# Patient Record
Sex: FEMALE | Race: WHITE | NOT HISPANIC OR LATINO | Employment: OTHER | URBAN - METROPOLITAN AREA
[De-identification: names, ages, dates, MRNs, and addresses within clinical notes are randomized per-mention and may not be internally consistent; named-entity substitution may affect disease eponyms.]

---

## 2018-02-28 ENCOUNTER — APPOINTMENT (OUTPATIENT)
Dept: WOMENS IMAGING | Facility: HOSPITAL | Age: 73
End: 2018-02-28

## 2018-02-28 PROCEDURE — 77067 SCR MAMMO BI INCL CAD: CPT | Performed by: RADIOLOGY

## 2018-03-29 ENCOUNTER — LAB REQUISITION (OUTPATIENT)
Dept: LAB | Facility: HOSPITAL | Age: 73
End: 2018-03-29

## 2018-03-29 DIAGNOSIS — M72.0 PALMAR FASCIAL FIBROMATOSIS: ICD-10-CM

## 2018-03-29 PROCEDURE — 88304 TISSUE EXAM BY PATHOLOGIST: CPT | Performed by: PLASTIC SURGERY

## 2018-03-30 LAB
CYTO UR: NORMAL
LAB AP CASE REPORT: NORMAL
LAB AP CLINICAL INFORMATION: NORMAL
Lab: NORMAL
PATH REPORT.FINAL DX SPEC: NORMAL
PATH REPORT.GROSS SPEC: NORMAL

## 2021-01-08 ENCOUNTER — OFFICE VISIT (OUTPATIENT)
Dept: NEUROSURGERY | Facility: CLINIC | Age: 76
End: 2021-01-08

## 2021-01-08 VITALS
WEIGHT: 114 LBS | HEIGHT: 60 IN | SYSTOLIC BLOOD PRESSURE: 128 MMHG | BODY MASS INDEX: 22.38 KG/M2 | DIASTOLIC BLOOD PRESSURE: 70 MMHG | TEMPERATURE: 97.3 F

## 2021-01-08 DIAGNOSIS — M48.54XA PATHOLOGICAL COMPRESSION FRACTURE OF THORACIC VERTEBRA, INITIAL ENCOUNTER (HCC): ICD-10-CM

## 2021-01-08 DIAGNOSIS — M51.36 DDD (DEGENERATIVE DISC DISEASE), LUMBAR: ICD-10-CM

## 2021-01-08 DIAGNOSIS — M54.9 MECHANICAL BACK PAIN: Primary | ICD-10-CM

## 2021-01-08 PROCEDURE — 99204 OFFICE O/P NEW MOD 45 MIN: CPT | Performed by: NEUROLOGICAL SURGERY

## 2021-01-08 RX ORDER — DILTIAZEM HYDROCHLORIDE 240 MG/1
240 CAPSULE, EXTENDED RELEASE ORAL DAILY
COMMUNITY
Start: 2021-01-06 | End: 2022-07-01 | Stop reason: SDUPTHER

## 2021-01-08 RX ORDER — BUSPIRONE HYDROCHLORIDE 10 MG/1
10 TABLET ORAL 2 TIMES DAILY
COMMUNITY
Start: 2020-11-17 | End: 2022-07-01 | Stop reason: SDUPTHER

## 2021-01-08 RX ORDER — GABAPENTIN 400 MG/1
400 CAPSULE ORAL 3 TIMES DAILY
COMMUNITY
Start: 2021-01-05 | End: 2021-10-12 | Stop reason: HOSPADM

## 2021-01-08 RX ORDER — PAROXETINE HYDROCHLORIDE 40 MG/1
40 TABLET, FILM COATED ORAL EVERY MORNING
COMMUNITY
Start: 2020-10-12 | End: 2022-04-15 | Stop reason: SDUPTHER

## 2021-01-08 RX ORDER — HYDROCODONE BITARTRATE AND ACETAMINOPHEN 10; 325 MG/1; MG/1
TABLET ORAL
COMMUNITY
Start: 2021-01-05

## 2021-01-08 RX ORDER — LOSARTAN POTASSIUM 50 MG/1
50 TABLET ORAL DAILY
COMMUNITY
Start: 2021-01-06 | End: 2022-07-01 | Stop reason: SDUPTHER

## 2021-01-08 RX ORDER — FUROSEMIDE 20 MG/1
TABLET ORAL
Status: ON HOLD | COMMUNITY
Start: 2020-11-16 | End: 2021-10-07

## 2021-01-08 RX ORDER — OMEPRAZOLE 40 MG/1
40 CAPSULE, DELAYED RELEASE ORAL DAILY
COMMUNITY
Start: 2020-11-17 | End: 2022-08-16 | Stop reason: SDUPTHER

## 2021-01-08 RX ORDER — MECLIZINE HYDROCHLORIDE 25 MG/1
25 TABLET ORAL 3 TIMES DAILY PRN
COMMUNITY
Start: 2020-11-17 | End: 2022-07-01 | Stop reason: SDUPTHER

## 2021-01-08 RX ORDER — PRAVASTATIN SODIUM 80 MG/1
80 TABLET ORAL NIGHTLY
COMMUNITY
Start: 2021-01-06 | End: 2022-07-01 | Stop reason: SDUPTHER

## 2021-01-08 NOTE — PROGRESS NOTES
NAME: SOY ACEVES   DOS: 2021  : 1945  PCP: Omer Diaz MD    Chief Complaint:    Chief Complaint   Patient presents with   • Back Pain       History of Present Illness:  75 y.o. female   I saw this 75-year-old female in neurosurgical consultation she has a history of chronic arthritic issues and osteoporosis I reported the summer that she had pain in her back she is had flank pain ever since it radiates into her right buttock hip and leg she has a history of emphysema and continues to smoke she is here for evaluation    PMHX  Allergies:  No Known Allergies  Medications    Current Outpatient Medications:   •  busPIRone (BUSPAR) 10 MG tablet, , Disp: , Rfl:   •  dilTIAZem (TIAZAC) 240 MG 24 hr capsule, , Disp: , Rfl:   •  furosemide (LASIX) 20 MG tablet, TK 1 T PO QD FOR 3 DAYS, Disp: , Rfl:   •  gabapentin (NEURONTIN) 400 MG capsule, Take 400 mg by mouth 3 (Three) Times a Day., Disp: , Rfl:   •  HYDROcodone-acetaminophen (NORCO)  MG per tablet, TAKE 1 TABLET BY MOUTH EVERY 6 HOURS. DO NOT FILL ANY EARLIER THAN 30 DAYS, Disp: , Rfl:   •  losartan (COZAAR) 100 MG tablet, , Disp: , Rfl:   •  meclizine (ANTIVERT) 25 MG tablet, , Disp: , Rfl:   •  omeprazole (priLOSEC) 40 MG capsule, , Disp: , Rfl:   •  PARoxetine (PAXIL) 40 MG tablet, , Disp: , Rfl:   •  pravastatin (PRAVACHOL) 80 MG tablet, , Disp: , Rfl:   Past Medical History:  Past Medical History:   Diagnosis Date   • Arthritis    • Bronchitis    • Emphysema (subcutaneous) (surgical) resulting from a procedure    • Low back pain    • Osteoporosis    • Pneumonia      Past Surgical History:  Past Surgical History:   Procedure Laterality Date   • BREAST BIOPSY Right     [place], Dr. Severo Kay   • HAND SURGERY Left     Arthritis surgery, [place], [place]   • SHOULDER SURGERY Bilateral     [place]. Dr. Dixon     Social Hx:  Social History     Tobacco Use   • Smoking status: Current Every Day Smoker     Packs/day: 0.50     Years: 20.00      Pack years: 10.00   • Smokeless tobacco: Never Used   Substance Use Topics   • Alcohol use: Yes     Comment: 1 Pint per week   • Drug use: Never     Family Hx:  Family History   Problem Relation Age of Onset   • Cancer Father    • Diabetes Sister      Review of Systems:        Review of Systems   Constitutional: Negative for activity change, appetite change, chills, diaphoresis, fatigue, fever and unexpected weight change.   HENT: Negative for congestion, dental problem, drooling, ear discharge, ear pain, facial swelling, hearing loss, mouth sores, nosebleeds, postnasal drip, rhinorrhea, sinus pressure, sinus pain, sneezing, sore throat, tinnitus, trouble swallowing and voice change.    Eyes: Positive for visual disturbance. Negative for photophobia, pain, discharge, redness and itching.   Respiratory: Positive for cough. Negative for apnea, choking, chest tightness, shortness of breath, wheezing and stridor.    Cardiovascular: Positive for palpitations. Negative for chest pain and leg swelling.   Gastrointestinal: Negative for abdominal distention, abdominal pain, anal bleeding, blood in stool, constipation, diarrhea, nausea, rectal pain and vomiting.   Endocrine: Negative for cold intolerance, heat intolerance, polydipsia, polyphagia and polyuria.   Genitourinary: Positive for decreased urine volume. Negative for difficulty urinating, dyspareunia, dysuria, enuresis, flank pain, frequency, genital sores, hematuria, menstrual problem, pelvic pain, urgency, vaginal bleeding, vaginal discharge and vaginal pain.   Musculoskeletal: Positive for back pain, neck pain and neck stiffness. Negative for arthralgias, gait problem, joint swelling and myalgias.   Skin: Negative for color change, pallor, rash and wound.   Allergic/Immunologic: Negative for environmental allergies, food allergies and immunocompromised state.   Neurological: Negative for dizziness, tremors, seizures, syncope, facial asymmetry, speech difficulty,  weakness, light-headedness, numbness and headaches.   Hematological: Negative for adenopathy. Does not bruise/bleed easily.   Psychiatric/Behavioral: Positive for decreased concentration. Negative for agitation, behavioral problems, confusion, dysphoric mood, hallucinations, self-injury, sleep disturbance and suicidal ideas. The patient is not nervous/anxious and is not hyperactive.         I have reviewed this note template and all pertinent parts of the review of systems social, family history, surgical history and medication list negative for red flags of weakness etc.    Physical Examination:  Vitals:    01/08/21 1053   BP: 128/70   Temp: 97.3 °F (36.3 °C)      General Appearance:   Well developed, well nourished, well groomed, alert, and cooperative.  Neurological examination:  Neurologic Exam  She is awake alert and follows commands I left her in a Covid mask for precaution    She has good strength and stigmata arthritis upper extremities she has no Seals's    Back is without lesions no percussive tenderness    Low back paraspinal tenderness present no signs intrinsic hip dysfunction    Out of 5 strength lower extremities with no clonus or long track findings trace reflexes present ambulate Tory without assist    Review of Imaging/DATA:  I reviewed MRI it shows what to me appears to be a chronic T8 compression fracture there is no STIR signal sequence done  Diagnoses/Plan:    Ms. Colon is a 75 y.o. female   There is a 75-year-old with a history of a suspected chronic fracture at T8 her pain is much lower this and additionally this should not be producing sciatic leg pain I recommended repeat MRI of the thoracic and lumbar spine given the time as well as no STIR signal was performed to see if this is an acute fracture I would also like a lumbar spinal MRI with hip x-rays and lumbar flexion-extension film to ascertain whether or not she has a ruptured upper disc.  If she has an acute fracture we can consider  kyphoplasty and/or other therapy otherwise she can undergo nonsurgical management we will see her back with the studies

## 2021-01-15 ENCOUNTER — HOSPITAL ENCOUNTER (OUTPATIENT)
Dept: GENERAL RADIOLOGY | Facility: HOSPITAL | Age: 76
Discharge: HOME | End: 2021-01-15

## 2021-01-15 ENCOUNTER — APPOINTMENT (OUTPATIENT)
Dept: MRI IMAGING | Facility: HOSPITAL | Age: 76
End: 2021-01-15

## 2021-01-15 ENCOUNTER — HOSPITAL ENCOUNTER (OUTPATIENT)
Dept: MRI IMAGING | Facility: HOSPITAL | Age: 76
Discharge: HOME | End: 2021-01-15

## 2021-01-15 ENCOUNTER — OFFICE VISIT (OUTPATIENT)
Dept: NEUROSURGERY | Facility: CLINIC | Age: 76
End: 2021-01-15

## 2021-01-15 VITALS
WEIGHT: 113.8 LBS | BODY MASS INDEX: 22.34 KG/M2 | SYSTOLIC BLOOD PRESSURE: 148 MMHG | HEIGHT: 60 IN | TEMPERATURE: 98 F | DIASTOLIC BLOOD PRESSURE: 72 MMHG

## 2021-01-15 DIAGNOSIS — M51.36 DDD (DEGENERATIVE DISC DISEASE), LUMBAR: ICD-10-CM

## 2021-01-15 DIAGNOSIS — M54.9 MECHANICAL BACK PAIN: ICD-10-CM

## 2021-01-15 DIAGNOSIS — M48.54XA PATHOLOGICAL COMPRESSION FRACTURE OF THORACIC VERTEBRA, INITIAL ENCOUNTER (HCC): ICD-10-CM

## 2021-01-15 DIAGNOSIS — M54.9 MECHANICAL BACK PAIN: Primary | ICD-10-CM

## 2021-01-15 PROCEDURE — 99214 OFFICE O/P EST MOD 30 MIN: CPT | Performed by: NEUROLOGICAL SURGERY

## 2021-01-15 PROCEDURE — 72120 X-RAY BEND ONLY L-S SPINE: CPT

## 2021-01-15 PROCEDURE — 72146 MRI CHEST SPINE W/O DYE: CPT

## 2021-01-15 PROCEDURE — 72148 MRI LUMBAR SPINE W/O DYE: CPT

## 2021-01-15 PROCEDURE — 73521 X-RAY EXAM HIPS BI 2 VIEWS: CPT

## 2021-01-15 NOTE — PROGRESS NOTES
NAME: SOY ACEVES   DOS: 1/15/2021  : 1945  PCP: Omer Diaz MD    Chief Complaint:    Chief Complaint   Patient presents with   • Follow-up     MRIs and Xrays   • Back Pain       History of Present Illness:  75 y.o. female   Is a 75-year-old female in neurosurgical consultation in follow-up she is a lady with a suspected T8 compression fracture presented with some paraspinal pain after a fall she has a history of osteoporosis emphysema and is smoking she has a history of chronic pain use she denies any new weakness or progression she is here for evaluation  PMHX  Allergies:  No Known Allergies  Medications    Current Outpatient Medications:   •  busPIRone (BUSPAR) 10 MG tablet, , Disp: , Rfl:   •  dilTIAZem (TIAZAC) 240 MG 24 hr capsule, , Disp: , Rfl:   •  furosemide (LASIX) 20 MG tablet, TK 1 T PO QD FOR 3 DAYS, Disp: , Rfl:   •  gabapentin (NEURONTIN) 400 MG capsule, Take 400 mg by mouth 3 (Three) Times a Day., Disp: , Rfl:   •  HYDROcodone-acetaminophen (NORCO)  MG per tablet, TAKE 1 TABLET BY MOUTH EVERY 6 HOURS. DO NOT FILL ANY EARLIER THAN 30 DAYS, Disp: , Rfl:   •  losartan (COZAAR) 100 MG tablet, , Disp: , Rfl:   •  meclizine (ANTIVERT) 25 MG tablet, , Disp: , Rfl:   •  omeprazole (priLOSEC) 40 MG capsule, , Disp: , Rfl:   •  PARoxetine (PAXIL) 40 MG tablet, , Disp: , Rfl:   •  pravastatin (PRAVACHOL) 80 MG tablet, , Disp: , Rfl:   Past Medical History:  Past Medical History:   Diagnosis Date   • Arthritis    • Bronchitis    • Emphysema (subcutaneous) (surgical) resulting from a procedure    • Low back pain    • Osteoporosis    • Pneumonia      Past Surgical History:  Past Surgical History:   Procedure Laterality Date   • BREAST BIOPSY Right     [place], Dr. Severo Kay   • HAND SURGERY Left     Arthritis surgery, [place], [place]   • SHOULDER SURGERY Bilateral     [place]. Dr. Dixon     Social Hx:  Social History     Tobacco Use   • Smoking status: Current Every Day Smoker      Packs/day: 0.50     Years: 20.00     Pack years: 10.00   • Smokeless tobacco: Never Used   Substance Use Topics   • Alcohol use: Yes     Comment: 1 Pint per week   • Drug use: Never     Family Hx:  Family History   Problem Relation Age of Onset   • Cancer Father    • Diabetes Sister      Review of Systems:        Review of Systems   Constitutional: Negative for activity change, appetite change, chills, diaphoresis, fatigue, fever and unexpected weight change.   HENT: Negative for congestion, dental problem, drooling, ear discharge, ear pain, facial swelling, hearing loss, mouth sores, nosebleeds, postnasal drip, rhinorrhea, sinus pressure, sinus pain, sneezing, sore throat, tinnitus, trouble swallowing and voice change.    Eyes: Positive for visual disturbance. Negative for photophobia, pain, discharge, redness and itching.   Respiratory: Positive for cough. Negative for apnea, choking, chest tightness, shortness of breath, wheezing and stridor.    Cardiovascular: Positive for palpitations. Negative for chest pain and leg swelling.   Gastrointestinal: Negative for abdominal distention, abdominal pain, anal bleeding, blood in stool, constipation, diarrhea, nausea, rectal pain and vomiting.   Endocrine: Negative for cold intolerance, heat intolerance, polydipsia, polyphagia and polyuria.   Genitourinary: Positive for decreased urine volume. Negative for difficulty urinating, dyspareunia, dysuria, enuresis, flank pain, frequency, genital sores, hematuria, menstrual problem, pelvic pain, urgency, vaginal bleeding, vaginal discharge and vaginal pain.   Musculoskeletal: Positive for back pain, neck pain and neck stiffness. Negative for arthralgias, gait problem, joint swelling and myalgias.   Skin: Negative for color change, pallor, rash and wound.   Allergic/Immunologic: Negative for environmental allergies, food allergies and immunocompromised state.   Neurological: Negative for dizziness, tremors, seizures, syncope,  facial asymmetry, speech difficulty, weakness, light-headedness, numbness and headaches.   Hematological: Negative for adenopathy. Does not bruise/bleed easily.   Psychiatric/Behavioral: Positive for decreased concentration. Negative for agitation, behavioral problems, confusion, dysphoric mood, hallucinations, self-injury, sleep disturbance and suicidal ideas. The patient is not nervous/anxious and is not hyperactive.       I have reviewed this note template and all pertinent parts of the review of systems social, family history, surgical history and medication list negative for red flags of back pain      Physical Examination:  Vitals:    01/15/21 1114   BP: 148/72   Temp: 98 °F (36.7 °C)      General Appearance:   Appears at baseline from last visit neurological examination:  Neurologic Exam  She has no percussive tenderness today    She reports no pain with range of motion exercises in the office    She is a frail build stigmata of emphysema    She has no clonus long track signs myelopathy and her strength is good    Review of Imaging/DATA:  I reviewed her MRI personally the thoracic lumbar spine there is no evidence of STIR signal change she has degenerative changes in the lumbar spine but no evidence of compressive etiology the suspected spinal fracture is well-healed without signal change    Diagnoses/Plan:    Ms. Colon is a 75 y.o. female   This is a very pleasant 75-year-old woman that has a history of chronic arthritic issues osteoporosis and emphysema she is not a surgical candidate from general medical standpoint except as a last option she has some healed fractures in her thoracic spine and most of her muscular strength is para spinal in nature I do not see anything that I can offer surgery for her that I can fix with an interventional option the biggest issue for her will be limitation of her activities as many times when sitting in the office in examination room she is comfortable but wishes to be  able to do more    I will return her care back over to her chronic interventional pain management  and it has been a pleasure to provide neurosurgical care

## 2021-10-07 ENCOUNTER — HOSPITAL ENCOUNTER (INPATIENT)
Facility: HOSPITAL | Age: 76
LOS: 5 days | Discharge: HOME | End: 2021-10-12
Attending: EMERGENCY MEDICINE | Admitting: FAMILY MEDICINE

## 2021-10-07 ENCOUNTER — APPOINTMENT (OUTPATIENT)
Dept: GENERAL RADIOLOGY | Facility: HOSPITAL | Age: 76
End: 2021-10-07

## 2021-10-07 DIAGNOSIS — R13.12 OROPHARYNGEAL DYSPHAGIA: ICD-10-CM

## 2021-10-07 DIAGNOSIS — G62.9 NEUROPATHY: ICD-10-CM

## 2021-10-07 DIAGNOSIS — R09.02 HYPOXIA: ICD-10-CM

## 2021-10-07 DIAGNOSIS — F41.9 ANXIETY DISORDER, UNSPECIFIED TYPE: ICD-10-CM

## 2021-10-07 DIAGNOSIS — J44.1 COPD EXACERBATION (HCC): Primary | ICD-10-CM

## 2021-10-07 PROBLEM — E78.5 HLD (HYPERLIPIDEMIA): Status: ACTIVE | Noted: 2021-10-07

## 2021-10-07 PROBLEM — I10 HTN (HYPERTENSION): Status: ACTIVE | Noted: 2021-10-07

## 2021-10-07 PROBLEM — J44.9 COPD WITH HYPOXIA: Status: ACTIVE | Noted: 2021-10-07

## 2021-10-07 PROBLEM — F32.A DEPRESSION: Status: ACTIVE | Noted: 2021-10-07

## 2021-10-07 LAB
ALBUMIN SERPL-MCNC: 4.6 G/DL (ref 3.5–5.2)
ALBUMIN/GLOB SERPL: 1.6 G/DL
ALP SERPL-CCNC: 61 U/L (ref 39–117)
ALT SERPL W P-5'-P-CCNC: 19 U/L (ref 1–33)
ANION GAP SERPL CALCULATED.3IONS-SCNC: 13 MMOL/L (ref 5–15)
ARTERIAL PATENCY WRIST A: ABNORMAL
AST SERPL-CCNC: 36 U/L (ref 1–32)
ATMOSPHERIC PRESS: ABNORMAL MM[HG]
BASE EXCESS BLDA CALC-SCNC: 1.2 MMOL/L (ref 0–2)
BASOPHILS # BLD AUTO: 0.01 10*3/MM3 (ref 0–0.2)
BASOPHILS NFR BLD AUTO: 0.1 % (ref 0–1.5)
BDY SITE: ABNORMAL
BILIRUB SERPL-MCNC: 0.3 MG/DL (ref 0–1.2)
BODY TEMPERATURE: 37 C
BUN SERPL-MCNC: 23 MG/DL (ref 8–23)
BUN/CREAT SERPL: 24.5 (ref 7–25)
CALCIUM SPEC-SCNC: 9.4 MG/DL (ref 8.6–10.5)
CHLORIDE SERPL-SCNC: 105 MMOL/L (ref 98–107)
CO2 BLDA-SCNC: 26.5 MMOL/L (ref 22–33)
CO2 SERPL-SCNC: 26 MMOL/L (ref 22–29)
COHGB MFR BLD: 0.3 % (ref 0–2)
CREAT SERPL-MCNC: 0.94 MG/DL (ref 0.57–1)
DEPRECATED RDW RBC AUTO: 51 FL (ref 37–54)
EOSINOPHIL # BLD AUTO: 0.01 10*3/MM3 (ref 0–0.4)
EOSINOPHIL NFR BLD AUTO: 0.1 % (ref 0.3–6.2)
EPAP: 0
ERYTHROCYTE [DISTWIDTH] IN BLOOD BY AUTOMATED COUNT: 15.7 % (ref 12.3–15.4)
FLUAV SUBTYP SPEC NAA+PROBE: NOT DETECTED
FLUBV RNA ISLT QL NAA+PROBE: NOT DETECTED
GFR SERPL CREATININE-BSD FRML MDRD: 58 ML/MIN/1.73
GLOBULIN UR ELPH-MCNC: 2.8 GM/DL
GLUCOSE BLDC GLUCOMTR-MCNC: 144 MG/DL (ref 70–130)
GLUCOSE SERPL-MCNC: 131 MG/DL (ref 65–99)
HCO3 BLDA-SCNC: 25.3 MMOL/L (ref 20–26)
HCT VFR BLD AUTO: 35.8 % (ref 34–46.6)
HCT VFR BLD CALC: 33.8 %
HGB BLD-MCNC: 11.5 G/DL (ref 12–15.9)
HGB BLDA-MCNC: 11 G/DL (ref 14–18)
HOLD SPECIMEN: NORMAL
IMM GRANULOCYTES # BLD AUTO: 0.1 10*3/MM3 (ref 0–0.05)
IMM GRANULOCYTES NFR BLD AUTO: 0.7 % (ref 0–0.5)
INHALED O2 CONCENTRATION: 100 %
IPAP: 0
LYMPHOCYTES # BLD AUTO: 0.93 10*3/MM3 (ref 0.7–3.1)
LYMPHOCYTES NFR BLD AUTO: 6.7 % (ref 19.6–45.3)
MAGNESIUM SERPL-MCNC: 2.2 MG/DL (ref 1.6–2.4)
MCH RBC QN AUTO: 28.5 PG (ref 26.6–33)
MCHC RBC AUTO-ENTMCNC: 32.1 G/DL (ref 31.5–35.7)
MCV RBC AUTO: 88.8 FL (ref 79–97)
METHGB BLD QL: 0.5 % (ref 0–1.5)
MODALITY: ABNORMAL
MONOCYTES # BLD AUTO: 0.57 10*3/MM3 (ref 0.1–0.9)
MONOCYTES NFR BLD AUTO: 4.1 % (ref 5–12)
NEUTROPHILS NFR BLD AUTO: 12.32 10*3/MM3 (ref 1.7–7)
NEUTROPHILS NFR BLD AUTO: 88.3 % (ref 42.7–76)
NOTE: ABNORMAL
NRBC BLD AUTO-RTO: 0 /100 WBC (ref 0–0.2)
NT-PROBNP SERPL-MCNC: 4884 PG/ML (ref 0–1800)
OXYHGB MFR BLDV: 98.6 % (ref 94–99)
PAW @ PEAK INSP FLOW SETTING VENT: 0 CMH2O
PCO2 BLDA: 37.5 MM HG (ref 35–45)
PCO2 TEMP ADJ BLD: 37.5 MM HG (ref 35–45)
PH BLDA: 7.44 PH UNITS (ref 7.35–7.45)
PH, TEMP CORRECTED: 7.44 PH UNITS
PLATELET # BLD AUTO: 344 10*3/MM3 (ref 140–450)
PMV BLD AUTO: 10.2 FL (ref 6–12)
PO2 BLDA: 150 MM HG (ref 83–108)
PO2 TEMP ADJ BLD: 150 MM HG (ref 83–108)
POTASSIUM SERPL-SCNC: 3.9 MMOL/L (ref 3.5–5.2)
PROCALCITONIN SERPL-MCNC: 0.05 NG/ML (ref 0–0.25)
PROT SERPL-MCNC: 7.4 G/DL (ref 6–8.5)
QT INTERVAL: 372 MS
QTC INTERVAL: 457 MS
RBC # BLD AUTO: 4.03 10*6/MM3 (ref 3.77–5.28)
SARS-COV-2 RNA PNL SPEC NAA+PROBE: NOT DETECTED
SODIUM SERPL-SCNC: 144 MMOL/L (ref 136–145)
TOTAL RATE: 0 BREATHS/MINUTE
TROPONIN T SERPL-MCNC: 0.01 NG/ML (ref 0–0.03)
WBC # BLD AUTO: 13.94 10*3/MM3 (ref 3.4–10.8)
WHOLE BLOOD HOLD SPECIMEN: NORMAL
WHOLE BLOOD HOLD SPECIMEN: NORMAL

## 2021-10-07 PROCEDURE — 94640 AIRWAY INHALATION TREATMENT: CPT

## 2021-10-07 PROCEDURE — 36600 WITHDRAWAL OF ARTERIAL BLOOD: CPT

## 2021-10-07 PROCEDURE — 25010000002 HEPARIN (PORCINE) PER 1000 UNITS: Performed by: NURSE PRACTITIONER

## 2021-10-07 PROCEDURE — 94799 UNLISTED PULMONARY SVC/PX: CPT

## 2021-10-07 PROCEDURE — 83880 ASSAY OF NATRIURETIC PEPTIDE: CPT | Performed by: EMERGENCY MEDICINE

## 2021-10-07 PROCEDURE — 87636 SARSCOV2 & INF A&B AMP PRB: CPT | Performed by: EMERGENCY MEDICINE

## 2021-10-07 PROCEDURE — 25010000002 METHYLPREDNISOLONE PER 125 MG: Performed by: NURSE PRACTITIONER

## 2021-10-07 PROCEDURE — 71045 X-RAY EXAM CHEST 1 VIEW: CPT

## 2021-10-07 PROCEDURE — 94660 CPAP INITIATION&MGMT: CPT

## 2021-10-07 PROCEDURE — 82962 GLUCOSE BLOOD TEST: CPT

## 2021-10-07 PROCEDURE — 82805 BLOOD GASES W/O2 SATURATION: CPT

## 2021-10-07 PROCEDURE — 84484 ASSAY OF TROPONIN QUANT: CPT | Performed by: EMERGENCY MEDICINE

## 2021-10-07 PROCEDURE — 93010 ELECTROCARDIOGRAM REPORT: CPT | Performed by: INTERNAL MEDICINE

## 2021-10-07 PROCEDURE — 25010000002 FUROSEMIDE PER 20 MG: Performed by: INTERNAL MEDICINE

## 2021-10-07 PROCEDURE — 80053 COMPREHEN METABOLIC PANEL: CPT | Performed by: EMERGENCY MEDICINE

## 2021-10-07 PROCEDURE — 84145 PROCALCITONIN (PCT): CPT | Performed by: NURSE PRACTITIONER

## 2021-10-07 PROCEDURE — 83050 HGB METHEMOGLOBIN QUAN: CPT

## 2021-10-07 PROCEDURE — 85025 COMPLETE CBC W/AUTO DIFF WBC: CPT | Performed by: EMERGENCY MEDICINE

## 2021-10-07 PROCEDURE — 93005 ELECTROCARDIOGRAM TRACING: CPT | Performed by: EMERGENCY MEDICINE

## 2021-10-07 PROCEDURE — 25010000002 METHYLPREDNISOLONE PER 125 MG: Performed by: EMERGENCY MEDICINE

## 2021-10-07 PROCEDURE — 93005 ELECTROCARDIOGRAM TRACING: CPT | Performed by: INTERNAL MEDICINE

## 2021-10-07 PROCEDURE — 25010000002 MAGNESIUM SULFATE IN D5W 1G/100ML (PREMIX) 1-5 GM/100ML-% SOLUTION: Performed by: INTERNAL MEDICINE

## 2021-10-07 PROCEDURE — 83735 ASSAY OF MAGNESIUM: CPT | Performed by: INTERNAL MEDICINE

## 2021-10-07 PROCEDURE — 82375 ASSAY CARBOXYHB QUANT: CPT

## 2021-10-07 PROCEDURE — 99223 1ST HOSP IP/OBS HIGH 75: CPT | Performed by: INTERNAL MEDICINE

## 2021-10-07 PROCEDURE — 99284 EMERGENCY DEPT VISIT MOD MDM: CPT

## 2021-10-07 RX ORDER — ZOLPIDEM TARTRATE 5 MG/1
5 TABLET ORAL NIGHTLY
Status: DISCONTINUED | OUTPATIENT
Start: 2021-10-07 | End: 2021-10-12 | Stop reason: HOSPADM

## 2021-10-07 RX ORDER — ACETAMINOPHEN 160 MG/5ML
650 SOLUTION ORAL EVERY 4 HOURS PRN
Status: DISCONTINUED | OUTPATIENT
Start: 2021-10-07 | End: 2021-10-12 | Stop reason: HOSPADM

## 2021-10-07 RX ORDER — POLYETHYLENE GLYCOL 3350 17 G/17G
17 POWDER, FOR SOLUTION ORAL DAILY PRN
Status: DISCONTINUED | OUTPATIENT
Start: 2021-10-07 | End: 2021-10-12 | Stop reason: HOSPADM

## 2021-10-07 RX ORDER — ACETAMINOPHEN 325 MG/1
650 TABLET ORAL EVERY 4 HOURS PRN
Status: DISCONTINUED | OUTPATIENT
Start: 2021-10-07 | End: 2021-10-12 | Stop reason: HOSPADM

## 2021-10-07 RX ORDER — IPRATROPIUM BROMIDE AND ALBUTEROL SULFATE 2.5; .5 MG/3ML; MG/3ML
3 SOLUTION RESPIRATORY (INHALATION) EVERY 4 HOURS PRN
Status: ON HOLD | COMMUNITY
End: 2021-10-12 | Stop reason: SDUPTHER

## 2021-10-07 RX ORDER — HYDROCODONE BITARTRATE AND ACETAMINOPHEN 10; 325 MG/1; MG/1
1 TABLET ORAL EVERY 6 HOURS PRN
Status: DISCONTINUED | OUTPATIENT
Start: 2021-10-07 | End: 2021-10-12 | Stop reason: HOSPADM

## 2021-10-07 RX ORDER — LEVOFLOXACIN 250 MG/1
250 TABLET ORAL DAILY
COMMUNITY
End: 2021-10-12 | Stop reason: HOSPADM

## 2021-10-07 RX ORDER — GUAIFENESIN 600 MG/1
1200 TABLET, EXTENDED RELEASE ORAL EVERY 12 HOURS SCHEDULED
Status: DISCONTINUED | OUTPATIENT
Start: 2021-10-07 | End: 2021-10-12 | Stop reason: HOSPADM

## 2021-10-07 RX ORDER — GABAPENTIN 300 MG/1
300 CAPSULE ORAL EVERY 8 HOURS SCHEDULED
Status: DISCONTINUED | OUTPATIENT
Start: 2021-10-07 | End: 2021-10-12 | Stop reason: HOSPADM

## 2021-10-07 RX ORDER — DILTIAZEM HYDROCHLORIDE 240 MG/1
240 CAPSULE, COATED, EXTENDED RELEASE ORAL
Status: DISCONTINUED | OUTPATIENT
Start: 2021-10-08 | End: 2021-10-12 | Stop reason: HOSPADM

## 2021-10-07 RX ORDER — PRAVASTATIN SODIUM 40 MG
80 TABLET ORAL NIGHTLY
Status: DISCONTINUED | OUTPATIENT
Start: 2021-10-07 | End: 2021-10-12 | Stop reason: HOSPADM

## 2021-10-07 RX ORDER — BUSPIRONE HYDROCHLORIDE 10 MG/1
10 TABLET ORAL DAILY
Status: DISCONTINUED | OUTPATIENT
Start: 2021-10-07 | End: 2021-10-12 | Stop reason: HOSPADM

## 2021-10-07 RX ORDER — BISACODYL 10 MG
10 SUPPOSITORY, RECTAL RECTAL DAILY PRN
Status: DISCONTINUED | OUTPATIENT
Start: 2021-10-07 | End: 2021-10-12 | Stop reason: HOSPADM

## 2021-10-07 RX ORDER — ONDANSETRON 4 MG/1
4 TABLET, FILM COATED ORAL EVERY 6 HOURS PRN
Status: DISCONTINUED | OUTPATIENT
Start: 2021-10-07 | End: 2021-10-12 | Stop reason: HOSPADM

## 2021-10-07 RX ORDER — METHYLPREDNISOLONE SODIUM SUCCINATE 125 MG/2ML
60 INJECTION, POWDER, LYOPHILIZED, FOR SOLUTION INTRAMUSCULAR; INTRAVENOUS EVERY 8 HOURS
Status: DISCONTINUED | OUTPATIENT
Start: 2021-10-07 | End: 2021-10-09

## 2021-10-07 RX ORDER — ONDANSETRON 2 MG/ML
4 INJECTION INTRAMUSCULAR; INTRAVENOUS EVERY 6 HOURS PRN
Status: DISCONTINUED | OUTPATIENT
Start: 2021-10-07 | End: 2021-10-12 | Stop reason: HOSPADM

## 2021-10-07 RX ORDER — ACETAMINOPHEN 650 MG/1
650 SUPPOSITORY RECTAL EVERY 4 HOURS PRN
Status: DISCONTINUED | OUTPATIENT
Start: 2021-10-07 | End: 2021-10-12 | Stop reason: HOSPADM

## 2021-10-07 RX ORDER — HEPARIN SODIUM 5000 [USP'U]/ML
5000 INJECTION, SOLUTION INTRAVENOUS; SUBCUTANEOUS EVERY 12 HOURS SCHEDULED
Status: DISCONTINUED | OUTPATIENT
Start: 2021-10-07 | End: 2021-10-12 | Stop reason: HOSPADM

## 2021-10-07 RX ORDER — SODIUM CHLORIDE 0.9 % (FLUSH) 0.9 %
10 SYRINGE (ML) INJECTION EVERY 12 HOURS SCHEDULED
Status: DISCONTINUED | OUTPATIENT
Start: 2021-10-07 | End: 2021-10-12 | Stop reason: HOSPADM

## 2021-10-07 RX ORDER — SODIUM CHLORIDE 0.9 % (FLUSH) 0.9 %
10 SYRINGE (ML) INJECTION AS NEEDED
Status: DISCONTINUED | OUTPATIENT
Start: 2021-10-07 | End: 2021-10-12 | Stop reason: HOSPADM

## 2021-10-07 RX ORDER — BENZONATATE 100 MG/1
200 CAPSULE ORAL 3 TIMES DAILY PRN
Status: DISCONTINUED | OUTPATIENT
Start: 2021-10-07 | End: 2021-10-12 | Stop reason: HOSPADM

## 2021-10-07 RX ORDER — BUDESONIDE AND FORMOTEROL FUMARATE DIHYDRATE 80; 4.5 UG/1; UG/1
2 AEROSOL RESPIRATORY (INHALATION)
Status: DISCONTINUED | OUTPATIENT
Start: 2021-10-07 | End: 2021-10-12 | Stop reason: HOSPADM

## 2021-10-07 RX ORDER — MAGNESIUM SULFATE 1 G/100ML
1 INJECTION INTRAVENOUS ONCE
Status: COMPLETED | OUTPATIENT
Start: 2021-10-07 | End: 2021-10-07

## 2021-10-07 RX ORDER — ALBUTEROL SULFATE 2.5 MG/3ML
2.5 SOLUTION RESPIRATORY (INHALATION) ONCE
Status: COMPLETED | OUTPATIENT
Start: 2021-10-07 | End: 2021-10-07

## 2021-10-07 RX ORDER — ALBUTEROL SULFATE 1.25 MG/3ML
SOLUTION RESPIRATORY (INHALATION)
Status: DISCONTINUED
Start: 2021-10-07 | End: 2021-10-12 | Stop reason: HOSPADM

## 2021-10-07 RX ORDER — METHYLPREDNISOLONE SODIUM SUCCINATE 125 MG/2ML
125 INJECTION, POWDER, LYOPHILIZED, FOR SOLUTION INTRAMUSCULAR; INTRAVENOUS ONCE
Status: COMPLETED | OUTPATIENT
Start: 2021-10-07 | End: 2021-10-07

## 2021-10-07 RX ORDER — ASPIRIN 325 MG
325 TABLET ORAL DAILY
Status: DISCONTINUED | OUTPATIENT
Start: 2021-10-07 | End: 2021-10-12 | Stop reason: HOSPADM

## 2021-10-07 RX ORDER — MECLIZINE HCL 12.5 MG/1
25 TABLET ORAL 2 TIMES DAILY
Status: DISCONTINUED | OUTPATIENT
Start: 2021-10-07 | End: 2021-10-12 | Stop reason: HOSPADM

## 2021-10-07 RX ORDER — BENZONATATE 100 MG/1
100 CAPSULE ORAL 3 TIMES DAILY PRN
COMMUNITY
End: 2021-10-12 | Stop reason: HOSPADM

## 2021-10-07 RX ORDER — IPRATROPIUM BROMIDE AND ALBUTEROL SULFATE 2.5; .5 MG/3ML; MG/3ML
3 SOLUTION RESPIRATORY (INHALATION)
Status: DISCONTINUED | OUTPATIENT
Start: 2021-10-07 | End: 2021-10-07

## 2021-10-07 RX ORDER — PAROXETINE HYDROCHLORIDE 20 MG/1
40 TABLET, FILM COATED ORAL DAILY
Status: DISCONTINUED | OUTPATIENT
Start: 2021-10-07 | End: 2021-10-12 | Stop reason: HOSPADM

## 2021-10-07 RX ORDER — DEXTROMETHORPHAN POLISTIREX 30 MG/5ML
60 SUSPENSION ORAL EVERY 12 HOURS SCHEDULED
Status: DISCONTINUED | OUTPATIENT
Start: 2021-10-07 | End: 2021-10-12 | Stop reason: HOSPADM

## 2021-10-07 RX ORDER — AMOXICILLIN 250 MG
2 CAPSULE ORAL 2 TIMES DAILY PRN
Status: DISCONTINUED | OUTPATIENT
Start: 2021-10-07 | End: 2021-10-12 | Stop reason: HOSPADM

## 2021-10-07 RX ORDER — IPRATROPIUM BROMIDE AND ALBUTEROL SULFATE 2.5; .5 MG/3ML; MG/3ML
3 SOLUTION RESPIRATORY (INHALATION) ONCE
Status: COMPLETED | OUTPATIENT
Start: 2021-10-07 | End: 2021-10-07

## 2021-10-07 RX ORDER — PANTOPRAZOLE SODIUM 40 MG/1
40 TABLET, DELAYED RELEASE ORAL DAILY
Status: DISCONTINUED | OUTPATIENT
Start: 2021-10-07 | End: 2021-10-12 | Stop reason: HOSPADM

## 2021-10-07 RX ORDER — PREDNISONE 10 MG/1
TABLET ORAL
COMMUNITY
End: 2021-10-12 | Stop reason: HOSPADM

## 2021-10-07 RX ORDER — SODIUM CHLORIDE 0.9 % (FLUSH) 0.9 %
10 SYRINGE (ML) INJECTION AS NEEDED
Status: DISCONTINUED | OUTPATIENT
Start: 2021-10-07 | End: 2021-10-08

## 2021-10-07 RX ORDER — BISACODYL 5 MG/1
5 TABLET, DELAYED RELEASE ORAL DAILY PRN
Status: DISCONTINUED | OUTPATIENT
Start: 2021-10-07 | End: 2021-10-12 | Stop reason: HOSPADM

## 2021-10-07 RX ORDER — LOSARTAN POTASSIUM 50 MG/1
50 TABLET ORAL DAILY
Status: DISCONTINUED | OUTPATIENT
Start: 2021-10-08 | End: 2021-10-12 | Stop reason: HOSPADM

## 2021-10-07 RX ORDER — IPRATROPIUM BROMIDE AND ALBUTEROL SULFATE 2.5; .5 MG/3ML; MG/3ML
3 SOLUTION RESPIRATORY (INHALATION)
Status: DISCONTINUED | OUTPATIENT
Start: 2021-10-07 | End: 2021-10-12 | Stop reason: HOSPADM

## 2021-10-07 RX ORDER — FUROSEMIDE 10 MG/ML
20 INJECTION INTRAMUSCULAR; INTRAVENOUS ONCE
Status: COMPLETED | OUTPATIENT
Start: 2021-10-07 | End: 2021-10-07

## 2021-10-07 RX ADMIN — ZOLPIDEM TARTRATE 5 MG: 5 TABLET ORAL at 22:12

## 2021-10-07 RX ADMIN — METHYLPREDNISOLONE SODIUM SUCCINATE 125 MG: 125 INJECTION, POWDER, FOR SOLUTION INTRAMUSCULAR; INTRAVENOUS at 15:19

## 2021-10-07 RX ADMIN — MAGNESIUM SULFATE HEPTAHYDRATE 1 G: 1 INJECTION, SOLUTION INTRAVENOUS at 22:13

## 2021-10-07 RX ADMIN — ALBUTEROL SULFATE 2.5 MG: 2.5 SOLUTION RESPIRATORY (INHALATION) at 22:00

## 2021-10-07 RX ADMIN — BUDESONIDE AND FORMOTEROL FUMARATE DIHYDRATE 2 PUFF: 80; 4.5 AEROSOL RESPIRATORY (INHALATION) at 20:03

## 2021-10-07 RX ADMIN — DOXYCYCLINE 100 MG: 100 INJECTION, POWDER, LYOPHILIZED, FOR SOLUTION INTRAVENOUS at 18:25

## 2021-10-07 RX ADMIN — IPRATROPIUM BROMIDE AND ALBUTEROL SULFATE 3 ML: .5; 2.5 SOLUTION RESPIRATORY (INHALATION) at 15:32

## 2021-10-07 RX ADMIN — HEPARIN SODIUM 5000 UNITS: 5000 INJECTION, SOLUTION INTRAVENOUS; SUBCUTANEOUS at 22:12

## 2021-10-07 RX ADMIN — GUAIFENESIN 1200 MG: 600 TABLET, EXTENDED RELEASE ORAL at 22:11

## 2021-10-07 RX ADMIN — Medication 60 MG: at 22:11

## 2021-10-07 RX ADMIN — FUROSEMIDE 20 MG: 10 INJECTION INTRAMUSCULAR; INTRAVENOUS at 22:12

## 2021-10-07 RX ADMIN — METHYLPREDNISOLONE SODIUM SUCCINATE 60 MG: 125 INJECTION, POWDER, FOR SOLUTION INTRAMUSCULAR; INTRAVENOUS at 21:32

## 2021-10-07 RX ADMIN — PRAVASTATIN SODIUM 80 MG: 40 TABLET ORAL at 22:12

## 2021-10-07 RX ADMIN — MECLIZINE 25 MG: 12.5 TABLET ORAL at 22:12

## 2021-10-07 RX ADMIN — HYDROCODONE POLISTIREX AND CHLORPHENIRAMINE POLISTIREX 5 ML: 10; 8 SUSPENSION, EXTENDED RELEASE ORAL at 16:05

## 2021-10-07 RX ADMIN — IPRATROPIUM BROMIDE AND ALBUTEROL SULFATE 3 ML: 2.5; .5 SOLUTION RESPIRATORY (INHALATION) at 20:03

## 2021-10-07 RX ADMIN — GABAPENTIN 300 MG: 300 CAPSULE ORAL at 22:12

## 2021-10-07 RX ADMIN — SODIUM CHLORIDE, PRESERVATIVE FREE 10 ML: 5 INJECTION INTRAVENOUS at 22:13

## 2021-10-07 RX ADMIN — IPRATROPIUM BROMIDE AND ALBUTEROL SULFATE 3 ML: 2.5; .5 SOLUTION RESPIRATORY (INHALATION) at 23:34

## 2021-10-07 RX ADMIN — IPRATROPIUM BROMIDE AND ALBUTEROL SULFATE 3 ML: 2.5; .5 SOLUTION RESPIRATORY (INHALATION) at 14:05

## 2021-10-07 NOTE — H&P
[facility] Medicine Services  HISTORY AND PHYSICAL    Patient Name: Pilar Colon  : 1945  MRN: [id number]  Primary Care Physician: Omer Diaz MD  Date of admission: 10/7/2021    Subjective   Subjective     Chief Complaint:  Shortness of breath     HPI:  Pilar Colon is a 76 y.o. female with PMH of arthritis, emphysema, HLD, HTN, depression, anxiety, CAD with stent, chronic low back pain. Patient states she started having soa on . She was placed on steroids and abx by per pcp on Monday but was still doing bad so she went to [place] ED and was evaluated and sent home. She was still having increased soa and coughing on Wednesday and went back to ED and per patient her oxygen level was in the 80's and she was evaluated and sent home. Oxygen had been ordered for patient but has not been set up at home yet was suppose to arrive today. She has no portable oxygen. She talked with her pcp today and they recommended for her to come to [facility] to get evaluated. She deneis any fever, chills, N/V/D or cp. She has a non prod cough and she has been using her nebs more freq at home. She quit smoking a week ago. And her last drink was a week ago.,     In Ed: bnp 4884, WBC 13.94, hgb 11.5, K 3.9, trop 0.012      COVID Details:        Symptoms: [] NONE [] Fever [x]  Cough [x] Shortness of breath [] Change in taste or smell  The patient has a COVID HM Topic on their chart, and they are fully vaccinated.    Review of Systems   Constitutional: Negative for chills and fever.   Respiratory: Positive for cough, chest tightness and shortness of breath.    Cardiovascular: Negative for chest pain, palpitations and leg swelling.   Gastrointestinal: Negative for constipation, diarrhea, nausea and vomiting.   Genitourinary: Negative for dysuria.        All other systems reviewed and are negative.     Personal History     Past Medical History:   Diagnosis Date   • Anemia    • Anxiety    • Arthritis     • Bronchitis    • COPD (chronic obstructive pulmonary disease) (HCC)    • Depression    • Diverticulitis    • Emphysema (subcutaneous) (surgical) resulting from a procedure    • History of left heart catheterization     STENT PLACED   • Hyperlipidemia    • Hypertension    • Low back pain    • Osteoporosis    • Pneumonia        Past Surgical History:   Procedure Laterality Date   • BREAST BIOPSY Right     [place], Dr. Severo Kay   • HAND SURGERY Left     Arthritis surgery, [place], [place]   • SHOULDER SURGERY Bilateral     [place]. Dr. Dixon   • TUBAL ABDOMINAL LIGATION         Family History:  family history includes Cancer in her father; Diabetes in her sister. Otherwise pertinent FHx was reviewed and unremarkable.     Social History:  reports that she has quit smoking. She has a 10.00 pack-year smoking history. She has never used smokeless tobacco. She reports current alcohol use. She reports that she does not use drugs.  Social History     Social History Narrative   • Not on file       Medications:  HYDROcodone-acetaminophen, PARoxetine, aspirin, busPIRone, dilTIAZem, furosemide, gabapentin, losartan, meclizine, omeprazole, and pravastatin    Allergies   Allergen Reactions   • Tramadol Swelling       Objective   Objective     Vital Signs:   Temp:  [96.9 °F (36.1 °C)] 96.9 °F (36.1 °C)  Heart Rate:  [] 95  Resp:  [18-28] 26  BP: (112-165)/(72-92) 150/72  Flow (L/min):  [6] 6    Physical Exam   Constitutional: Awake, alert, thin female   Eyes: PERRLA, sclerae anicteric, no conjunctival injection  HENT: NCAT, mucous membranes moist  Neck: Supple, no thyromegaly, no lymphadenopathy, trachea midline  Respiratory: coarse exp wheezing khloe, tight, labored respirations while talking or when coughing, 5L 98%, non prod cough   Cardiovascular: RRR, no murmurs, rubs, or gallops, palpable pedal pulses bilaterally  Gastrointestinal: Positive bowel sounds, soft, nontender, nondistended  Musculoskeletal: No  bilateral ankle edema, no clubbing or cyanosis to extremities  Psychiatric: Appropriate affect, cooperative  Neurologic: Oriented x 3, strength symmetric in all extremities, Cranial Nerves grossly intact to confrontation, speech clear  Skin: No rashes      Result Review:  I have personally reviewed the results from the time of this admission to 10/07/21 3:58 PM EDT and agree with these findings:  [x]  Laboratory  [x]  Microbiology  []  Radiology  []  EKG/Telemetry   []  Cardiology/Vascular   []  Pathology  []  Old records  []  Other:  Most notable findings include:      LAB RESULTS:      Lab 10/07/21  1323   WBC 13.94*   HEMOGLOBIN 11.5*   HEMATOCRIT 35.8   PLATELETS 344   NEUTROS ABS 12.32*   IMMATURE GRANS (ABS) 0.10*   LYMPHS ABS 0.93   MONOS ABS 0.57   EOS ABS 0.01   MCV 88.8   PROCALCITONIN 0.05         Lab 10/07/21  1323   SODIUM 144   POTASSIUM 3.9   CHLORIDE 105   CO2 26.0   ANION GAP 13.0   BUN 23   CREATININE 0.94   GLUCOSE 131*   CALCIUM 9.4         Lab 10/07/21  1323   TOTAL PROTEIN 7.4   ALBUMIN 4.60   GLOBULIN 2.8   ALT (SGPT) 19   AST (SGOT) 36*   BILIRUBIN 0.3   ALK PHOS 61         Lab 10/07/21  1323   PROBNP 4,884.0*   TROPONIN T 0.012                   Microbiology Results (last 10 days)     Procedure Component Value - Date/Time    COVID PRE-OP / PRE-PROCEDURE SCREENING ORDER (NO ISOLATION) - Swab, Nasopharynx [[id number]]  (Normal) Collected: 10/07/21 1518    Lab Status: Final result Specimen: Swab from Nasopharynx Updated: 10/07/21 1552    Narrative:      The following orders were created for panel order COVID PRE-OP / PRE-PROCEDURE SCREENING ORDER (NO ISOLATION) - Swab, Nasopharynx.  Procedure                               Abnormality         Status                     ---------                               -----------         ------                     COVID-19 and FLU A/B PCR...[[id number]]  Normal              Final result                 Please view results for these tests on the individual  orders.    COVID-19 and FLU A/B PCR - Swab, Nasopharynx [[id number]]  (Normal) Collected: 10/07/21 1518    Lab Status: Final result Specimen: Swab from Nasopharynx Updated: 10/07/21 1552     COVID19 Not Detected     Influenza A PCR Not Detected     Influenza B PCR Not Detected    Narrative:      Fact sheet for providers: https://www.fda.gov/media/071283/download    Fact sheet for patients: https://www.fda.gov/media/044951/download    Test performed by PCR.          XR Chest 1 View    Result Date: 10/7/2021  EXAMINATION: XR CHEST 1 VW-10/07/2021:  INDICATION: SOA, triage protocol.  COMPARISON: NONE.  FINDINGS: AP view of the chest reveals cardiac size borderline enlarged without overt edema. Chronic hyperinflated appearance of obstructive pulmonary disease without pneumothorax or pleural effusion.      Impression: Findings of COPD without focal consolidation. Potential prominence of perihilar lung markings may represent bronchitis without effusion.  D:  10/07/2021 E:  10/07/2021  This report was finalized on 10/7/2021 5:00 PM by Dr. Bijan Willams.            Assessment/Plan   Assessment & Plan       COPD with acute exacerbation (HCC)    Anxiety disorder    Depression    HLD (hyperlipidemia)    HTN (hypertension)    Hypoxia    COPD with exac  Hypoxia   -- nebs q 4 hours and prn  -- solumedrol 60 mg q 8 hrs  -- doxycycline bid for 7 days   -- symbicort bid  -- COPD navigator consult   -- mucinex bid, add delsym as paroxysmal coughing causes dyspnea; prn tessalon   - exhausted, try Ambien qhs prn   -- oxygen to keep sat > 90%  -- just had oxygen ordered for home use this week     Leukocytosis   - likely related to recent steroids  -- check procal     Anxiety  Depression  -- cont buspar and paxil     CAD  HLD  HTN  -- cont statin, ASA, cozaar and cardizem     Chronic back pain  -- follows with pain clinic  -- cont norco   -- decrease Neurontin dose based on CrCl    DVT prophylaxis:  Heparin subq     CODE STATUS:  Full code         This note has been completed as part of a split-shared workflow.   Signature: Electronically signed by BRUCE Whitlock, 10/07/21, 3:58 PM EDT.      Attending   Admission Attestation       I have seen and examined the patient, performing an independent face-to-face diagnostic evaluation with plan of care reviewed and developed with the advanced practice clinician (APC).      Brief Summary Statement:   Pilar Colon is a 76 y.o. female  lifelong smoker (quit a week ago) with COPD, CAD, HTN HL.  She has not been on supplemental oxygen but uses home nebulizers.  She is not very active but usually she can walk in her home and work in the yard without dyspnea on room air.  She developed worsening dyspnea about 5 days ago, was placed on steroids/abx as an outpatient without much improvement, was seen in OSH ED where oxygen sats were in the 80s.  She was prescribed home oxygen but this has not been set up yet.  She comes [facility] ED for ongoing dyspnea.     She denies recent fevers or chills. She denies recent chest pain or pleuritc pain.  She is negative for COVID19 in the ED, and CXR shows COPD changes wihtout infiltrates.  She says that nebs help.      Remainder of detailed HPI is as noted by APC and has been reviewed and/or edited by me for completeness.    Attending Physical Exam:    Constitutional: Awake, alert woman sitting up, very dyspneic on [place] oxygen, trying to eat, having trouble speaking full sentences.  Grandson visiting.   Eyes: PER, sclerae anicteric, no conjunctival injection  HENT: NCAT, mucous membranes moist  Neck: Supple,  trachea midline  Respiratory: Labored resps, mildly tachypneic, prolonged exp phase, diffuse wheezing.   Cardiovascular: RRR, no murmurs, rubs, or gallops,   Gastrointestinal: Positive bowel sounds, soft, nontender, nondistended  Musculoskeletal: No bilateral ankle edema, no clubbing or cyanosis to extremities  Psychiatric: Appropriate affect, cooperative  Neurologic:  Oriented x 3, strength symmetric in all extremities, Cranial Nerves grossly intact to confrontation, speech clear  Skin: No rashes      Brief Assessment/Plan :  See detailed assessment and plan developed with APC which I have reviewed and/or edited for completeness.        Admission Status: I believe that this patient meets INPATIENT status due to severe COPD flare that has failed outpt management.  I feel patient’s risk for adverse outcomes and need for care warrant INPATIENT evaluation and I predict the patient’s care encounter to likely last beyond 2 midnights.        Adilia Skinner MD  10/07/21

## 2021-10-07 NOTE — CASE MANAGEMENT/SOCIAL WORK
Discharge Planning Assessment  [facility]     Patient Name: Pilar Colon  MRN: [id number]  Today's Date: 10/7/2021    Admit Date: 10/7/2021    Discharge Needs Assessment     Row Name 10/07/21 1612       Living Environment    Lives With alone    Unique Family Situation Lives alone in [place]    Current Living Arrangements home/apartment/condo    Primary Care Provided by self    Provides Primary Care For no one    Caregiving Concerns Daughter reports patient is independent with all ADL's    Family Caregiver if Needed child(annel), adult    Family Caregiver Names Daughter involved, Dorothy Margret @ [phone]    Quality of Family Relationships helpful;involved;supportive    Able to Return to Prior Arrangements yes    Living Arrangement Comments Resides in private residence alone       Resource/Environmental Concerns    Resource/Environmental Concerns none    Transportation Concerns car, none       Transition Planning    Patient/Family Anticipates Transition to home    Patient/Family Anticipated Services at Transition     Transportation Anticipated family or friend will provide       Discharge Needs Assessment    Readmission Within the Last 30 Days no previous admission in last 30 days    Equipment Currently Used at Home none    Concerns to be Addressed discharge planning    Concerns Comments Case Management following for all discharge planning    Anticipated Changes Related to Illness none    Equipment Needed After Discharge oxygen    Patient's Choice of Community Agency([facility]) [facility] for home 02    Current Discharge Risk lives alone    Discharge Coordination/Progress Anticipate patient will return home when medically stable        Discharge Plan     Row Name 10/07/21 1616       Plan    Plan Home    Patient/Family in Agreement with Plan -yes Daughter    Plan Comments Planning hospital admission, spoke with sushil Gilbertnifer by telephone.  Daughter reports patient is independent with ADL's, no DME use or  home health involvement.  Was waiting on home 02 to be delivered to home at time of hospital admission, will update floor  to arrange prior to discharge.  Daughter aware CM following, appreciative.    Final Discharge Disposition Code 01 - home or self-care        Continued Care and Services - Admitted Since 10/7/2021    Coordination has not been started for this encounter.         Demographic Summary     Row Name 10/07/21 1611       General Information    Arrived From  emergency department;home    Referral Source  admission list;emergency department    Reason for Consult  discharge planning    Preferred Language  English     Used During This Interaction  no        Functional Status    No documentation.       Psychosocial    No documentation.       Abuse/Neglect    No documentation.       Legal    No documentation.       Substance Abuse    No documentation.       Patient Forms    No documentation.           CARRIE Johnson

## 2021-10-07 NOTE — PLAN OF CARE
Goal Outcome Evaluation:  Plan of Care Reviewed With: patient        Progress: improving  Outcome Summary: Patient arrived to the floor, placed on monitor and oriented to room. VSS, 6LNC, NSR on monitor. Family at bedside. Admission database complete, bed alarm on. No further needs at this time.

## 2021-10-07 NOTE — ED PROVIDER NOTES
Subjective   Pilar Colon is a 76 yr old female presents the emergency department with complaints of shortness of breath.  Patient reports that her shortness of breath started 1 week ago.  Patient has a history of COPD.  Patient has been advised that she needs home oxygen however [facility] has not dropped off the oxygen at this time.  Patient explains that they are to bring it to her home this afternoon.  Patient advises that she lives in [facility].  Patient was seen at the [facility] on Monday and Wednesday for the same complaint.  She advises that she was discharged home both days.  Patient has tested negative for Covid at both visits.  She has been using her neb treatments as prescribed.  She has been taking prednisone as well as Levaquin.  Patient denies any fevers, chills.  She complains of cough and congestion.  Her cough at this time is wet but nonproductive.  She denies chest pain.  Negative for nausea, vomiting.      History provided by:  Patient   used: No    Shortness of Breath  Severity:  Moderate  Timing:  Constant  Worsened by:  Coughing  Associated symptoms: cough and sputum production    Associated symptoms: no chest pain, no fever, no hemoptysis and no vomiting        Review of Systems   Constitutional: Negative for chills and fever.   HENT: Positive for congestion.    Respiratory: Positive for cough, sputum production and shortness of breath. Negative for hemoptysis.    Cardiovascular: Negative for chest pain.   Gastrointestinal: Negative for nausea and vomiting.   Neurological: Negative for dizziness and weakness.   All other systems reviewed and are negative.      Past Medical History:   Diagnosis Date   • Anemia    • Anxiety    • Arthritis    • Bronchitis    • COPD (chronic obstructive pulmonary disease) (HCC)    • Depression    • Diverticulitis    • Emphysema (subcutaneous) (surgical) resulting from a procedure    • History of left heart  catheterization     STENT PLACED   • Hyperlipidemia    • Hypertension    • Low back pain    • Osteoporosis    • Pneumonia        Allergies   Allergen Reactions   • Tramadol Swelling       Past Surgical History:   Procedure Laterality Date   • BREAST BIOPSY Right     [facility], Dr. Severo Kay   • HAND SURGERY Left     Arthritis surgery, [place], [place]   • SHOULDER SURGERY Bilateral     [facility]quez. Dr. Dixon   • TUBAL ABDOMINAL LIGATION         Family History   Problem Relation Age of Onset   • Cancer Father    • Diabetes Sister        Social History     Socioeconomic History   • Marital status: Single     Spouse name: Not on file   • Number of children: Not on file   • Years of education: Not on file   • Highest education level: Not on file   Tobacco Use   • Smoking status: Former Smoker     Packs/day: 0.50     Years: 20.00     Pack years: 10.00   • Smokeless tobacco: Never Used   • Tobacco comment: QUIT 1 WK AGO (10/7/21)   Vaping Use   • Vaping Use: Never used   Substance and Sexual Activity   • Alcohol use: Yes     Comment: 1 Pint per week, SATURDAY NIGHT LAST DRINK    • Drug use: Never   • Sexual activity: Defer           Objective   Physical Exam  Vitals and nursing note reviewed.   Constitutional:       Appearance: Normal appearance. She is well-developed. She is not toxic-appearing.   HENT:      Head: Normocephalic and atraumatic.      Mouth/Throat:      Mouth: Mucous membranes are moist.   Eyes:      General: Lids are normal.      Extraocular Movements: Extraocular movements intact.      Conjunctiva/sclera: Conjunctivae normal.      Pupils: Pupils are equal, round, and reactive to light.   Neck:      Trachea: Trachea normal.   Cardiovascular:      Rate and Rhythm: Normal rate and regular rhythm.      Pulses: Normal pulses.      Heart sounds: Normal heart sounds.   Pulmonary:      Breath sounds: Wheezing and rhonchi present. No decreased breath sounds or rales.      Comments: Breathing is  labored.  Abdominal:      General: Bowel sounds are normal.      Palpations: Abdomen is soft.      Tenderness: There is no abdominal tenderness.   Musculoskeletal:         General: Normal range of motion.      Cervical back: Full passive range of motion without pain and normal range of motion.      Right lower leg: No tenderness. No edema.      Left lower leg: No tenderness. No edema.   Skin:     General: Skin is warm and dry.      Findings: No rash.   Neurological:      Mental Status: She is alert and oriented to person, place, and time.      Cranial Nerves: No cranial nerve deficit.   Psychiatric:         Speech: Speech normal.         Behavior: Behavior normal. Behavior is cooperative.         Procedures           ED Course  ED Course as of Oct 07 1703   Thu Oct 07, 2021   1342 WBC(!): 13.94 [KG]   1555 COVID19: Not Detected [KG]   1555 Influenza A PCR: Not Detected [KG]   1555 Influenza B PCR: Not Detected [KG]   1600 This is the patient's third emergency room visit this week.  Patient was seen in the emergency department in [place] on Monday and Wednesday.  Patient advises that she feels she cannot go home.  She feels she needs to be admitted.  Patient has had multiple episodes where her oxygen will dip down into the low upper 80s.  Patient will be admitted for COPD exacerbation and hypoxia.  Dr. Skinner   with hospitalist was contacted.  She agrees to admit the patient.    [KG]      ED Course User Index  [KG] Gabriela Mon, BRUCE           Recent Results (from the past 24 hour(s))   Comprehensive Metabolic Panel    Collection Time: 10/07/21  1:23 PM    Specimen: Blood   Result Value Ref Range    Glucose 131 (H) 65 - 99 mg/dL    BUN 23 8 - 23 mg/dL    Creatinine 0.94 0.57 - 1.00 mg/dL    Sodium 144 136 - 145 mmol/L    Potassium 3.9 3.5 - 5.2 mmol/L    Chloride 105 98 - 107 mmol/L    CO2 26.0 22.0 - 29.0 mmol/L    Calcium 9.4 8.6 - 10.5 mg/dL    Total Protein 7.4 6.0 - 8.5 g/dL    Albumin 4.60 3.50 - 5.20  g/dL    ALT (SGPT) 19 1 - 33 U/L    AST (SGOT) 36 (H) 1 - 32 U/L    Alkaline Phosphatase 61 39 - 117 U/L    Total Bilirubin 0.3 0.0 - 1.2 mg/dL    eGFR Non African Amer 58 (L) >60 mL/min/1.73    Globulin 2.8 gm/dL    A/G Ratio 1.6 g/dL    BUN/Creatinine Ratio 24.5 7.0 - 25.0    Anion Gap 13.0 5.0 - 15.0 mmol/L   BNP    Collection Time: 10/07/21  1:23 PM    Specimen: Blood   Result Value Ref Range    proBNP 4,884.0 (H) 0.0-1,800.0 pg/mL   Troponin    Collection Time: 10/07/21  1:23 PM    Specimen: Blood   Result Value Ref Range    Troponin T 0.012 0.000 - 0.030 ng/mL   Green Top (Gel)    Collection Time: 10/07/21  1:23 PM   Result Value Ref Range    Extra Tube Hold for add-ons.    Lavender Top    Collection Time: 10/07/21  1:23 PM   Result Value Ref Range    Extra Tube hold for add-on    Gold Top - SST    Collection Time: 10/07/21  1:23 PM   Result Value Ref Range    Extra Tube Hold for add-ons.    Light Blue Top    Collection Time: 10/07/21  1:23 PM   Result Value Ref Range    Extra Tube hold for add-on    CBC Auto Differential    Collection Time: 10/07/21  1:23 PM    Specimen: Blood   Result Value Ref Range    WBC 13.94 (H) 3.40 - 10.80 10*3/mm3    RBC 4.03 3.77 - 5.28 10*6/mm3    Hemoglobin 11.5 (L) 12.0 - 15.9 g/dL    Hematocrit 35.8 34.0 - 46.6 %    MCV 88.8 79.0 - 97.0 fL    MCH 28.5 26.6 - 33.0 pg    MCHC 32.1 31.5 - 35.7 g/dL    RDW 15.7 (H) 12.3 - 15.4 %    RDW-SD 51.0 37.0 - 54.0 fl    MPV 10.2 6.0 - 12.0 fL    Platelets 344 140 - 450 10*3/mm3    Neutrophil % 88.3 (H) 42.7 - 76.0 %    Lymphocyte % 6.7 (L) 19.6 - 45.3 %    Monocyte % 4.1 (L) 5.0 - 12.0 %    Eosinophil % 0.1 (L) 0.3 - 6.2 %    Basophil % 0.1 0.0 - 1.5 %    Immature Grans % 0.7 (H) 0.0 - 0.5 %    Neutrophils, Absolute 12.32 (H) 1.70 - 7.00 10*3/mm3    Lymphocytes, Absolute 0.93 0.70 - 3.10 10*3/mm3    Monocytes, Absolute 0.57 0.10 - 0.90 10*3/mm3    Eosinophils, Absolute 0.01 0.00 - 0.40 10*3/mm3    Basophils, Absolute 0.01 0.00 - 0.20  10*3/mm3    Immature Grans, Absolute 0.10 (H) 0.00 - 0.05 10*3/mm3    nRBC 0.0 0.0 - 0.2 /100 WBC   Procalcitonin    Collection Time: 10/07/21  1:23 PM    Specimen: Blood   Result Value Ref Range    Procalcitonin 0.05 0.00 - 0.25 ng/mL   ECG 12 Lead    Collection Time: 10/07/21  1:26 PM   Result Value Ref Range    QT Interval 372 ms    QTC Interval 457 ms   COVID-19 and FLU A/B PCR - Swab, Nasopharynx    Collection Time: 10/07/21  3:18 PM    Specimen: Nasopharynx; Swab   Result Value Ref Range    COVID19 Not Detected Not Detected - Ref. Range    Influenza A PCR Not Detected Not Detected    Influenza B PCR Not Detected Not Detected     Note: In addition to lab results from this visit, the labs listed above may include labs taken at another facility or during a different encounter within the last 24 hours. Please correlate lab times with ED admission and discharge times for further clarification of the services performed during this visit.    XR Chest 1 View   Preliminary Result   Findings of COPD without focal consolidation. Potential   prominence of perihilar lung markings may represent bronchitis without   effusion.       D:  10/07/2021   E:  10/07/2021                    Vitals:    10/07/21 1532 10/07/21 1600 10/07/21 1620 10/07/21 1621   BP:  144/77 150/72    BP Location:       Patient Position:       Pulse: 96 95  95   Resp: 26      Temp:       TempSrc:       SpO2: 96% 96%  95%   Weight:       Height:         Medications   sodium chloride 0.9 % flush 10 mL (has no administration in time range)   sodium chloride 0.9 % flush 10 mL (has no administration in time range)   ipratropium-albuterol (DUO-NEB) nebulizer solution 3 mL (3 mL Nebulization Given 10/7/21 1405)   ipratropium-albuterol (DUO-NEB) nebulizer solution 3 mL (3 mL Nebulization Given 10/7/21 1532)   methylPREDNISolone sodium succinate (SOLU-Medrol) injection 125 mg (125 mg Intravenous Given 10/7/21 1519)   HYDROcod Polst-CPM Polst ER (TUSSIONEX  PENNKINETIC) 10-8 MG/5ML ER suspension 5 mL (5 mL Oral Given 10/7/21 1605)     ECG/EMG Results (last 24 hours)     Procedure Component Value Units Date/Time    ECG 12 Lead [[id number]] Collected: 10/07/21 1326     Updated: 10/07/21 1550     QT Interval 372 ms      QTC Interval 457 ms     Narrative:      Test Reason : SOA Protocol  Blood Pressure :   */*   mmHG  Vent. Rate :  91 BPM     Atrial Rate :  91 BPM     P-R Int : 166 ms          QRS Dur :  68 ms      QT Int : 372 ms       P-R-T Axes :  65  59  59 degrees     QTc Int : 457 ms    Sinus rhythm with premature atrial complexes with aberrant conduction  Possible Left atrial enlargement  Nonspecific ST and T wave abnormality  Abnormal ECG  Confirmed by MD Cai Michael [id number]) on 10/7/2021 3:50:32 PM    Referred By: ED MD           Confirmed By: Solomon Cai MD        ECG 12 Lead   Final Result   Test Reason : SOA Protocol   Blood Pressure :   */*   mmHG   Vent. Rate :  91 BPM     Atrial Rate :  91 BPM      P-R Int : 166 ms          QRS Dur :  68 ms       QT Int : 372 ms       P-R-T Axes :  65  59  59 degrees      QTc Int : 457 ms      Sinus rhythm with premature atrial complexes with aberrant conduction   Possible Left atrial enlargement   Nonspecific ST and T wave abnormality   Abnormal ECG   Confirmed by MD Cai Michael ([id number]) on 10/7/2021 3:50:32 PM      Referred By: ED MD           Confirmed By: Solomon Cai MD      ECG 12 Lead    (Results Pending)                                       MDM    Final diagnoses:   COPD exacerbation (HCC)   Hypoxia       ED Disposition  ED Disposition     ED Disposition Condition Comment    Decision to Admit  Level of Care: Telemetry [5]   Admitting Physician: SONIYA FOOTE [[id number]]            No follow-up provider specified.       Medication List      No changes were made to your prescriptions during this visit.          Gabriela Mon, APRN  10/07/21 0046

## 2021-10-08 LAB
ANION GAP SERPL CALCULATED.3IONS-SCNC: 9 MMOL/L (ref 5–15)
BASOPHILS # BLD AUTO: 0 10*3/MM3 (ref 0–0.2)
BASOPHILS NFR BLD AUTO: 0 % (ref 0–1.5)
BUN SERPL-MCNC: 26 MG/DL (ref 8–23)
BUN/CREAT SERPL: 31 (ref 7–25)
CALCIUM SPEC-SCNC: 8.7 MG/DL (ref 8.6–10.5)
CHLORIDE SERPL-SCNC: 101 MMOL/L (ref 98–107)
CO2 SERPL-SCNC: 30 MMOL/L (ref 22–29)
CREAT SERPL-MCNC: 0.84 MG/DL (ref 0.57–1)
DEPRECATED RDW RBC AUTO: 48.2 FL (ref 37–54)
EOSINOPHIL # BLD AUTO: 0 10*3/MM3 (ref 0–0.4)
EOSINOPHIL NFR BLD AUTO: 0 % (ref 0.3–6.2)
ERYTHROCYTE [DISTWIDTH] IN BLOOD BY AUTOMATED COUNT: 15.2 % (ref 12.3–15.4)
GFR SERPL CREATININE-BSD FRML MDRD: 66 ML/MIN/1.73
GLUCOSE SERPL-MCNC: 154 MG/DL (ref 65–99)
HCT VFR BLD AUTO: 32.5 % (ref 34–46.6)
HGB BLD-MCNC: 10.8 G/DL (ref 12–15.9)
IMM GRANULOCYTES # BLD AUTO: 0.05 10*3/MM3 (ref 0–0.05)
IMM GRANULOCYTES NFR BLD AUTO: 0.9 % (ref 0–0.5)
LYMPHOCYTES # BLD AUTO: 0.75 10*3/MM3 (ref 0.7–3.1)
LYMPHOCYTES NFR BLD AUTO: 12.8 % (ref 19.6–45.3)
MCH RBC QN AUTO: 29 PG (ref 26.6–33)
MCHC RBC AUTO-ENTMCNC: 33.2 G/DL (ref 31.5–35.7)
MCV RBC AUTO: 87.1 FL (ref 79–97)
MONOCYTES # BLD AUTO: 0.29 10*3/MM3 (ref 0.1–0.9)
MONOCYTES NFR BLD AUTO: 5 % (ref 5–12)
NEUTROPHILS NFR BLD AUTO: 4.76 10*3/MM3 (ref 1.7–7)
NEUTROPHILS NFR BLD AUTO: 81.3 % (ref 42.7–76)
NRBC BLD AUTO-RTO: 0 /100 WBC (ref 0–0.2)
PLATELET # BLD AUTO: 274 10*3/MM3 (ref 140–450)
PMV BLD AUTO: 10.4 FL (ref 6–12)
POTASSIUM SERPL-SCNC: 3.6 MMOL/L (ref 3.5–5.2)
QT INTERVAL: 380 MS
QTC INTERVAL: 487 MS
RBC # BLD AUTO: 3.73 10*6/MM3 (ref 3.77–5.28)
SODIUM SERPL-SCNC: 140 MMOL/L (ref 136–145)
WBC # BLD AUTO: 5.85 10*3/MM3 (ref 3.4–10.8)

## 2021-10-08 PROCEDURE — 94799 UNLISTED PULMONARY SVC/PX: CPT

## 2021-10-08 PROCEDURE — 99233 SBSQ HOSP IP/OBS HIGH 50: CPT | Performed by: INTERNAL MEDICINE

## 2021-10-08 PROCEDURE — 94660 CPAP INITIATION&MGMT: CPT

## 2021-10-08 PROCEDURE — 85025 COMPLETE CBC W/AUTO DIFF WBC: CPT | Performed by: NURSE PRACTITIONER

## 2021-10-08 PROCEDURE — 97162 PT EVAL MOD COMPLEX 30 MIN: CPT

## 2021-10-08 PROCEDURE — 25010000002 METHYLPREDNISOLONE PER 125 MG: Performed by: NURSE PRACTITIONER

## 2021-10-08 PROCEDURE — 97530 THERAPEUTIC ACTIVITIES: CPT

## 2021-10-08 PROCEDURE — 25010000002 HEPARIN (PORCINE) PER 1000 UNITS: Performed by: NURSE PRACTITIONER

## 2021-10-08 PROCEDURE — 80048 BASIC METABOLIC PNL TOTAL CA: CPT | Performed by: NURSE PRACTITIONER

## 2021-10-08 RX ADMIN — METHYLPREDNISOLONE SODIUM SUCCINATE 60 MG: 125 INJECTION, POWDER, FOR SOLUTION INTRAMUSCULAR; INTRAVENOUS at 21:18

## 2021-10-08 RX ADMIN — IPRATROPIUM BROMIDE AND ALBUTEROL SULFATE 3 ML: 2.5; .5 SOLUTION RESPIRATORY (INHALATION) at 11:26

## 2021-10-08 RX ADMIN — LOSARTAN POTASSIUM 50 MG: 50 TABLET, FILM COATED ORAL at 07:51

## 2021-10-08 RX ADMIN — ASPIRIN 325 MG ORAL TABLET 325 MG: 325 PILL ORAL at 07:50

## 2021-10-08 RX ADMIN — MECLIZINE 25 MG: 12.5 TABLET ORAL at 21:15

## 2021-10-08 RX ADMIN — Medication 60 MG: at 07:52

## 2021-10-08 RX ADMIN — BUDESONIDE AND FORMOTEROL FUMARATE DIHYDRATE 2 PUFF: 80; 4.5 AEROSOL RESPIRATORY (INHALATION) at 19:57

## 2021-10-08 RX ADMIN — BUDESONIDE AND FORMOTEROL FUMARATE DIHYDRATE 2 PUFF: 80; 4.5 AEROSOL RESPIRATORY (INHALATION) at 07:32

## 2021-10-08 RX ADMIN — PANTOPRAZOLE SODIUM 40 MG: 40 TABLET, DELAYED RELEASE ORAL at 07:51

## 2021-10-08 RX ADMIN — SODIUM CHLORIDE, PRESERVATIVE FREE 10 ML: 5 INJECTION INTRAVENOUS at 21:18

## 2021-10-08 RX ADMIN — IPRATROPIUM BROMIDE AND ALBUTEROL SULFATE 3 ML: 2.5; .5 SOLUTION RESPIRATORY (INHALATION) at 23:37

## 2021-10-08 RX ADMIN — HEPARIN SODIUM 5000 UNITS: 5000 INJECTION, SOLUTION INTRAVENOUS; SUBCUTANEOUS at 21:11

## 2021-10-08 RX ADMIN — DOXYCYCLINE 100 MG: 100 INJECTION, POWDER, LYOPHILIZED, FOR SOLUTION INTRAVENOUS at 05:38

## 2021-10-08 RX ADMIN — GUAIFENESIN 1200 MG: 600 TABLET, EXTENDED RELEASE ORAL at 07:51

## 2021-10-08 RX ADMIN — GABAPENTIN 300 MG: 300 CAPSULE ORAL at 05:38

## 2021-10-08 RX ADMIN — DOXYCYCLINE 100 MG: 100 INJECTION, POWDER, LYOPHILIZED, FOR SOLUTION INTRAVENOUS at 18:16

## 2021-10-08 RX ADMIN — GABAPENTIN 300 MG: 300 CAPSULE ORAL at 12:12

## 2021-10-08 RX ADMIN — IPRATROPIUM BROMIDE AND ALBUTEROL SULFATE 3 ML: 2.5; .5 SOLUTION RESPIRATORY (INHALATION) at 02:42

## 2021-10-08 RX ADMIN — PRAVASTATIN SODIUM 80 MG: 40 TABLET ORAL at 21:15

## 2021-10-08 RX ADMIN — GUAIFENESIN 1200 MG: 600 TABLET, EXTENDED RELEASE ORAL at 21:15

## 2021-10-08 RX ADMIN — PAROXETINE HYDROCHLORIDE 40 MG: 20 TABLET, FILM COATED ORAL at 07:51

## 2021-10-08 RX ADMIN — BUSPIRONE HYDROCHLORIDE 10 MG: 10 TABLET ORAL at 07:50

## 2021-10-08 RX ADMIN — DOCUSATE SODIUM 50 MG AND SENNOSIDES 8.6 MG 2 TABLET: 8.6; 5 TABLET, FILM COATED ORAL at 07:50

## 2021-10-08 RX ADMIN — SODIUM CHLORIDE, PRESERVATIVE FREE 10 ML: 5 INJECTION INTRAVENOUS at 07:52

## 2021-10-08 RX ADMIN — HYDROCODONE BITARTRATE AND ACETAMINOPHEN 1 TABLET: 10; 325 TABLET ORAL at 07:50

## 2021-10-08 RX ADMIN — ZOLPIDEM TARTRATE 5 MG: 5 TABLET ORAL at 21:15

## 2021-10-08 RX ADMIN — Medication 60 MG: at 21:10

## 2021-10-08 RX ADMIN — GABAPENTIN 300 MG: 300 CAPSULE ORAL at 21:15

## 2021-10-08 RX ADMIN — METHYLPREDNISOLONE SODIUM SUCCINATE 60 MG: 125 INJECTION, POWDER, FOR SOLUTION INTRAMUSCULAR; INTRAVENOUS at 12:12

## 2021-10-08 RX ADMIN — IPRATROPIUM BROMIDE AND ALBUTEROL SULFATE 3 ML: 2.5; .5 SOLUTION RESPIRATORY (INHALATION) at 15:48

## 2021-10-08 RX ADMIN — IPRATROPIUM BROMIDE AND ALBUTEROL SULFATE 3 ML: 2.5; .5 SOLUTION RESPIRATORY (INHALATION) at 07:32

## 2021-10-08 RX ADMIN — IPRATROPIUM BROMIDE AND ALBUTEROL SULFATE 3 ML: 2.5; .5 SOLUTION RESPIRATORY (INHALATION) at 19:57

## 2021-10-08 RX ADMIN — DILTIAZEM HYDROCHLORIDE 240 MG: 240 CAPSULE, COATED, EXTENDED RELEASE ORAL at 07:51

## 2021-10-08 RX ADMIN — HEPARIN SODIUM 5000 UNITS: 5000 INJECTION, SOLUTION INTRAVENOUS; SUBCUTANEOUS at 07:52

## 2021-10-08 RX ADMIN — MECLIZINE 25 MG: 12.5 TABLET ORAL at 07:50

## 2021-10-08 RX ADMIN — METHYLPREDNISOLONE SODIUM SUCCINATE 60 MG: 125 INJECTION, POWDER, FOR SOLUTION INTRAMUSCULAR; INTRAVENOUS at 05:38

## 2021-10-08 NOTE — CASE MANAGEMENT/SOCIAL WORK
Continued Stay Note  [facility]     Patient Name: Pilar Colon  MRN: [id number]  Today's Date: 10/8/2021    Admit Date: 10/7/2021    Discharge Plan     Row Name 10/08/21 1140       Plan    Plan  Home    Patient/Family in Agreement with Plan  yes    Plan Comments  CM asked to arrange home O2 for patient.  Unable to speak with anyone at [facility].  Referral called to Demario with [facility].  Portable tank to be delivered to patient's bedside prior to discharge.  Updated patient in room.  CM will continue to follow.  Zoila Kwok RN [phone]    Final Discharge Disposition Code  01 - home or self-care    Row Name 10/08/21 1007       Plan    Plan  Home    Patient/Family in Agreement with Plan  yes    Plan Comments  Patient's goal remains to return home at discharge.  Will await therapy recommendations to determine proper discharge palcement.  CM will continue to follow.  Zoila Kwok RN [phone]    Final Discharge Disposition Code  01 - home or self-care        Discharge Codes    No documentation.       Expected Discharge Date and Time     Expected Discharge Date Expected Discharge Time    Oct 11, 2021             Zoila Kwok RN

## 2021-10-08 NOTE — THERAPY EVALUATION
"Patient Name: Pilar Colon  : 1945    MRN: [id number]                              Today's Date: 10/8/2021       Admit Date: 10/7/2021    Visit Dx:     ICD-10-CM ICD-9-CM   1. COPD exacerbation (HCC)  J44.1 491.21   2. Hypoxia  R09.02 799.02     Patient Active Problem List   Diagnosis   • COPD with acute exacerbation (HCC)   • Anxiety disorder   • Depression   • HLD (hyperlipidemia)   • HTN (hypertension)   • Hypoxia   • COPD exacerbation (HCC)     Past Medical History:   Diagnosis Date   • Anemia    • Anxiety    • Arthritis    • Bronchitis    • COPD (chronic obstructive pulmonary disease) (HCC)    • Depression    • Diverticulitis    • Emphysema (subcutaneous) (surgical) resulting from a procedure    • History of left heart catheterization     STENT PLACED   • Hyperlipidemia    • Hypertension    • Low back pain    • Osteoporosis    • Pneumonia      Past Surgical History:   Procedure Laterality Date   • BREAST BIOPSY Right     [place], Dr. Severo Kay   • HAND SURGERY Left     Arthritis surgery, [place], [place]   • SHOULDER SURGERY Bilateral     [place]. Dr. Dixon   • TUBAL ABDOMINAL LIGATION       General Information     Row Name 10/08/21 1545          Physical Therapy Time and Intention    Document Type  evaluation  -SS     Mode of Treatment  physical therapy  -SS     Row Name 10/08/21 1545          General Information    Patient Profile Reviewed  yes  -SS     Prior Level of Function  independent:;all household mobility;gait;transfer;bed mobility;using stairs pending day/SOA: able to ambulate in community; no AD but recently decline in distance ambulated prior to SOA/\"flare up\"  -SS     Existing Precautions/Restrictions  fall;oxygen therapy device and L/min  -SS     Barriers to Rehab  medically complex;previous functional deficit  -SS     Row Name 10/08/21 1545          Living Environment    Lives With  child(annel), adult  -SS     Row Name 10/08/21 1545          Home Main Entrance    Number of Stairs, " Main Entrance  one  -SS     Stair Railings, Main Entrance  none  -SS     Row Name 10/08/21 1545          Stairs Within Home, Primary    Number of Stairs, Within Home, Primary  none  -SS     Row Name 10/08/21 1545          Cognition    Orientation Status (Cognition)  oriented x 3  -SS     Row Name 10/08/21 1545          Safety Issues, Functional Mobility    Safety Issues Affecting Function (Mobility)  awareness of need for assistance;insight into deficits/self-awareness;judgment;problem-solving;safety precaution awareness;safety precautions follow-through/compliance;sequencing abilities  -     Impairments Affecting Function (Mobility)  balance;endurance/activity tolerance;postural/trunk control;shortness of breath;strength  -       User Key  (r) = Recorded By, (t) = Taken By, (c) = Cosigned By    Initials Name Provider Type     Ruth Garibay, PT Physical Therapist        Mobility     Row Name 10/08/21 1549          Bed Mobility    Comment (Bed Mobility)  pt. up in chair  -     Row Name 10/08/21 1549          Transfers    Comment (Transfers)  VC for hand placement, lowering with eccentric control  -     Row Name 10/08/21 1549          Sit-Stand Transfer    Sit-Stand [place] (Transfers)  contact guard;verbal cues  -     Assistive Device (Sit-Stand Transfers)  walker, front-wheeled  -     Row Name 10/08/21 1549          Gait/Stairs (Locomotion)    [place] Level (Gait)  contact guard  -     Assistive Device (Gait)  other (see comments) IV pole  -     Distance in Feet (Gait)  75  -     Deviations/Abnormal Patterns (Gait)  bilateral deviations;elliott decreased;gait speed decreased;stride length decreased  -     Bilateral Gait Deviations  forward flexed posture;heel strike decreased  -     Comment (Gait/Stairs)  Pt. ambulated with a step through gait pattern at a decreased speed. VC for safety recommendations, controlled breathing. Gait limited by SOA, fatigue, weakness.  -       User  Key  (r) = Recorded By, (t) = Taken By, (c) = Cosigned By    Initials Name Provider Type     Ruth Garibay, NOLAN Physical Therapist        Obj/Interventions     Row Name 10/08/21 1550          Range of Motion Comprehensive    General Range of Motion  bilateral lower extremity ROM WFL  -     Row Name 10/08/21 1550          Strength Comprehensive (MMT)    Comment, General Manual Muscle Testing (MMT) Assessment  BLE gross 4-/5  -     Row Name 10/08/21 1550          Motor Skills    Motor Skills  functional endurance  -     Functional Endurance  moderate impairment  -     Row Name 10/08/21 1550          Balance    Balance Assessment  sitting static balance;sitting dynamic balance;standing static balance;standing dynamic balance  -     Static Sitting Balance  WFL;sitting in chair;unsupported  -     Dynamic Sitting Balance  WFL;unsupported;sitting in chair  -     Static Standing Balance  WFL;supported  -     Dynamic Standing Balance  mild impairment;supported  -     Balance Interventions  sitting;standing;sit to stand;supported;static;dynamic;minimal challenge  -     Row Name 10/08/21 1550          Sensory Assessment (Somatosensory)    Sensory Assessment (Somatosensory)  LE sensation intact  -       User Key  (r) = Recorded By, (t) = Taken By, (c) = Cosigned By    Initials Name Provider Type     Ruth Garibay PT Physical Therapist        Goals/Plan     Row Name 10/08/21 1556          Bed Mobility Goal 1 (PT)    Activity/Assistive Device (Bed Mobility Goal 1, PT)  bed mobility activities, all  -     [place] Level/Cues Needed (Bed Mobility Goal 1, PT)  independent  -     Time Frame (Bed Mobility Goal 1, PT)  long term goal (LTG);10 days  -     Row Name 10/08/21 1556          Transfer Goal 1 (PT)    Activity/Assistive Device (Transfer Goal 1, PT)  transfers, all;sit-to-stand/stand-to-sit;bed-to-chair/chair-to-bed  -SS     [place] Level/Cues Needed (Transfer Goal 1, PT)  independent   -     Time Frame (Transfer Goal 1, PT)  long term goal (LTG);10 days  -     Row Name 10/08/21 1556          Gait Training Goal 1 (PT)    Activity/Assistive Device (Gait Training Goal 1, PT)  gait (walking locomotion);assistive device use;walker, rolling  -     [place] Level (Gait Training Goal 1, PT)  modified independence  -     Distance (Gait Training Goal 1, PT)  150  -SS       User Key  (r) = Recorded By, (t) = Taken By, (c) = Cosigned By    Initials Name Provider Type     Ruth Garibay, PT Physical Therapist        Clinical Impression     Row Name 10/08/21 1553          Pain    Additional Documentation  Pain Scale: Numbers Pre/Post-Treatment (Group)  -     Row Name 10/08/21 0163          Pain Scale: Numbers Pre/Post-Treatment    Pretreatment Pain Rating  0/10 - no pain  -     Posttreatment Pain Rating  0/10 - no pain  -     Pain Intervention(s)  Ambulation/increased activity;Repositioned  -     Row Name 10/08/21 6013          Plan of Care Review    Plan of Care Reviewed With  patient;grandchild(annel)  -     Outcome Summary  PT eval complete. Pt. performed sit to stand transfer w/contact guard assist. Pt. ambulated 75' w/IV pole, contact guard assist. Gait limited by fatigue, SOA, weakness. O2 desat to 84% w/ambulation. Recommend trialing rollator vs. rolling walker for energy conservation in subsequent visits. Recommend inpatient rehab upon discharge.  -     Row Name 10/08/21 8207          Therapy Assessment/Plan (PT)    Rehab Potential (PT)  good, to achieve stated therapy goals  -     Criteria for Skilled Interventions Met (PT)  yes;meets criteria;skilled treatment is necessary  -     Row Name 10/08/21 1414          Vital Signs    Pre Systolic BP Rehab  136  -SS     Pre Treatment Diastolic BP  71  -SS     Post Systolic BP Rehab  142  -SS     Post Treatment Diastolic BP  67  -SS     Pretreatment Heart Rate (beats/min)  77  -SS     Posttreatment Heart Rate (beats/min)  79  -SS      Pre SpO2 (%)  93  -SS     O2 Delivery Pre Treatment  room air  -SS     Intra SpO2 (%)  84  -SS     O2 Delivery Intra Treatment  room air  -SS     Post SpO2 (%)  92  -SS     O2 Delivery Post Treatment  room air  -SS     Pre Patient Position  Sitting  -SS     Intra Patient Position  Standing  -SS     Post Patient Position  Sitting  -SS     Row Name 10/08/21 1553          Positioning and Restraints    Pre-Treatment Position  sitting in chair/recliner  -SS     Post Treatment Position  chair  -SS     In Chair  notified nsg;reclined;call light within reach;encouraged to call for assist;exit alarm on;with family/caregiver;legs elevated  -SS       User Key  (r) = Recorded By, (t) = Taken By, (c) = Cosigned By    Initials Name Provider Type    Ruth Pan PT Physical Therapist        Outcome Measures     Row Name 10/08/21 1557 10/08/21 0757       How much help from another person do you currently need...    Turning from your back to your side while in flat bed without using bedrails?  3  -SS  4  -LC    Moving from lying on back to sitting on the side of a flat bed without bedrails?  3  -SS  3  -LC    Moving to and from a bed to a chair (including a wheelchair)?  3  -SS  3  -LC    Standing up from a chair using your arms (e.g., wheelchair, bedside chair)?  3  -SS  2  -LC    Climbing 3-5 steps with a railing?  2  -SS  2  -LC    To walk in hospital room?  3  -SS  2  -LC    AM-PAC 6 Clicks Score (PT)  17  -SS  16  -LC    Row Name 10/08/21 1557          Functional Assessment    Outcome Measure Options  AM-PAC 6 Clicks Basic Mobility (PT)  -       User Key  (r) = Recorded By, (t) = Taken By, (c) = Cosigned By    Initials Name Provider Type    Avis Tan RN Registered Nurse    Ruth Pan PT Physical Therapist                       Physical Therapy Education                 Title: PT OT SLP Therapies (In Progress)     Topic: Physical Therapy (In Progress)     Point: Mobility training (Done)     Learning  Progress Summary           Patient Acceptance, E, VU,NR by  at 10/8/2021 1557    Comment: Educated pt. safety/technique with transfers, ambulation, benefits of rollator, benefits of acute rehab, PT POC   Family Acceptance, E, VU,NR by SS at 10/8/2021 1557    Comment: Educated pt. safety/technique with transfers, ambulation, benefits of rollator, benefits of acute rehab, PT POC                   Point: Home exercise program (Not Started)     Learner Progress:  Not documented in this visit.          Point: Body mechanics (Done)     Learning Progress Summary           Patient Acceptance, E, VU,NR by SS at 10/8/2021 1557    Comment: Educated pt. safety/technique with transfers, ambulation, benefits of rollator, benefits of acute rehab, PT POC   Family Acceptance, E, VU,NR by SS at 10/8/2021 1557    Comment: Educated pt. safety/technique with transfers, ambulation, benefits of rollator, benefits of acute rehab, PT POC                   Point: Precautions (Done)     Learning Progress Summary           Patient Acceptance, E, VU,NR by  at 10/8/2021 1557    Comment: Educated pt. safety/technique with transfers, ambulation, benefits of rollator, benefits of acute rehab, PT POC   Family Acceptance, E, VU,NR by  at 10/8/2021 1557    Comment: Educated pt. safety/technique with transfers, ambulation, benefits of rollator, benefits of acute rehab, PT POC                               User Key     Initials Effective Dates Name Provider Type Discipline     06/01/21 -  Ruth Garibay, PT Physical Therapist PT              PT Recommendation and Plan  Planned Therapy Interventions (PT): balance training, bed mobility training, gait training, home exercise program, neuromuscular re-education, patient/family education, strengthening, transfer training  Plan of Care Reviewed With: patient, grandchild(annel)  Outcome Summary: PT eval complete. Pt. performed sit to stand transfer w/contact guard assist. Pt. ambulated 75' w/IV pole,  contact guard assist. Gait limited by fatigue, SOA, weakness. O2 desat to 84% w/ambulation. Recommend trialing rollator vs. rolling walker for energy conservation in subsequent visits. Recommend inpatient rehab upon discharge.     Time Calculation:   PT Charges     Row Name 10/08/21 1558             Time Calculation    Start Time  1434  -SS      Stop Time  1450  -SS      Time Calculation (min)  16 min  -SS      PT Received On  10/08/21  -SS      PT Goal Re-Cert Due Date  10/18/21  -SS         Timed Charges    50731 - PT Therapeutic Activity Minutes  8  -SS         Untimed Charges    PT Eval/Re-eval Minutes  35  -SS         Total Minutes    Timed Charges Total Minutes  8  -SS      Untimed Charges Total Minutes  35  -SS       Total Minutes  43  -SS        User Key  (r) = Recorded By, (t) = Taken By, (c) = Cosigned By    Initials Name Provider Type    SS Ruth Garibay, PT Physical Therapist        Therapy Charges for Today     Code Description Service Date Service Provider Modifiers Qty    58460565399 HC PT THERAPEUTIC ACT EA 15 MIN 10/8/2021 Ruth Garibay, PT GP 1    68873879031 HC PT EVAL MOD COMPLEXITY 3 10/8/2021 Ruth Garibay, PT GP 1          PT G-Codes  Outcome Measure Options: AM-PAC 6 Clicks Basic Mobility (PT)  AM-PAC 6 Clicks Score (PT): 17    Ruth Garibay PT  10/8/2021

## 2021-10-08 NOTE — CONSULTS
"  Referring Provider: MD Christos  Reason for Consultation: AECOPD    Subjective .   Education:  NN spoke with pt at BS.  Pt alert and able to answer questions appropriately.  Pt O2 sat  94% on  RA currently, home O2 use  2L.  Pt reports that she has home O2 ordered and since that it has not been delivered yet that she has been borrowing a friends tank.  Pt reports the ability to ambulate ~15 feet at baseline before experiencing SOB.  Pt states use of rescue medication 2  times weekly, relief of SOB within ~2    mins.  Patient is up to date on COVID and PNA vaccines.    Patient is a current tobacco user.  Tobacco cessation encouraged.  Patient states that their current level of motivation is  9 /10.  Discussion of smoking cessation consisted of assessment interview, provision of community resources, counseling on tobacco dependence, nonpharmacologic cessation techniques, medications and relapse prevention.   Smoking cessation education completed. Cessation support resources discussed with pt. This discussion lasted between  3 and  5 minutes. Pt reports someissues at this time with medications or transportation for appointments.  She states that inhalers are expensive for her so she borrows from a friend and she states that she has a friend who can drive her places if she is unable to drive herself.  Pt reports no previous hx of formal COPD teaching, no understanding of action plan, or ND.  Stop light report, NN contact information, instructions for accessing [facility] and list of educational videos given to pt.  \"A Patient's Guide to COPD\" booklet left at BS. 1800QUITNOW reference sheet discussed and given to patient at BS.     COPD education completed in the form of explanation, handouts, and videos.  Home O2 safety discussed. No new concerns or questions voiced at this time.  NN will continue to follow as needed.     Age: 76 y.o.  Sex: female  Smoker Status: current, pack years unclear  Pulmonologist: NEVILLE  FEV1 (PFT): " NA  Home O2: RA    Objective     SpO2 SpO2: 99 % (10/08/21 1139)  Device Device (Oxygen Therapy): nasal cannula (10/08/21 1139)  Flow Flow (L/min): 2 (10/08/21 1139)  Incentive Spirometer    IS Predicted Level (mL)     Number of Repetitions     Level Incentive Spirometer (mL)    Patient Tolerance     Inhaler Treatment Status Respiratory Treatment Status (Inhaler): given (10/07/21 2003)  Treatment Route Respiratory Treatment Status (Inhaler): given (10/07/21 2003)      Home Medications:  Medications Prior to Admission   Medication Sig Dispense Refill Last Dose   • aspirin 325 MG tablet Take 325 mg by mouth Daily. OTC   10/6/2021 at Unknown time   • benzonatate (TESSALON) 100 MG capsule Take 100 mg by mouth 3 (Three) Times a Day As Needed for Cough. For 10 days, started on 10-07-21   10/7/2021 at Unknown time   • busPIRone (BUSPAR) 10 MG tablet Take 10 mg by mouth 2 (two) times a day.   10/6/2021 at Unknown time   • dilTIAZem (TIAZAC) 240 MG 24 hr capsule Take 240 mg by mouth Daily.   10/6/2021 at Unknown time   • gabapentin (NEURONTIN) 400 MG capsule Take 400 mg by mouth 3 (Three) Times a Day.   10/6/2021 at Unknown time   • HYDROcodone-acetaminophen (NORCO)  MG per tablet TAKE 1 TABLET BY MOUTH EVERY 6 HOURS. DO NOT FILL ANY EARLIER THAN 30 DAYS   10/6/2021 at Unknown time   • ipratropium-albuterol (DUO-NEB) 0.5-2.5 mg/3 ml nebulizer Take 3 mL by nebulization Every 4 (Four) Hours As Needed for Wheezing or Shortness of Air.   10/7/2021 at Unknown time   • levoFLOXacin (LEVAQUIN) 250 MG tablet Take 250 mg by mouth Daily. For 10 days, started on 10-04-21   10/6/2021 at Unknown time   • losartan (COZAAR) 50 MG tablet Take 50 mg by mouth Daily.   10/6/2021 at Unknown time   • meclizine (ANTIVERT) 25 MG tablet Take 25 mg by mouth 3 (Three) Times a Day As Needed for Dizziness or Nausea.   Past Week at Unknown time   • omeprazole (priLOSEC) 40 MG capsule Take 40 mg by mouth Daily.   10/6/2021 at Unknown time   •  PARoxetine (PAXIL) 40 MG tablet Take 40 mg by mouth Every Morning.   10/6/2021 at Unknown time   • pravastatin (PRAVACHOL) 80 MG tablet Take 80 mg by mouth Every Night.   10/6/2021 at Unknown time   • predniSONE (DELTASONE) 10 MG tablet Take  by mouth. For 10 days, started on 10-04-21    5 tabs x 2 days  4 tabs x 2 days  3 tabs x 2 days  2 tabs x2 days  1 tab x2 days   10/6/2021 at Unknown time       Discussion: Per current [facility] Standards, please consider: No LAMA/LABA/ICS in place, Outpatient PFT, Pulmonary rehab as apporpiate, Outpatient pulmonary referral      Discussed with primary RN and Attending Hospitalist    Cheryl Tom RN

## 2021-10-08 NOTE — PLAN OF CARE
Goal Outcome Evaluation:   See significant note. After rapid response VSS the rest of shift on bipap. Pt resting, easily aroused, A/Ox4. Lung sounds somewhat improved. No complaints of pain. NSR with PVCs. Pt denies any needs at this time.

## 2021-10-08 NOTE — PLAN OF CARE
Goal Outcome Evaluation:  Plan of Care Reviewed With: patient, grandchild(annel)           Outcome Summary: PT eval complete. Pt. performed sit to stand transfer w/contact guard assist. Pt. ambulated 75' w/IV pole, contact guard assist. Gait limited by fatigue, SOA, weakness. O2 desat to 84% w/ambulation. Recommend trialing rollator vs. rolling walker for energy conservation in subsequent visits. Recommend inpatient rehab upon discharge.

## 2021-10-08 NOTE — PROGRESS NOTES
[facility] Medicine Services  PROGRESS NOTE    Patient Name: Pilar Colon  : 1945  MRN: [id number]    Date of Admission: 10/7/2021  Primary Care Physician: Omer Diaz MD    Subjective   Subjective     CC: f/u COPD exacerbation    HPI: Up in bed. Says she feels a little better at rest but has not been OOB yet. Doesn't feel up to leaving today.    ROS:  Gen- No fevers, chills  CV- No chest pain, palpitations  Resp- No cough, dyspnea  GI- No N/V/D, abd pain     Objective   Objective     Vital Signs:   Temp:  [97.5 °F (36.4 °C)-99.7 °F (37.6 °C)] 97.5 °F (36.4 °C)  Heart Rate:  [] 88  Resp:  [14-38] 18  BP: (112-177)/() 149/87  Flow (L/min):  [2-6] 2     Physical Exam:  Constitutional: No acute distress, awake, alert  HENT: NCAT, mucous membranes moist  Respiratory: Clear to auscultation bilaterally, respiratory effort normal   Cardiovascular: RRR, no murmurs, rubs, or gallops  Gastrointestinal: Positive bowel sounds, soft, nontender, nondistended  Musculoskeletal: No bilateral ankle edema  Psychiatric: Appropriate affect, cooperative  Neurologic: Oriented x 3, strength symmetric in all extremities, Cranial Nerves grossly intact to confrontation, speech clear  Skin: No rashes    Results Reviewed:  LAB RESULTS:      Lab 10/08/21  0658 10/07/21  1323   WBC 5.85 13.94*   HEMOGLOBIN 10.8* 11.5*   HEMATOCRIT 32.5* 35.8   PLATELETS 274 344   NEUTROS ABS 4.76 12.32*   IMMATURE GRANS (ABS) 0.05 0.10*   LYMPHS ABS 0.75 0.93   MONOS ABS 0.29 0.57   EOS ABS 0.00 0.01   MCV 87.1 88.8   PROCALCITONIN  --  0.05         Lab 10/08/21  0658 10/07/21  1323   SODIUM 140 144   POTASSIUM 3.6 3.9   CHLORIDE 101 105   CO2 30.0* 26.0   ANION GAP 9.0 13.0   BUN 26* 23   CREATININE 0.84 0.94   GLUCOSE 154* 131*   CALCIUM 8.7 9.4   MAGNESIUM  --  2.2         Lab 10/07/21  1323   TOTAL PROTEIN 7.4   ALBUMIN 4.60   GLOBULIN 2.8   ALT (SGPT) 19   AST (SGOT) 36*   BILIRUBIN 0.3   ALK PHOS 61          Lab 10/07/21  1323   PROBNP 4,884.0*   TROPONIN T 0.012                 Lab 10/07/21  2216   PH, ARTERIAL 7.438   PCO2, ARTERIAL 37.5   PO2 .0*   FIO2 100   HCO3 ART 25.3   BASE EXCESS ART 1.2   CARBOXYHEMOGLOBIN 0.3     Brief Urine Lab Results     None          Microbiology Results Abnormal     Procedure Component Value - Date/Time    COVID PRE-OP / PRE-PROCEDURE SCREENING ORDER (NO ISOLATION) - Swab, Nasopharynx [[id number]]  (Normal) Collected: 10/07/21 1518    Lab Status: Final result Specimen: Swab from Nasopharynx Updated: 10/07/21 1552    Narrative:      The following orders were created for panel order COVID PRE-OP / PRE-PROCEDURE SCREENING ORDER (NO ISOLATION) - Swab, Nasopharynx.  Procedure                               Abnormality         Status                     ---------                               -----------         ------                     COVID-19 and FLU A/B PCR...[[id number]]  Normal              Final result                 Please view results for these tests on the individual orders.    COVID-19 and FLU A/B PCR - Swab, Nasopharynx [[id number]]  (Normal) Collected: 10/07/21 1518    Lab Status: Final result Specimen: Swab from Nasopharynx Updated: 10/07/21 1552     COVID19 Not Detected     Influenza A PCR Not Detected     Influenza B PCR Not Detected    Narrative:      Fact sheet for providers: https://www.fda.gov/media/887994/download    Fact sheet for patients: https://www.fda.gov/media/849542/download    Test performed by PCR.        CXR personally reviewed without acute disease. Agree with interpretation.    XR Chest 1 View    Result Date: 10/7/2021  EXAMINATION: XR CHEST 1 VW-10/07/2021:  INDICATION: SOA, triage protocol.  COMPARISON: NONE.  FINDINGS: AP view of the chest reveals cardiac size borderline enlarged without overt edema. Chronic hyperinflated appearance of obstructive pulmonary disease without pneumothorax or pleural effusion.      Impression: Findings of COPD without  focal consolidation. Potential prominence of perihilar lung markings may represent bronchitis without effusion.  D:  10/07/2021 E:  10/07/2021  This report was finalized on 10/7/2021 5:00 PM by Dr. Bijan Willams.            I have reviewed the medications:  Scheduled Meds:albuterol, , ,   aspirin, 325 mg, Oral, Daily  budesonide-formoterol, 2 puff, Inhalation, BID - RT  busPIRone, 10 mg, Oral, Daily  dextromethorphan polistirex ER, 60 mg, Oral, Q12H  dilTIAZem CD, 240 mg, Oral, Q24H  doxycycline, 100 mg, Intravenous, Q12H  gabapentin, 300 mg, Oral, Q8H  guaiFENesin, 1,200 mg, Oral, Q12H  heparin (porcine), 5,000 Units, Subcutaneous, Q12H  ipratropium-albuterol, 3 mL, Nebulization, Q4H - RT  losartan, 50 mg, Oral, Daily  meclizine, 25 mg, Oral, BID  methylPREDNISolone sodium succinate, 60 mg, Intravenous, Q8H  pantoprazole, 40 mg, Oral, Daily  PARoxetine, 40 mg, Oral, Daily  pharmacy consult - MTM, , Does not apply, Daily  pravastatin, 80 mg, Oral, Nightly  sodium chloride, 10 mL, Intravenous, Q12H  zolpidem, 5 mg, Oral, Nightly      Continuous Infusions:   PRN Meds:.•  acetaminophen **OR** acetaminophen **OR** acetaminophen  •  benzonatate  •  senna-docusate sodium **AND** polyethylene glycol **AND** bisacodyl **AND** bisacodyl  •  HYDROcodone-acetaminophen  •  ipratropium  •  ondansetron **OR** ondansetron  •  sodium chloride    Assessment/Plan   Assessment & Plan     Active Hospital Problems    Diagnosis  POA   • COPD with acute exacerbation (HCC) [J44.1]  Unknown   • Anxiety disorder [F41.9]  Yes   • Depression [F32.A]  Yes   • HLD (hyperlipidemia) [E78.5]  Yes   • HTN (hypertension) [I10]  Yes   • Hypoxia [R09.02]  Yes   • COPD exacerbation (HCC) [J44.1]  Yes      Resolved Hospital Problems   No resolved problems to display.        Brief Hospital Course to date:  Pilar Colon is a 76 y.o. female lifelong smoker (quit a week ago) with COPD, CAD, HTN HL here with COPD exacerbation. This is my first day assessing  "patient's active medical issues.    COPD with exac  Hypoxia   -- Still wheezy and dyspneic on exertion. COntinue nebs q 4 hours and prn, continue steroids.  -- Continue doxycycline bid for 7 days   -- Continue symbicort bid  -- COPD navigator consult   -- Continue mucinex bid, add delsym as paroxysmal coughing causes dyspnea; prn tessalon   -- Needs home O2, d/w CM.     Leukocytosis   -- Suspect due to steroid use. No evidence active infection. CTM.     Anxiety  Depression  -- cont buspar and paxil      CAD  HLD  HTN  -- Controlled, cont statin, ASA, cozaar and cardizem      Chronic back pain  -- Cont norco and decreased Neurontin dose based on CrCl    Tobacco Use  -- NRT offered. Patient refused as she \"has quite.\"    This patient's problems and plans were partially entered by my partner and updated as appropriate by me 10/08/21.    DVT prophylaxis:  Medical DVT prophylaxis orders are present.       AM-PAC 6 Clicks Score (PT): 16 (10/08/21 5923)    Disposition: I expect the patient to be discharged home in 1-2 days.    CODE STATUS:   Code Status and Medical Interventions:   Ordered at: 10/08/21 3342     Code Status:    CPR     Medical Interventions (Level of Support Prior to Arrest):    Full       Jil Silva II, DO  10/08/21            "

## 2021-10-08 NOTE — SIGNIFICANT NOTE
Patient became tachycardic around 2140, upon assessment WOB had increased significantly from prior assessment. RR 30-32, o2 sat 90-92 on 6L nasal cannula, pt complained of SOA/anxiety/discomfort. HR up to 130's with witnessed bigeminy. BP stable. Pt denied any pain. Lung sounds course, wheezes expiratory and inspiratory. Nebs had already been given along with steroids. APRN stat paged. RRT called to bedside. RR increased to 38-40. EKG obtained, NSR with frequent PVCs. Albuterol administered, ABGs obtained, Bipap initiated. 1g Mag administered. Lasix administered. RR down to 25 with o2 sat of % on Bipap. Pt reported relief of SOA and anxiety had decreased. WOB improved. HR WDL. VSS. Pt denies pain. Resting in bed. Denies any further needs at this time.

## 2021-10-08 NOTE — CASE MANAGEMENT/SOCIAL WORK
Continued Stay Note  [facility]     Patient Name: Pilar Colon  MRN: [id number]  Today's Date: 10/8/2021    Admit Date: 10/7/2021    Discharge Plan     Row Name 10/08/21 1007       Plan    Plan  Home    Patient/Family in Agreement with Plan  yes    Plan Comments  Patient's goal remains to return home at discharge.  Will await therapy recommendations to determine proper discharge palcement.  CM will continue to follow.  Zoila Kwok RN [phone]    Final Discharge Disposition Code  01 - home or self-care        Discharge Codes    No documentation.       Expected Discharge Date and Time     Expected Discharge Date Expected Discharge Time    Oct 11, 2021             Zoila Kwok RN

## 2021-10-08 NOTE — THERAPY TREATMENT NOTE
"Patient Name: Pilar Colon  : 1945    MRN: [id number]                              Today's Date: 10/8/2021       Admit Date: 10/7/2021    Visit Dx:     ICD-10-CM ICD-9-CM   1. COPD exacerbation (HCC)  J44.1 491.21   2. Hypoxia  R09.02 799.02     Patient Active Problem List   Diagnosis   • COPD with acute exacerbation (HCC)   • Anxiety disorder   • Depression   • HLD (hyperlipidemia)   • HTN (hypertension)   • Hypoxia   • COPD exacerbation (HCC)     Past Medical History:   Diagnosis Date   • Anemia    • Anxiety    • Arthritis    • Bronchitis    • COPD (chronic obstructive pulmonary disease) (HCC)    • Depression    • Diverticulitis    • Emphysema (subcutaneous) (surgical) resulting from a procedure    • History of left heart catheterization     STENT PLACED   • Hyperlipidemia    • Hypertension    • Low back pain    • Osteoporosis    • Pneumonia      Past Surgical History:   Procedure Laterality Date   • BREAST BIOPSY Right     [place], Dr. Severo Kay   • HAND SURGERY Left     Arthritis surgery, [place], [place]   • SHOULDER SURGERY Bilateral     [place]. Dr. Dixon   • TUBAL ABDOMINAL LIGATION       General Information     Row Name 10/08/21 1545          Physical Therapy Time and Intention    Document Type  evaluation  -SS     Mode of Treatment  physical therapy  -SS     Row Name 10/08/21 1545          General Information    Patient Profile Reviewed  yes  -SS     Prior Level of Function  independent:;all household mobility;gait;transfer;bed mobility;using stairs pending day/SOA: able to ambulate in community; no AD but recently decline in distance ambulated prior to SOA/\"flare up\"  -SS     Existing Precautions/Restrictions  fall;oxygen therapy device and L/min  -SS     Barriers to Rehab  medically complex;previous functional deficit  -SS     Row Name 10/08/21 1545          Living Environment    Lives With  child(annel), adult  -SS     Row Name 10/08/21 1545          Home Main Entrance    Number of Stairs, " Main Entrance  one  -SS     Stair Railings, Main Entrance  none  -SS     Row Name 10/08/21 1545          Stairs Within Home, Primary    Number of Stairs, Within Home, Primary  none  -SS     Row Name 10/08/21 1545          Cognition    Orientation Status (Cognition)  oriented x 3  -SS     Row Name 10/08/21 1545          Safety Issues, Functional Mobility    Safety Issues Affecting Function (Mobility)  awareness of need for assistance;insight into deficits/self-awareness;judgment;problem-solving;safety precaution awareness;safety precautions follow-through/compliance;sequencing abilities  -     Impairments Affecting Function (Mobility)  balance;endurance/activity tolerance;postural/trunk control;shortness of breath;strength  -       User Key  (r) = Recorded By, (t) = Taken By, (c) = Cosigned By    Initials Name Provider Type     Ruth Garibay, PT Physical Therapist        Mobility     Row Name 10/08/21 1549          Bed Mobility    Comment (Bed Mobility)  pt. up in chair  -     Row Name 10/08/21 1549          Transfers    Comment (Transfers)  VC for hand placement, lowering with eccentric control  -     Row Name 10/08/21 1549          Sit-Stand Transfer    Sit-Stand [place] (Transfers)  contact guard;verbal cues  -     Assistive Device (Sit-Stand Transfers)  walker, front-wheeled  -     Row Name 10/08/21 1549          Gait/Stairs (Locomotion)    [place] Level (Gait)  contact guard  -     Assistive Device (Gait)  other (see comments) IV pole  -     Distance in Feet (Gait)  75  -     Deviations/Abnormal Patterns (Gait)  bilateral deviations;elliott decreased;gait speed decreased;stride length decreased  -     Bilateral Gait Deviations  forward flexed posture;heel strike decreased  -     Comment (Gait/Stairs)  Pt. ambulated with a step through gait pattern at a decreased speed. VC for safety recommendations, controlled breathing. Gait limited by SOA, fatigue, weakness.  -       User  Key  (r) = Recorded By, (t) = Taken By, (c) = Cosigned By    Initials Name Provider Type     Ruth Garibay, NOLAN Physical Therapist        Obj/Interventions     Row Name 10/08/21 1550          Range of Motion Comprehensive    General Range of Motion  bilateral lower extremity ROM WFL  -     Row Name 10/08/21 1550          Strength Comprehensive (MMT)    Comment, General Manual Muscle Testing (MMT) Assessment  BLE gross 4-/5  -     Row Name 10/08/21 1550          Motor Skills    Motor Skills  functional endurance  -     Functional Endurance  moderate impairment  -     Row Name 10/08/21 1550          Balance    Balance Assessment  sitting static balance;sitting dynamic balance;standing static balance;standing dynamic balance  -     Static Sitting Balance  WFL;sitting in chair;unsupported  -     Dynamic Sitting Balance  WFL;unsupported;sitting in chair  -     Static Standing Balance  WFL;supported  -     Dynamic Standing Balance  mild impairment;supported  -     Balance Interventions  sitting;standing;sit to stand;supported;static;dynamic;minimal challenge  -     Row Name 10/08/21 1550          Sensory Assessment (Somatosensory)    Sensory Assessment (Somatosensory)  LE sensation intact  -       User Key  (r) = Recorded By, (t) = Taken By, (c) = Cosigned By    Initials Name Provider Type     Ruth Garibay PT Physical Therapist        Goals/Plan     Row Name 10/08/21 1556          Bed Mobility Goal 1 (PT)    Activity/Assistive Device (Bed Mobility Goal 1, PT)  bed mobility activities, all  -     [place] Level/Cues Needed (Bed Mobility Goal 1, PT)  independent  -     Time Frame (Bed Mobility Goal 1, PT)  long term goal (LTG);10 days  -     Row Name 10/08/21 1556          Transfer Goal 1 (PT)    Activity/Assistive Device (Transfer Goal 1, PT)  transfers, all;sit-to-stand/stand-to-sit;bed-to-chair/chair-to-bed  -SS     [place] Level/Cues Needed (Transfer Goal 1, PT)  independent   -     Time Frame (Transfer Goal 1, PT)  long term goal (LTG);10 days  -     Row Name 10/08/21 1556          Gait Training Goal 1 (PT)    Activity/Assistive Device (Gait Training Goal 1, PT)  gait (walking locomotion);assistive device use;walker, rolling  -     [place] Level (Gait Training Goal 1, PT)  modified independence  -     Distance (Gait Training Goal 1, PT)  150  -SS       User Key  (r) = Recorded By, (t) = Taken By, (c) = Cosigned By    Initials Name Provider Type     Ruth Garibay, PT Physical Therapist        Clinical Impression     Row Name 10/08/21 1553          Pain    Additional Documentation  Pain Scale: Numbers Pre/Post-Treatment (Group)  -     Row Name 10/08/21 2303          Pain Scale: Numbers Pre/Post-Treatment    Pretreatment Pain Rating  0/10 - no pain  -     Posttreatment Pain Rating  0/10 - no pain  -     Pain Intervention(s)  Ambulation/increased activity;Repositioned  -     Row Name 10/08/21 2363          Plan of Care Review    Plan of Care Reviewed With  patient;grandchild(annel)  -     Outcome Summary  PT eval complete. Pt. performed sit to stand transfer w/contact guard assist. Pt. ambulated 75' w/IV pole, contact guard assist. Gait limited by fatigue, SOA, weakness. O2 desat to 84% w/ambulation. Recommend trialing rollator vs. rolling walker for energy conservation in subsequent visits. Recommend inpatient rehab upon discharge.  -     Row Name 10/08/21 5189          Therapy Assessment/Plan (PT)    Rehab Potential (PT)  good, to achieve stated therapy goals  -     Criteria for Skilled Interventions Met (PT)  yes;meets criteria;skilled treatment is necessary  -     Row Name 10/08/21 1157          Vital Signs    Pre Systolic BP Rehab  136  -SS     Pre Treatment Diastolic BP  71  -SS     Post Systolic BP Rehab  142  -SS     Post Treatment Diastolic BP  67  -SS     Pretreatment Heart Rate (beats/min)  77  -SS     Posttreatment Heart Rate (beats/min)  79  -SS      Pre SpO2 (%)  93  -SS     O2 Delivery Pre Treatment  room air  -SS     Intra SpO2 (%)  84  -SS     O2 Delivery Intra Treatment  room air  -SS     Post SpO2 (%)  92  -SS     O2 Delivery Post Treatment  room air  -SS     Pre Patient Position  Sitting  -SS     Intra Patient Position  Standing  -SS     Post Patient Position  Sitting  -SS     Row Name 10/08/21 1553          Positioning and Restraints    Pre-Treatment Position  sitting in chair/recliner  -SS     Post Treatment Position  chair  -SS     In Chair  notified nsg;reclined;call light within reach;encouraged to call for assist;exit alarm on;with family/caregiver;legs elevated  -SS       User Key  (r) = Recorded By, (t) = Taken By, (c) = Cosigned By    Initials Name Provider Type    Ruth Pan PT Physical Therapist        Outcome Measures     Row Name 10/08/21 1557 10/08/21 0757       How much help from another person do you currently need...    Turning from your back to your side while in flat bed without using bedrails?  3  -SS  4  -LC    Moving from lying on back to sitting on the side of a flat bed without bedrails?  3  -SS  3  -LC    Moving to and from a bed to a chair (including a wheelchair)?  3  -SS  3  -LC    Standing up from a chair using your arms (e.g., wheelchair, bedside chair)?  3  -SS  2  -LC    Climbing 3-5 steps with a railing?  2  -SS  2  -LC    To walk in hospital room?  3  -SS  2  -LC    AM-PAC 6 Clicks Score (PT)  17  -SS  16  -LC    Row Name 10/08/21 1557          Functional Assessment    Outcome Measure Options  AM-PAC 6 Clicks Basic Mobility (PT)  -       User Key  (r) = Recorded By, (t) = Taken By, (c) = Cosigned By    Initials Name Provider Type    Avis Tan RN Registered Nurse    Ruth Pan PT Physical Therapist                       Physical Therapy Education                 Title: PT OT SLP Therapies (In Progress)     Topic: Physical Therapy (In Progress)     Point: Mobility training (Done)     Learning  Progress Summary           Patient Acceptance, E, VU,NR by  at 10/8/2021 1557    Comment: Educated pt. safety/technique with transfers, ambulation, benefits of rollator, benefits of acute rehab, PT POC   Family Acceptance, E, VU,NR by SS at 10/8/2021 1557    Comment: Educated pt. safety/technique with transfers, ambulation, benefits of rollator, benefits of acute rehab, PT POC                   Point: Home exercise program (Not Started)     Learner Progress:  Not documented in this visit.          Point: Body mechanics (Done)     Learning Progress Summary           Patient Acceptance, E, VU,NR by SS at 10/8/2021 1557    Comment: Educated pt. safety/technique with transfers, ambulation, benefits of rollator, benefits of acute rehab, PT POC   Family Acceptance, E, VU,NR by SS at 10/8/2021 1557    Comment: Educated pt. safety/technique with transfers, ambulation, benefits of rollator, benefits of acute rehab, PT POC                   Point: Precautions (Done)     Learning Progress Summary           Patient Acceptance, E, VU,NR by  at 10/8/2021 1557    Comment: Educated pt. safety/technique with transfers, ambulation, benefits of rollator, benefits of acute rehab, PT POC   Family Acceptance, E, VU,NR by  at 10/8/2021 1557    Comment: Educated pt. safety/technique with transfers, ambulation, benefits of rollator, benefits of acute rehab, PT POC                               User Key     Initials Effective Dates Name Provider Type Discipline     06/01/21 -  Ruth Garibay, PT Physical Therapist PT              PT Recommendation and Plan  Planned Therapy Interventions (PT): balance training, bed mobility training, gait training, home exercise program, neuromuscular re-education, patient/family education, strengthening, transfer training  Plan of Care Reviewed With: patient, grandchild(annel)  Outcome Summary: PT eval complete. Pt. performed sit to stand transfer w/contact guard assist. Pt. ambulated 75' w/IV pole,  contact guard assist. Gait limited by fatigue, SOA, weakness. O2 desat to 84% w/ambulation. Recommend trialing rollator vs. rolling walker for energy conservation in subsequent visits. Recommend inpatient rehab upon discharge.     Time Calculation:   PT Charges     Row Name 10/08/21 1558             Time Calculation    Start Time  1434  -SS      Stop Time  1450  -SS      Time Calculation (min)  16 min  -SS      PT Received On  10/08/21  -SS      PT Goal Re-Cert Due Date  10/18/21  -SS         Timed Charges    35223 - PT Therapeutic Activity Minutes  8  -SS         Untimed Charges    PT Eval/Re-eval Minutes  35  -SS         Total Minutes    Timed Charges Total Minutes  8  -SS      Untimed Charges Total Minutes  35  -SS       Total Minutes  43  -SS        User Key  (r) = Recorded By, (t) = Taken By, (c) = Cosigned By    Initials Name Provider Type    SS Ruth Garibay, PT Physical Therapist        Therapy Charges for Today     Code Description Service Date Service Provider Modifiers Qty    12282527682 HC PT THERAPEUTIC ACT EA 15 MIN 10/8/2021 Ruth Garibay, PT GP 1    74528307711 HC PT EVAL MOD COMPLEXITY 3 10/8/2021 Ruth Garibay, PT GP 1          PT G-Codes  Outcome Measure Options: AM-PAC 6 Clicks Basic Mobility (PT)  AM-PAC 6 Clicks Score (PT): 17    Ruth Garibay PT  10/8/2021

## 2021-10-08 NOTE — PLAN OF CARE
Alert and oriented.  Monitor SR.  SOA with pursed lip breathing, broken speech, retractions, and accessory muscle use this morning.  Bipap removed for breakfast and meds.  Sats remained stable.  Retractions improved, but SOA still with activity and conversation.  Weaned off nasal cannula.  Room air sats remained > 90%, but decreased to 84% when working with therapy.  Oxygen tank delivered for home use.  Purewick in place.  PRN Norco and Pericolace once.  Up to recliner 1 assist.  Family bedside for visit.

## 2021-10-09 PROCEDURE — 94799 UNLISTED PULMONARY SVC/PX: CPT

## 2021-10-09 PROCEDURE — 25010000002 METHYLPREDNISOLONE PER 125 MG: Performed by: INTERNAL MEDICINE

## 2021-10-09 PROCEDURE — 25010000002 HEPARIN (PORCINE) PER 1000 UNITS: Performed by: NURSE PRACTITIONER

## 2021-10-09 PROCEDURE — 99232 SBSQ HOSP IP/OBS MODERATE 35: CPT | Performed by: INTERNAL MEDICINE

## 2021-10-09 PROCEDURE — 25010000002 METHYLPREDNISOLONE PER 125 MG: Performed by: NURSE PRACTITIONER

## 2021-10-09 PROCEDURE — 92610 EVALUATE SWALLOWING FUNCTION: CPT

## 2021-10-09 RX ORDER — POTASSIUM CHLORIDE 750 MG/1
40 CAPSULE, EXTENDED RELEASE ORAL AS NEEDED
Status: DISCONTINUED | OUTPATIENT
Start: 2021-10-09 | End: 2021-10-12 | Stop reason: HOSPADM

## 2021-10-09 RX ORDER — POTASSIUM CHLORIDE 7.45 MG/ML
10 INJECTION INTRAVENOUS
Status: DISCONTINUED | OUTPATIENT
Start: 2021-10-09 | End: 2021-10-12 | Stop reason: HOSPADM

## 2021-10-09 RX ORDER — METHYLPREDNISOLONE SODIUM SUCCINATE 125 MG/2ML
60 INJECTION, POWDER, LYOPHILIZED, FOR SOLUTION INTRAMUSCULAR; INTRAVENOUS EVERY 12 HOURS
Status: DISCONTINUED | OUTPATIENT
Start: 2021-10-09 | End: 2021-10-12 | Stop reason: HOSPADM

## 2021-10-09 RX ORDER — POTASSIUM CHLORIDE 1.5 G/1.77G
40 POWDER, FOR SOLUTION ORAL AS NEEDED
Status: DISCONTINUED | OUTPATIENT
Start: 2021-10-09 | End: 2021-10-12 | Stop reason: HOSPADM

## 2021-10-09 RX ORDER — LORAZEPAM 0.5 MG/1
0.5 TABLET ORAL EVERY 6 HOURS PRN
Status: DISCONTINUED | OUTPATIENT
Start: 2021-10-09 | End: 2021-10-12 | Stop reason: HOSPADM

## 2021-10-09 RX ADMIN — GUAIFENESIN 1200 MG: 600 TABLET, EXTENDED RELEASE ORAL at 21:02

## 2021-10-09 RX ADMIN — BENZONATATE 200 MG: 100 CAPSULE ORAL at 09:15

## 2021-10-09 RX ADMIN — HEPARIN SODIUM 5000 UNITS: 5000 INJECTION, SOLUTION INTRAVENOUS; SUBCUTANEOUS at 09:08

## 2021-10-09 RX ADMIN — PANTOPRAZOLE SODIUM 40 MG: 40 TABLET, DELAYED RELEASE ORAL at 09:10

## 2021-10-09 RX ADMIN — IPRATROPIUM BROMIDE AND ALBUTEROL SULFATE 3 ML: 2.5; .5 SOLUTION RESPIRATORY (INHALATION) at 16:02

## 2021-10-09 RX ADMIN — LOSARTAN POTASSIUM 50 MG: 50 TABLET, FILM COATED ORAL at 09:46

## 2021-10-09 RX ADMIN — POTASSIUM CHLORIDE 40 MEQ: 750 CAPSULE, EXTENDED RELEASE ORAL at 18:03

## 2021-10-09 RX ADMIN — GABAPENTIN 300 MG: 300 CAPSULE ORAL at 06:13

## 2021-10-09 RX ADMIN — PAROXETINE HYDROCHLORIDE 40 MG: 20 TABLET, FILM COATED ORAL at 09:10

## 2021-10-09 RX ADMIN — Medication 60 MG: at 21:03

## 2021-10-09 RX ADMIN — Medication 60 MG: at 09:15

## 2021-10-09 RX ADMIN — LORAZEPAM 0.5 MG: 0.5 TABLET ORAL at 18:03

## 2021-10-09 RX ADMIN — GABAPENTIN 300 MG: 300 CAPSULE ORAL at 21:02

## 2021-10-09 RX ADMIN — HYDROCODONE BITARTRATE AND ACETAMINOPHEN 1 TABLET: 10; 325 TABLET ORAL at 09:10

## 2021-10-09 RX ADMIN — IPRATROPIUM BROMIDE AND ALBUTEROL SULFATE 3 ML: 2.5; .5 SOLUTION RESPIRATORY (INHALATION) at 19:52

## 2021-10-09 RX ADMIN — MECLIZINE 25 MG: 12.5 TABLET ORAL at 21:03

## 2021-10-09 RX ADMIN — POTASSIUM CHLORIDE 40 MEQ: 750 CAPSULE, EXTENDED RELEASE ORAL at 15:01

## 2021-10-09 RX ADMIN — IPRATROPIUM BROMIDE AND ALBUTEROL SULFATE 3 ML: 2.5; .5 SOLUTION RESPIRATORY (INHALATION) at 08:20

## 2021-10-09 RX ADMIN — HYDROCODONE BITARTRATE AND ACETAMINOPHEN 1 TABLET: 10; 325 TABLET ORAL at 15:01

## 2021-10-09 RX ADMIN — BENZONATATE 200 MG: 100 CAPSULE ORAL at 18:03

## 2021-10-09 RX ADMIN — BUDESONIDE AND FORMOTEROL FUMARATE DIHYDRATE 2 PUFF: 80; 4.5 AEROSOL RESPIRATORY (INHALATION) at 19:52

## 2021-10-09 RX ADMIN — DOXYCYCLINE 100 MG: 100 INJECTION, POWDER, LYOPHILIZED, FOR SOLUTION INTRAVENOUS at 06:13

## 2021-10-09 RX ADMIN — SODIUM CHLORIDE, PRESERVATIVE FREE 10 ML: 5 INJECTION INTRAVENOUS at 21:03

## 2021-10-09 RX ADMIN — DOCUSATE SODIUM 50 MG AND SENNOSIDES 8.6 MG 2 TABLET: 8.6; 5 TABLET, FILM COATED ORAL at 09:15

## 2021-10-09 RX ADMIN — GABAPENTIN 300 MG: 300 CAPSULE ORAL at 15:01

## 2021-10-09 RX ADMIN — IPRATROPIUM BROMIDE AND ALBUTEROL SULFATE 3 ML: 2.5; .5 SOLUTION RESPIRATORY (INHALATION) at 12:12

## 2021-10-09 RX ADMIN — POLYETHYLENE GLYCOL 3350 17 G: 17 POWDER, FOR SOLUTION ORAL at 09:15

## 2021-10-09 RX ADMIN — GUAIFENESIN 1200 MG: 600 TABLET, EXTENDED RELEASE ORAL at 09:10

## 2021-10-09 RX ADMIN — MECLIZINE 25 MG: 12.5 TABLET ORAL at 09:10

## 2021-10-09 RX ADMIN — HEPARIN SODIUM 5000 UNITS: 5000 INJECTION, SOLUTION INTRAVENOUS; SUBCUTANEOUS at 21:03

## 2021-10-09 RX ADMIN — BUDESONIDE AND FORMOTEROL FUMARATE DIHYDRATE 2 PUFF: 80; 4.5 AEROSOL RESPIRATORY (INHALATION) at 08:20

## 2021-10-09 RX ADMIN — PRAVASTATIN SODIUM 80 MG: 40 TABLET ORAL at 21:03

## 2021-10-09 RX ADMIN — DILTIAZEM HYDROCHLORIDE 240 MG: 240 CAPSULE, COATED, EXTENDED RELEASE ORAL at 09:10

## 2021-10-09 RX ADMIN — SODIUM CHLORIDE, PRESERVATIVE FREE 10 ML: 5 INJECTION INTRAVENOUS at 09:14

## 2021-10-09 RX ADMIN — BUSPIRONE HYDROCHLORIDE 10 MG: 10 TABLET ORAL at 09:10

## 2021-10-09 RX ADMIN — LORAZEPAM 0.5 MG: 0.5 TABLET ORAL at 12:15

## 2021-10-09 RX ADMIN — DOXYCYCLINE 100 MG: 100 INJECTION, POWDER, LYOPHILIZED, FOR SOLUTION INTRAVENOUS at 18:04

## 2021-10-09 RX ADMIN — METHYLPREDNISOLONE SODIUM SUCCINATE 60 MG: 125 INJECTION, POWDER, FOR SOLUTION INTRAMUSCULAR; INTRAVENOUS at 18:04

## 2021-10-09 RX ADMIN — METHYLPREDNISOLONE SODIUM SUCCINATE 60 MG: 125 INJECTION, POWDER, FOR SOLUTION INTRAMUSCULAR; INTRAVENOUS at 06:19

## 2021-10-09 RX ADMIN — ZOLPIDEM TARTRATE 5 MG: 5 TABLET ORAL at 21:03

## 2021-10-09 RX ADMIN — ASPIRIN 325 MG ORAL TABLET 325 MG: 325 PILL ORAL at 09:10

## 2021-10-09 NOTE — PLAN OF CARE
Goal Outcome Evaluation:  Plan of Care Reviewed With: patient     SLP evaluation completed. Will continue to address diet tolerance. Please see note for further details and recommendations.

## 2021-10-09 NOTE — THERAPY EVALUATION
Acute Care - Speech Language Pathology   Swallow Initial Evaluation [facility]   Clinical Swallow Evaluation       Patient Name: Pilar Colon  : 1945  MRN: [id number]  Today's Date: 10/9/2021               Admit Date: 10/7/2021    Visit Dx:     ICD-10-CM ICD-9-CM   1. COPD exacerbation (HCC)  J44.1 491.21   2. Hypoxia  R09.02 799.02     Patient Active Problem List   Diagnosis   • COPD with acute exacerbation (HCC)   • Anxiety disorder   • Depression   • HLD (hyperlipidemia)   • HTN (hypertension)   • Hypoxia   • COPD exacerbation (HCC)     Past Medical History:   Diagnosis Date   • Anemia    • Anxiety    • Arthritis    • Bronchitis    • COPD (chronic obstructive pulmonary disease) (HCC)    • Depression    • Diverticulitis    • Emphysema (subcutaneous) (surgical) resulting from a procedure    • History of left heart catheterization     STENT PLACED   • Hyperlipidemia    • Hypertension    • Low back pain    • Osteoporosis    • Pneumonia      Past Surgical History:   Procedure Laterality Date   • BREAST BIOPSY Right     [place], Dr. Severo Kay   • HAND SURGERY Left     Arthritis surgery, [place], [place]   • SHOULDER SURGERY Bilateral     [place]. Dr. Dixon   • TUBAL ABDOMINAL LIGATION         SLP Recommendation and Plan  SLP Swallowing Diagnosis: functional oral phase, functional pharyngeal phase (10/09/21 1415)  SLP Diet Recommendation: soft textures, thin liquids (10/09/21 1415)  Recommended Precautions and Strategies: upright posture during/after eating, small bites of food and sips of liquid, general aspiration precautions, fatigue precautions (10/09/21 1415)  SLP Rec. for Method of Medication Administration: meds whole, with pudding or applesauce (10/09/21 1415)     Monitor for Signs of Aspiration: yes, notify SLP if any concerns (10/09/21 1415)     Swallow Criteria for Skilled Therapeutic Interventions Met: demonstrates skilled criteria (10/09/21 1415)  Anticipated Discharge Disposition (SLP):  unknown, anticipate therapy at next level of care (10/09/21 1415)  Rehab Potential/Prognosis, Swallowing: good, to achieve stated therapy goals (10/09/21 1415)  Therapy Frequency (Swallow): 3 days per week (10/09/21 1415)  Predicted Duration Therapy Intervention (Days): until discharge (10/09/21 1415)                         Plan of Care Reviewed With: patient      SWALLOW EVALUATION (last 72 hours)     SLP Adult Swallow Evaluation     Row Name 10/09/21 1415                   Rehab Evaluation    Document Type evaluation  -        Subjective Information no complaints  -        Patient Observations alert; cooperative; agree to therapy  -        Patient/Family/Caregiver Comments/Observations Son present  -        Care Plan Review evaluation/treatment results reviewed; care plan/treatment goals reviewed; risks/benefits reviewed; current/potential barriers reviewed; patient/other agree to care plan  -        Care Plan Review, Other Participant(s) son  -        Patient Effort good  -                  General Information    Patient Profile Reviewed yes  -KL        Pertinent History Of Current Problem Pt with PMH arthritis, emphysema, HLD, HTN, depression, anxiety, CAD w/ stent, and chronic low back pain. Pt adm with SOA, COPD exac, and hypoxia.  -KL        Current Method of Nutrition regular textures; thin liquids  -        Precautions/Limitations, Vision WFL; for purposes of eval  -KL        Precautions/Limitations, Hearing WFL; for purposes of eval  -        Prior Level of Function-Communication WFL  -        Prior Level of Function-Swallowing no diet consistency restrictions  -        Plans/Goals Discussed with patient; family; agreed upon  -        Barriers to Rehab none identified  -        Patient's Goals for Discharge patient did not state  -        Family Goals for Discharge family did not state  -                  Pain    Additional Documentation Pain Scale: FACES Pre/Post-Treatment  (Group)  -KL                  Pain Scale: FACES Pre/Post-Treatment    Pain: FACES Scale, Pretreatment 0-->no hurt  -KL        Posttreatment Pain Rating 0-->no hurt  -KL                  Oral Motor Structure and Function    Dentition Assessment natural, present and adequate  -KL        Secretion Management WNL/WFL  -KL        Mucosal Quality moist, healthy  -KL        Volitional Swallow WFL  -KL        Volitional Cough WFL  -KL                  Oral Musculature and Cranial Nerve Assessment    Oral Motor General Assessment WFL  -KL                  General Eating/Swallowing Observations    Respiratory Support Currently in Use nasal cannula  -        Eating/Swallowing Skills self-fed  -        Positioning During Eating upright 90 degree  -KL        Utensils Used spoon; cup; straw  -KL        Consistencies Trialed regular textures; pureed; thin liquids  -                  Respiratory    Respiratory Status increase in respiratory rate; during swallowing/eating  -                  Clinical Swallow Eval    Oral Prep Phase WFL  -KL        Oral Transit WFL  -KL        Oral Residue WFL  -KL        Pharyngeal Phase no overt signs/symptoms of pharyngeal impairment  -KL        Esophageal Phase unremarkable  -        Clinical Swallow Evaluation Summary Pt with significantly delayed cough x1 after thin via straw. Pt with no immediate overt clinical s/sx of aspiration characterized by no immediate coughing/choking, a clear vocal quality after the swallow, and seemingly prompt pharyngeal swallow and adequate hyolaryngeal elevation upon palpation. Pt with increase of SOB during mastication of solids. Recommend mechanical soft diet with thin liquids. ST to f/u for diet tolerance.  -KL                  Clinical Impression    SLP Swallowing Diagnosis functional oral phase; functional pharyngeal phase  -        Functional Impact risk of aspiration/pneumonia  -        Rehab Potential/Prognosis, Swallowing good, to achieve  stated therapy goals  -        Swallow Criteria for Skilled Therapeutic Interventions Met demonstrates skilled criteria  -                  Recommendations    Therapy Frequency (Swallow) 3 days per week  -        Predicted Duration Therapy Intervention (Days) until discharge  -        SLP Diet Recommendation soft textures; thin liquids  -        Recommended Precautions and Strategies upright posture during/after eating; small bites of food and sips of liquid; general aspiration precautions; fatigue precautions  -        Oral Care Recommendations Oral Care BID/PRN  -        SLP Rec. for Method of Medication Administration meds whole; with pudding or applesauce  -        Monitor for Signs of Aspiration yes; notify SLP if any concerns  -        Anticipated Discharge Disposition (SLP) unknown; anticipate therapy at next level of care  -              User Key  (r) = Recorded By, (t) = Taken By, (c) = Cosigned By    Initials Name Effective Dates    Lida Dumont MS CCC-SLP 06/15/21 -                 EDUCATION  The patient has been educated in the following areas:   Dysphagia (Swallowing Impairment) Oral Care/Hydration.        SLP GOALS     Row Name 10/09/21 1415             Oral Nutrition/Hydration Goal 1 (SLP)    Oral Nutrition/Hydration Goal 1, SLP LTG: Pt will tolerate mechanical soft diet with thin liquids without s/sx of aspiration with 100% accuracy  -      Time Frame (Oral Nutrition/Hydration Goal 1, SLP) by discharge  -              Oral Nutrition/Hydration Goal 2 (SLP)    Oral Nutrition/Hydration Goal 2, SLP LTG: Pt will tolerate mechanical soft diet with thin liquids without s/sx of aspiration with 100% accuracy  -      Time Frame (Oral Nutrition/Hydration Goal 2, SLP) short term goal (STG); by discharge  -            User Key  (r) = Recorded By, (t) = Taken By, (c) = Cosigned By    Initials Name Provider Type    Lida Dumont MS CCC-SLP Speech and Language Pathologist                    Time Calculation:    Time Calculation- SLP     Row Name 10/09/21 1738             Time Calculation- SLP    SLP Start Time 1415  -KL      SLP Received On 10/09/21  -KL              Untimed Charges    SLP Eval/Re-eval  ST Eval Oral Pharyng Swallow - 93842  -KL      [id number] Eval Oral Pharyng Swallow Minutes 60  -KL              Total Minutes    Untimed Charges Total Minutes 60  -KL       Total Minutes 60  -KL            User Key  (r) = Recorded By, (t) = Taken By, (c) = Cosigned By    Initials Name Provider Type    Lida Dumont MS CCC-SLP Speech and Language Pathologist                Therapy Charges for Today     Code Description Service Date Service Provider Modifiers Qty    [id number]  ST EVAL ORAL PHARYNG SWALLOW 4 10/9/2021 Lida Garcia MS CCC-SLP GN 1        Patient was not wearing a face mask and did not exhibit coughing during this therapy encounter.  Procedure performed was not aerosolizing, involved close contact (within 6 feet for at least 15 minutes or longer), and did not involve contact with infectious secretions or specimens.  Therapist used appropriate personal protective equipment including gloves, standard procedure mask and eye protection.  Appropriate PPE was worn during the entire therapy session.  Hand hygiene was completed before and after therapy session.          MS JOSSIE Malcolm  10/9/2021

## 2021-10-09 NOTE — PROGRESS NOTES
[facility] Medicine Services  PROGRESS NOTE    Patient Name: Pilar Colon  : 1945  MRN: [id number]    Date of Admission: 10/7/2021  Primary Care Physician: Omer Diaz MD    Subjective   Subjective     CC: f/u COPD    HPI: Up in bed coughing. Says she feels a little better but is very anxious.    ROS:  Gen- No fevers, chills  CV- No chest pain, palpitations  Resp- No cough, dyspnea  GI- No N/V/D, abd pain    Objective   Objective     Vital Signs:   Temp:  [97.9 °F (36.6 °C)-98.1 °F (36.7 °C)] 97.9 °F (36.6 °C)  Heart Rate:  [69-98] 83  Resp:  [16-24] 22  BP: (121-142)/(63-88) 129/75  Flow (L/min):  [2] 2     Physical Exam:  Constitutional: No acute distress, awake, alert, chronically ill appearing  HENT: NCAT, mucous membranes moist, O2  Respiratory: Bilateral wheezes, respiratory effort normal   Cardiovascular: RRR, no murmurs, rubs, or gallops  Gastrointestinal: Positive bowel sounds, soft, nontender, nondistended  Musculoskeletal: No bilateral ankle edema  Psychiatric: Appropriate affect, cooperative  Neurologic: Oriented x 3, strength symmetric in all extremities, Cranial Nerves grossly intact to confrontation, speech clear  Skin: No rashes    Results Reviewed:  LAB RESULTS:      Lab 10/08/21  0658 10/07/21  1323   WBC 5.85 13.94*   HEMOGLOBIN 10.8* 11.5*   HEMATOCRIT 32.5* 35.8   PLATELETS 274 344   NEUTROS ABS 4.76 12.32*   IMMATURE GRANS (ABS) 0.05 0.10*   LYMPHS ABS 0.75 0.93   MONOS ABS 0.29 0.57   EOS ABS 0.00 0.01   MCV 87.1 88.8   PROCALCITONIN  --  0.05         Lab 10/08/21  0658 10/07/21  1323   SODIUM 140 144   POTASSIUM 3.6 3.9   CHLORIDE 101 105   CO2 30.0* 26.0   ANION GAP 9.0 13.0   BUN 26* 23   CREATININE 0.84 0.94   GLUCOSE 154* 131*   CALCIUM 8.7 9.4   MAGNESIUM  --  2.2         Lab 10/07/21  1323   TOTAL PROTEIN 7.4   ALBUMIN 4.60   GLOBULIN 2.8   ALT (SGPT) 19   AST (SGOT) 36*   BILIRUBIN 0.3   ALK PHOS 61         Lab 10/07/21  1323   PROBNP 4,884.0*    TROPONIN T 0.012                 Lab 10/07/21  2216   PH, ARTERIAL 7.438   PCO2, ARTERIAL 37.5   PO2 .0*   FIO2 100   HCO3 ART 25.3   BASE EXCESS ART 1.2   CARBOXYHEMOGLOBIN 0.3     Brief Urine Lab Results     None          Microbiology Results Abnormal     Procedure Component Value - Date/Time    COVID PRE-OP / PRE-PROCEDURE SCREENING ORDER (NO ISOLATION) - Swab, Nasopharynx [[id number]]  (Normal) Collected: 10/07/21 1518    Lab Status: Final result Specimen: Swab from Nasopharynx Updated: 10/07/21 1552    Narrative:      The following orders were created for panel order COVID PRE-OP / PRE-PROCEDURE SCREENING ORDER (NO ISOLATION) - Swab, Nasopharynx.  Procedure                               Abnormality         Status                     ---------                               -----------         ------                     COVID-19 and FLU A/B PCR...[[id number]]  Normal              Final result                 Please view results for these tests on the individual orders.    COVID-19 and FLU A/B PCR - Swab, Nasopharynx [[id number]]  (Normal) Collected: 10/07/21 1518    Lab Status: Final result Specimen: Swab from Nasopharynx Updated: 10/07/21 1552     COVID19 Not Detected     Influenza A PCR Not Detected     Influenza B PCR Not Detected    Narrative:      Fact sheet for providers: https://www.fda.gov/media/334027/download    Fact sheet for patients: https://www.fda.gov/media/193864/download    Test performed by PCR.           XR Chest 1 View    Result Date: 10/7/2021  EXAMINATION: XR CHEST 1 VW-10/07/2021:  INDICATION: SOA, triage protocol.  COMPARISON: NONE.  FINDINGS: AP view of the chest reveals cardiac size borderline enlarged without overt edema. Chronic hyperinflated appearance of obstructive pulmonary disease without pneumothorax or pleural effusion.      Impression: Findings of COPD without focal consolidation. Potential prominence of perihilar lung markings may represent bronchitis without effusion.   D:  10/07/2021 E:  10/07/2021  This report was finalized on 10/7/2021 5:00 PM by Dr. Bijan Willams.            I have reviewed the medications:  Scheduled Meds:albuterol, , ,   aspirin, 325 mg, Oral, Daily  budesonide-formoterol, 2 puff, Inhalation, BID - RT  busPIRone, 10 mg, Oral, Daily  dextromethorphan polistirex ER, 60 mg, Oral, Q12H  dilTIAZem CD, 240 mg, Oral, Q24H  doxycycline, 100 mg, Intravenous, Q12H  gabapentin, 300 mg, Oral, Q8H  guaiFENesin, 1,200 mg, Oral, Q12H  heparin (porcine), 5,000 Units, Subcutaneous, Q12H  ipratropium-albuterol, 3 mL, Nebulization, Q4H - RT  losartan, 50 mg, Oral, Daily  meclizine, 25 mg, Oral, BID  methylPREDNISolone sodium succinate, 60 mg, Intravenous, Q8H  pantoprazole, 40 mg, Oral, Daily  PARoxetine, 40 mg, Oral, Daily  pharmacy consult - MTM, , Does not apply, Daily  pravastatin, 80 mg, Oral, Nightly  sodium chloride, 10 mL, Intravenous, Q12H  zolpidem, 5 mg, Oral, Nightly      Continuous Infusions:   PRN Meds:.•  acetaminophen **OR** acetaminophen **OR** acetaminophen  •  benzonatate  •  senna-docusate sodium **AND** polyethylene glycol **AND** bisacodyl **AND** bisacodyl  •  HYDROcodone-acetaminophen  •  ipratropium  •  LORazepam  •  ondansetron **OR** ondansetron  •  sodium chloride    Assessment/Plan   Assessment & Plan     Active Hospital Problems    Diagnosis  POA   • COPD with acute exacerbation (HCC) [J44.1]  Unknown   • Anxiety disorder [F41.9]  Yes   • Depression [F32.A]  Yes   • HLD (hyperlipidemia) [E78.5]  Yes   • HTN (hypertension) [I10]  Yes   • Hypoxia [R09.02]  Yes   • COPD exacerbation (HCC) [J44.1]  Yes      Resolved Hospital Problems   No resolved problems to display.        Brief Hospital Course to date:  Pilar Colon is a 76 y.o. female lifelong smoker (quit a week ago) with COPD, CAD, HTN HL here with COPD exacerbation.     COPD with exac  Hypoxia   -- Still wheezy and dyspneic on exertion. COntinue nebs q 4 hours and prn. Taper steroids to BID.  --  "Continue doxycycline bid for 7 days   -- Continue symbicort bid  -- COPD navigator consult   -- Continue mucinex bid, add delsym as paroxysmal coughing causes dyspnea; prn tessalon  -- Add low dose ativan.   -- Needs home O2, d/w CM.     Leukocytosis   -- Suspect due to steroid use. No evidence active infection. CTM.     Anxiety  Depression  -- Cont buspar and paxil      CAD  HLD  HTN  -- Controlled, cont statin, ASA, cozaar and cardizem      Chronic back pain  -- Cont norco and decreased Neurontin dose based on CrCl     Tobacco Use  -- NRT offered. Patient refused as she \"has quit.\"     This patient's problems and plans were partially entered by my partner and updated as appropriate by me 10/09/21.      DVT prophylaxis:  Medical DVT prophylaxis orders are present.       AM-PAC 6 Clicks Score (PT): 17 (10/08/21 6607)    Disposition: I expect the patient to be discharged home in 1-2 days.    CODE STATUS:   Code Status and Medical Interventions:   Ordered at: 10/08/21 6637     Code Status:    CPR     Medical Interventions (Level of Support Prior to Arrest):    Full       Jil Silva II, DO  10/09/21            "

## 2021-10-10 ENCOUNTER — APPOINTMENT (OUTPATIENT)
Dept: GENERAL RADIOLOGY | Facility: HOSPITAL | Age: 76
End: 2021-10-10

## 2021-10-10 ENCOUNTER — APPOINTMENT (OUTPATIENT)
Dept: CT IMAGING | Facility: HOSPITAL | Age: 76
End: 2021-10-10

## 2021-10-10 LAB
ALBUMIN SERPL-MCNC: 3.9 G/DL (ref 3.5–5.2)
ALBUMIN/GLOB SERPL: 1.6 G/DL
ALP SERPL-CCNC: 50 U/L (ref 39–117)
ALT SERPL W P-5'-P-CCNC: 56 U/L (ref 1–33)
ANION GAP SERPL CALCULATED.3IONS-SCNC: 13 MMOL/L (ref 5–15)
ARTERIAL PATENCY WRIST A: ABNORMAL
AST SERPL-CCNC: 100 U/L (ref 1–32)
ATMOSPHERIC PRESS: ABNORMAL MM[HG]
BASE EXCESS BLDA CALC-SCNC: -0.9 MMOL/L (ref 0–2)
BDY SITE: ABNORMAL
BILIRUB SERPL-MCNC: 0.3 MG/DL (ref 0–1.2)
BODY TEMPERATURE: 37 C
BUN SERPL-MCNC: 30 MG/DL (ref 8–23)
BUN/CREAT SERPL: 35.3 (ref 7–25)
CALCIUM SPEC-SCNC: 8.9 MG/DL (ref 8.6–10.5)
CHLORIDE SERPL-SCNC: 102 MMOL/L (ref 98–107)
CO2 BLDA-SCNC: 26 MMOL/L (ref 22–33)
CO2 SERPL-SCNC: 23 MMOL/L (ref 22–29)
COHGB MFR BLD: 0.4 % (ref 0–2)
CREAT SERPL-MCNC: 0.85 MG/DL (ref 0.57–1)
D DIMER PPP FEU-MCNC: 0.86 MCGFEU/ML (ref 0–0.56)
DEPRECATED RDW RBC AUTO: 50.2 FL (ref 37–54)
ERYTHROCYTE [DISTWIDTH] IN BLOOD BY AUTOMATED COUNT: 15.3 % (ref 12.3–15.4)
GFR SERPL CREATININE-BSD FRML MDRD: 65 ML/MIN/1.73
GLOBULIN UR ELPH-MCNC: 2.5 GM/DL
GLUCOSE SERPL-MCNC: 185 MG/DL (ref 65–99)
HCO3 BLDA-SCNC: 24.6 MMOL/L (ref 20–26)
HCT VFR BLD AUTO: 37.1 % (ref 34–46.6)
HCT VFR BLD CALC: 36.7 %
HGB BLD-MCNC: 11.7 G/DL (ref 12–15.9)
HGB BLDA-MCNC: 12 G/DL (ref 14–18)
INHALED O2 CONCENTRATION: 50 %
MCH RBC QN AUTO: 28.3 PG (ref 26.6–33)
MCHC RBC AUTO-ENTMCNC: 31.5 G/DL (ref 31.5–35.7)
MCV RBC AUTO: 89.8 FL (ref 79–97)
METHGB BLD QL: 0.4 % (ref 0–1.5)
MODALITY: ABNORMAL
NOTE: ABNORMAL
OXYHGB MFR BLDV: 93.2 % (ref 94–99)
PCO2 BLDA: 43.4 MM HG (ref 35–45)
PCO2 TEMP ADJ BLD: 43.4 MM HG (ref 35–45)
PH BLDA: 7.36 PH UNITS (ref 7.35–7.45)
PH, TEMP CORRECTED: 7.36 PH UNITS
PLATELET # BLD AUTO: 338 10*3/MM3 (ref 140–450)
PMV BLD AUTO: 10.7 FL (ref 6–12)
PO2 BLDA: 75.2 MM HG (ref 83–108)
PO2 TEMP ADJ BLD: 75.2 MM HG (ref 83–108)
POTASSIUM SERPL-SCNC: 5.2 MMOL/L (ref 3.5–5.2)
PROT SERPL-MCNC: 6.4 G/DL (ref 6–8.5)
RBC # BLD AUTO: 4.13 10*6/MM3 (ref 3.77–5.28)
SODIUM SERPL-SCNC: 138 MMOL/L (ref 136–145)
VENTILATOR MODE: ABNORMAL
WBC # BLD AUTO: 11.98 10*3/MM3 (ref 3.4–10.8)

## 2021-10-10 PROCEDURE — 94660 CPAP INITIATION&MGMT: CPT

## 2021-10-10 PROCEDURE — 80053 COMPREHEN METABOLIC PANEL: CPT | Performed by: INTERNAL MEDICINE

## 2021-10-10 PROCEDURE — 25010000002 METHYLPREDNISOLONE PER 125 MG: Performed by: INTERNAL MEDICINE

## 2021-10-10 PROCEDURE — 94799 UNLISTED PULMONARY SVC/PX: CPT

## 2021-10-10 PROCEDURE — 99291 CRITICAL CARE FIRST HOUR: CPT | Performed by: INTERNAL MEDICINE

## 2021-10-10 PROCEDURE — 0 IOPAMIDOL PER 1 ML: Performed by: INTERNAL MEDICINE

## 2021-10-10 PROCEDURE — 36600 WITHDRAWAL OF ARTERIAL BLOOD: CPT

## 2021-10-10 PROCEDURE — 85379 FIBRIN DEGRADATION QUANT: CPT | Performed by: INTERNAL MEDICINE

## 2021-10-10 PROCEDURE — 85027 COMPLETE CBC AUTOMATED: CPT | Performed by: INTERNAL MEDICINE

## 2021-10-10 PROCEDURE — 25010000002 HEPARIN (PORCINE) PER 1000 UNITS: Performed by: NURSE PRACTITIONER

## 2021-10-10 PROCEDURE — 93010 ELECTROCARDIOGRAM REPORT: CPT | Performed by: INTERNAL MEDICINE

## 2021-10-10 PROCEDURE — 82375 ASSAY CARBOXYHB QUANT: CPT

## 2021-10-10 PROCEDURE — 93005 ELECTROCARDIOGRAM TRACING: CPT | Performed by: INTERNAL MEDICINE

## 2021-10-10 PROCEDURE — 71045 X-RAY EXAM CHEST 1 VIEW: CPT

## 2021-10-10 PROCEDURE — 83050 HGB METHEMOGLOBIN QUAN: CPT

## 2021-10-10 PROCEDURE — 71275 CT ANGIOGRAPHY CHEST: CPT

## 2021-10-10 PROCEDURE — 82805 BLOOD GASES W/O2 SATURATION: CPT

## 2021-10-10 PROCEDURE — 25010000002 FUROSEMIDE PER 20 MG: Performed by: INTERNAL MEDICINE

## 2021-10-10 RX ORDER — LORAZEPAM 2 MG/ML
0.5 INJECTION INTRAMUSCULAR EVERY 6 HOURS PRN
Status: DISCONTINUED | OUTPATIENT
Start: 2021-10-10 | End: 2021-10-12 | Stop reason: HOSPADM

## 2021-10-10 RX ORDER — FUROSEMIDE 10 MG/ML
40 INJECTION INTRAMUSCULAR; INTRAVENOUS ONCE
Status: COMPLETED | OUTPATIENT
Start: 2021-10-10 | End: 2021-10-10

## 2021-10-10 RX ADMIN — PRAVASTATIN SODIUM 80 MG: 40 TABLET ORAL at 20:00

## 2021-10-10 RX ADMIN — BUDESONIDE AND FORMOTEROL FUMARATE DIHYDRATE 2 PUFF: 80; 4.5 AEROSOL RESPIRATORY (INHALATION) at 19:15

## 2021-10-10 RX ADMIN — IPRATROPIUM BROMIDE AND ALBUTEROL SULFATE 3 ML: 2.5; .5 SOLUTION RESPIRATORY (INHALATION) at 12:33

## 2021-10-10 RX ADMIN — GUAIFENESIN 1200 MG: 600 TABLET, EXTENDED RELEASE ORAL at 20:00

## 2021-10-10 RX ADMIN — Medication 60 MG: at 21:51

## 2021-10-10 RX ADMIN — DOXYCYCLINE 100 MG: 100 INJECTION, POWDER, LYOPHILIZED, FOR SOLUTION INTRAVENOUS at 06:03

## 2021-10-10 RX ADMIN — LORAZEPAM 0.5 MG: 0.5 TABLET ORAL at 14:31

## 2021-10-10 RX ADMIN — METHYLPREDNISOLONE SODIUM SUCCINATE 60 MG: 125 INJECTION, POWDER, FOR SOLUTION INTRAMUSCULAR; INTRAVENOUS at 06:04

## 2021-10-10 RX ADMIN — GABAPENTIN 300 MG: 300 CAPSULE ORAL at 21:14

## 2021-10-10 RX ADMIN — GABAPENTIN 300 MG: 300 CAPSULE ORAL at 14:28

## 2021-10-10 RX ADMIN — IPRATROPIUM BROMIDE AND ALBUTEROL SULFATE 3 ML: 2.5; .5 SOLUTION RESPIRATORY (INHALATION) at 16:27

## 2021-10-10 RX ADMIN — PAROXETINE HYDROCHLORIDE 40 MG: 20 TABLET, FILM COATED ORAL at 08:51

## 2021-10-10 RX ADMIN — DOXYCYCLINE 100 MG: 100 INJECTION, POWDER, LYOPHILIZED, FOR SOLUTION INTRAVENOUS at 17:07

## 2021-10-10 RX ADMIN — SODIUM CHLORIDE, PRESERVATIVE FREE 10 ML: 5 INJECTION INTRAVENOUS at 20:00

## 2021-10-10 RX ADMIN — SODIUM CHLORIDE, PRESERVATIVE FREE 10 ML: 5 INJECTION INTRAVENOUS at 08:51

## 2021-10-10 RX ADMIN — MECLIZINE 25 MG: 12.5 TABLET ORAL at 08:51

## 2021-10-10 RX ADMIN — GABAPENTIN 300 MG: 300 CAPSULE ORAL at 06:05

## 2021-10-10 RX ADMIN — PANTOPRAZOLE SODIUM 40 MG: 40 TABLET, DELAYED RELEASE ORAL at 08:50

## 2021-10-10 RX ADMIN — HEPARIN SODIUM 5000 UNITS: 5000 INJECTION, SOLUTION INTRAVENOUS; SUBCUTANEOUS at 08:50

## 2021-10-10 RX ADMIN — BUDESONIDE AND FORMOTEROL FUMARATE DIHYDRATE 2 PUFF: 80; 4.5 AEROSOL RESPIRATORY (INHALATION) at 12:35

## 2021-10-10 RX ADMIN — FUROSEMIDE 40 MG: 10 INJECTION, SOLUTION INTRAMUSCULAR; INTRAVENOUS at 07:20

## 2021-10-10 RX ADMIN — IPRATROPIUM BROMIDE AND ALBUTEROL SULFATE 3 ML: 2.5; .5 SOLUTION RESPIRATORY (INHALATION) at 07:11

## 2021-10-10 RX ADMIN — IPRATROPIUM BROMIDE AND ALBUTEROL SULFATE 3 ML: 2.5; .5 SOLUTION RESPIRATORY (INHALATION) at 19:15

## 2021-10-10 RX ADMIN — DILTIAZEM HYDROCHLORIDE 240 MG: 240 CAPSULE, COATED, EXTENDED RELEASE ORAL at 08:50

## 2021-10-10 RX ADMIN — BUSPIRONE HYDROCHLORIDE 10 MG: 10 TABLET ORAL at 08:51

## 2021-10-10 RX ADMIN — IOPAMIDOL 80 ML: 755 INJECTION, SOLUTION INTRAVENOUS at 13:13

## 2021-10-10 RX ADMIN — ZOLPIDEM TARTRATE 5 MG: 5 TABLET ORAL at 20:00

## 2021-10-10 RX ADMIN — LOSARTAN POTASSIUM 50 MG: 50 TABLET, FILM COATED ORAL at 08:51

## 2021-10-10 RX ADMIN — GUAIFENESIN 1200 MG: 600 TABLET, EXTENDED RELEASE ORAL at 08:50

## 2021-10-10 RX ADMIN — METHYLPREDNISOLONE SODIUM SUCCINATE 60 MG: 125 INJECTION, POWDER, FOR SOLUTION INTRAMUSCULAR; INTRAVENOUS at 17:06

## 2021-10-10 RX ADMIN — ASPIRIN 325 MG ORAL TABLET 325 MG: 325 PILL ORAL at 08:50

## 2021-10-10 RX ADMIN — MECLIZINE 25 MG: 12.5 TABLET ORAL at 20:00

## 2021-10-10 RX ADMIN — Medication 60 MG: at 08:51

## 2021-10-10 RX ADMIN — LORAZEPAM 0.5 MG: 0.5 TABLET ORAL at 21:14

## 2021-10-10 RX ADMIN — HEPARIN SODIUM 5000 UNITS: 5000 INJECTION, SOLUTION INTRAVENOUS; SUBCUTANEOUS at 20:00

## 2021-10-10 NOTE — PLAN OF CARE
"Goal Outcome Evaluation:      Pt alert and oriented x4. NSR throughout shift. No complains of pain or shortness of breath throughout the night. At 0702 PCT reported pt c/o \"air hunger\". SpO2 dropped to low 80`s. Tachycardic.BP stable. RR 30. Pt anxious. Placed Bipap on pt and RR decreased. EKG done.  Paged for breathing treatments. Paged Dr. Silva stat. Labs and Xray ordered. On coming nurse was at bedside.            "

## 2021-10-10 NOTE — PLAN OF CARE
"Goal Outcome Evaluation:  Progress: improving   Pt alert and oriented x 4. NSR on monitor majority of shift. Tachycardic this AM but shortly after Lortab administered she returned to NSR. Very frequent congested cough. Had \"coughing spell\" and pt c/o \"air hunger\" and \"can't breathe\". SpO2 dropped to low to mid 80's and HR sustained in the 150's-180's. Pt anxious, placed BiPAP on pt and called respiratory for breathing treatment and to assess pt. Lungs are coarse and wheezes are throughout all lobes. Pt did improved after treatment and BiPAP but still c/o anxiety. Called Dr. Silva and received he placed order for 0.5 mg PO Ativan Q 6 PRN. Given as soon as verified by pharmacy and pt stated it helped. Potassium replaced per protocol. C/o back pain. Lortab given x 2. VSS besides coughing episode and this AM. Pt c/o \"things getting caught when swallowing\" while talking to respiratory. Speech eval placed to evaluate. Diet changed to mechanical soft with thin liquids but instructed to avoid straws. Pt able to take pills whole at this time and tolerated well. Bowel regimen given and Pt had BM this evening. No further needs noted at this time.   "

## 2021-10-10 NOTE — PROGRESS NOTES
[facility] Medicine Services  PROGRESS NOTE    Patient Name: Pilar Colon  : 1945  MRN: [id number]    Date of Admission: 10/7/2021  Primary Care Physician: Omer Diaz MD    Subjective   Subjective     CC: f/u COPD    HPI: Called @ 0710 by nursing that patient desaturated into 70's w/ RR 30 times per minute. Patient placed on bipap w/ stat imaging, labs ordered. Patient says out of no where she felt as if she couldn't breath and then became quite anxious making things worse.    ROS:  Gen- No fevers, chills  CV- No chest pain, palpitations  Resp- No cough, dyspnea  GI- No N/V/D, abd pain     Objective   Objective     Vital Signs:   Temp:  [97.9 °F (36.6 °C)-98.3 °F (36.8 °C)] 98.2 °F (36.8 °C)  Heart Rate:  [66-82] 73  Resp:  [18-30] 30  BP: (101-131)/(59-98) 129/98  Flow (L/min):  [2.5-80] 35     Physical Exam:  Constitutional: Mod distress, awake, alert  HENT: NCAT, mucous membranes moist, bipap  Respiratory: Respiratory distress, bilateral wheezes  Cardiovascular: RRR, no murmurs, rubs, or gallops  Gastrointestinal: Positive bowel sounds, soft, nontender, nondistended  Musculoskeletal: No bilateral ankle edema  Psychiatric: Anxious  Neurologic: Oriented x 3, strength symmetric in all extremities, Cranial Nerves grossly intact to confrontation, speech clear  Skin: No rashes    Results Reviewed:  LAB RESULTS:      Lab 10/10/21  0723 10/08/21  0658 10/07/21  1323   WBC 11.98* 5.85 13.94*   HEMOGLOBIN 11.7* 10.8* 11.5*   HEMATOCRIT 37.1 32.5* 35.8   PLATELETS 338 274 344   NEUTROS ABS  --  4.76 12.32*   IMMATURE GRANS (ABS)  --  0.05 0.10*   LYMPHS ABS  --  0.75 0.93   MONOS ABS  --  0.29 0.57   EOS ABS  --  0.00 0.01   MCV 89.8 87.1 88.8   PROCALCITONIN  --   --  0.05         Lab 10/10/21  0723 10/08/21  0658 10/07/21  1323   SODIUM 138 140 144   POTASSIUM 5.2 3.6 3.9   CHLORIDE 102 101 105   CO2 23.0 30.0* 26.0   ANION GAP 13.0 9.0 13.0   BUN 30* 26* 23   CREATININE 0.85 0.84  0.94   GLUCOSE 185* 154* 131*   CALCIUM 8.9 8.7 9.4   MAGNESIUM  --   --  2.2         Lab 10/10/21  0723 10/07/21  1323   TOTAL PROTEIN 6.4 7.4   ALBUMIN 3.90 4.60   GLOBULIN 2.5 2.8   ALT (SGPT) 56* 19   AST (SGOT) 100* 36*   BILIRUBIN 0.3 0.3   ALK PHOS 50 61         Lab 10/07/21  1323   PROBNP 4,884.0*   TROPONIN T 0.012                 Lab 10/10/21  0728 10/07/21  2216   PH, ARTERIAL 7.362 7.438   PCO2, ARTERIAL 43.4 37.5   PO2 ART 75.2* 150.0*   FIO2 50 100   HCO3 ART 24.6 25.3   BASE EXCESS ART -0.9* 1.2   CARBOXYHEMOGLOBIN 0.4 0.3     Brief Urine Lab Results     None          Microbiology Results Abnormal     Procedure Component Value - Date/Time    COVID PRE-OP / PRE-PROCEDURE SCREENING ORDER (NO ISOLATION) - Swab, Nasopharynx [[id number]]  (Normal) Collected: 10/07/21 1518    Lab Status: Final result Specimen: Swab from Nasopharynx Updated: 10/07/21 1552    Narrative:      The following orders were created for panel order COVID PRE-OP / PRE-PROCEDURE SCREENING ORDER (NO ISOLATION) - Swab, Nasopharynx.  Procedure                               Abnormality         Status                     ---------                               -----------         ------                     COVID-19 and FLU A/B PCR...[[id number]]  Normal              Final result                 Please view results for these tests on the individual orders.    COVID-19 and FLU A/B PCR - Swab, Nasopharynx [[id number]]  (Normal) Collected: 10/07/21 1518    Lab Status: Final result Specimen: Swab from Nasopharynx Updated: 10/07/21 1552     COVID19 Not Detected     Influenza A PCR Not Detected     Influenza B PCR Not Detected    Narrative:      Fact sheet for providers: https://www.fda.gov/media/092080/download    Fact sheet for patients: https://www.fda.gov/media/430369/download    Test performed by PCR.          XR Chest 1 View    Result Date: 10/10/2021  CR Chest 1 Vw INDICATION: Shortness of air today. COPD. COMPARISON:  Chest 10/7/2021  FINDINGS: Portable AP view(s) of the chest. The heart and mediastinal contours are normal. Emphysema. The lungs are clear. No pneumothorax or pleural effusion.     Impression: Emphysema. No other acute chest findings. Signer Name: Johnathon Rey MD  Signed: 10/10/2021 8:00 AM  Workstation Name: [email or web address]  [facility]          I have reviewed the medications:  Scheduled Meds:albuterol, , ,   aspirin, 325 mg, Oral, Daily  budesonide-formoterol, 2 puff, Inhalation, BID - RT  busPIRone, 10 mg, Oral, Daily  dextromethorphan polistirex ER, 60 mg, Oral, Q12H  dilTIAZem CD, 240 mg, Oral, Q24H  doxycycline, 100 mg, Intravenous, Q12H  gabapentin, 300 mg, Oral, Q8H  guaiFENesin, 1,200 mg, Oral, Q12H  heparin (porcine), 5,000 Units, Subcutaneous, Q12H  ipratropium-albuterol, 3 mL, Nebulization, Q4H - RT  losartan, 50 mg, Oral, Daily  meclizine, 25 mg, Oral, BID  methylPREDNISolone sodium succinate, 60 mg, Intravenous, Q12H  pantoprazole, 40 mg, Oral, Daily  PARoxetine, 40 mg, Oral, Daily  pharmacy consult - MTM, , Does not apply, Daily  pravastatin, 80 mg, Oral, Nightly  sodium chloride, 10 mL, Intravenous, Q12H  zolpidem, 5 mg, Oral, Nightly      Continuous Infusions:   PRN Meds:.•  acetaminophen **OR** acetaminophen **OR** acetaminophen  •  benzonatate  •  senna-docusate sodium **AND** polyethylene glycol **AND** bisacodyl **AND** bisacodyl  •  HYDROcodone-acetaminophen  •  ipratropium  •  LORazepam  •  LORazepam  •  ondansetron **OR** ondansetron  •  potassium chloride **OR** potassium chloride **OR** potassium chloride  •  sodium chloride    Assessment/Plan   Assessment & Plan     Active Hospital Problems    Diagnosis  POA   • COPD with acute exacerbation (HCC) [J44.1]  Unknown   • Anxiety disorder [F41.9]  Yes   • Depression [F32.A]  Yes   • HLD (hyperlipidemia) [E78.5]  Yes   • HTN (hypertension) [I10]  Yes   • Hypoxia [R09.02]  Yes   • COPD exacerbation (HCC) [J44.1]  Yes      Resolved Hospital  "Problems   No resolved problems to display.        Brief Hospital Course to date:  Pilar Colon is a 76 y.o. female lifelong smoker (quit a week ago) with COPD, CAD, HTN HL here with COPD exacerbation.     COPD with exac  Acute hypoxic respiratory failure  -- Had significant decompensation this morning for unclear cause. Check stat CXR, labs including ABG and d-dimer. If d dimer + will need CTA.  -- Give IV lasix, steroids, nebulizer treatment now.  -- Continue bipap.  -- Continue doxycycline, symbicort.  -- COPD navigator consult   -- Continue mucinex bid, add delsym as paroxysmal coughing causes dyspnea; prn tessalon  -- Add low dose ativan for anxiety. Probably needs small dose at d/c.  -- Needs home O2, d/w CM.     Leukocytosis   -- Suspect due to steroid use. No evidence active infection. CTM.     Anxiety  Depression  -- Cont buspar and paxil      CAD  HLD  HTN  -- Controlled, cont statin, ASA, cozaar and cardizem      Chronic back pain  -- Cont norco and decreased Neurontin dose based on CrCl     Tobacco Use  -- NRT offered. Patient refused as she \"has quit.\"    This patient's problems and plans were partially entered by my partner and updated as appropriate by me 10/10/21.    40 minutes of critical care provided. This time excludes other billable procedures. Time does include preparation of documents, medical consultations, review of old records, and direct bedside care. Patient was at high risk for life-threatening deterioration due to respiratory failure requiring NIPPV.     DVT prophylaxis:  Medical DVT prophylaxis orders are present.       AM-PAC 6 Clicks Score (PT): 19 (10/09/21 0820)    Disposition: I expect the patient to be discharged TBD.    CODE STATUS:   Code Status and Medical Interventions:   Ordered at: 10/08/21 1314     Code Status:    CPR     Medical Interventions (Level of Support Prior to Arrest):    Mirna Silva II, DO  10/10/21            "

## 2021-10-11 ENCOUNTER — APPOINTMENT (OUTPATIENT)
Dept: GENERAL RADIOLOGY | Facility: HOSPITAL | Age: 76
End: 2021-10-11

## 2021-10-11 LAB
QT INTERVAL: 340 MS
QTC INTERVAL: 436 MS

## 2021-10-11 PROCEDURE — 74240 X-RAY XM UPR GI TRC 1CNTRST: CPT

## 2021-10-11 PROCEDURE — 25010000002 HEPARIN (PORCINE) PER 1000 UNITS: Performed by: NURSE PRACTITIONER

## 2021-10-11 PROCEDURE — 25010000002 METHYLPREDNISOLONE PER 125 MG: Performed by: INTERNAL MEDICINE

## 2021-10-11 PROCEDURE — 94660 CPAP INITIATION&MGMT: CPT

## 2021-10-11 PROCEDURE — 94799 UNLISTED PULMONARY SVC/PX: CPT

## 2021-10-11 PROCEDURE — 99233 SBSQ HOSP IP/OBS HIGH 50: CPT | Performed by: FAMILY MEDICINE

## 2021-10-11 PROCEDURE — 92526 ORAL FUNCTION THERAPY: CPT | Performed by: SPEECH-LANGUAGE PATHOLOGIST

## 2021-10-11 PROCEDURE — 92611 MOTION FLUOROSCOPY/SWALLOW: CPT | Performed by: SPEECH-LANGUAGE PATHOLOGIST

## 2021-10-11 PROCEDURE — 97530 THERAPEUTIC ACTIVITIES: CPT

## 2021-10-11 PROCEDURE — 74230 X-RAY XM SWLNG FUNCJ C+: CPT

## 2021-10-11 RX ORDER — DOXYCYCLINE 100 MG/1
100 CAPSULE ORAL EVERY 12 HOURS SCHEDULED
Status: DISCONTINUED | OUTPATIENT
Start: 2021-10-11 | End: 2021-10-12 | Stop reason: HOSPADM

## 2021-10-11 RX ADMIN — BENZONATATE 200 MG: 100 CAPSULE ORAL at 15:13

## 2021-10-11 RX ADMIN — LORAZEPAM 0.5 MG: 0.5 TABLET ORAL at 21:26

## 2021-10-11 RX ADMIN — PANTOPRAZOLE SODIUM 40 MG: 40 TABLET, DELAYED RELEASE ORAL at 09:09

## 2021-10-11 RX ADMIN — METHYLPREDNISOLONE SODIUM SUCCINATE 60 MG: 125 INJECTION, POWDER, FOR SOLUTION INTRAMUSCULAR; INTRAVENOUS at 17:13

## 2021-10-11 RX ADMIN — IPRATROPIUM BROMIDE AND ALBUTEROL SULFATE 3 ML: 2.5; .5 SOLUTION RESPIRATORY (INHALATION) at 12:23

## 2021-10-11 RX ADMIN — GUAIFENESIN 1200 MG: 600 TABLET, EXTENDED RELEASE ORAL at 09:09

## 2021-10-11 RX ADMIN — BUDESONIDE AND FORMOTEROL FUMARATE DIHYDRATE 2 PUFF: 80; 4.5 AEROSOL RESPIRATORY (INHALATION) at 19:20

## 2021-10-11 RX ADMIN — PRAVASTATIN SODIUM 80 MG: 40 TABLET ORAL at 21:12

## 2021-10-11 RX ADMIN — SODIUM CHLORIDE, PRESERVATIVE FREE 10 ML: 5 INJECTION INTRAVENOUS at 09:09

## 2021-10-11 RX ADMIN — IPRATROPIUM BROMIDE AND ALBUTEROL SULFATE 3 ML: 2.5; .5 SOLUTION RESPIRATORY (INHALATION) at 03:02

## 2021-10-11 RX ADMIN — IPRATROPIUM BROMIDE AND ALBUTEROL SULFATE 3 ML: 2.5; .5 SOLUTION RESPIRATORY (INHALATION) at 16:18

## 2021-10-11 RX ADMIN — BUDESONIDE AND FORMOTEROL FUMARATE DIHYDRATE 2 PUFF: 80; 4.5 AEROSOL RESPIRATORY (INHALATION) at 07:28

## 2021-10-11 RX ADMIN — PAROXETINE HYDROCHLORIDE 40 MG: 20 TABLET, FILM COATED ORAL at 09:09

## 2021-10-11 RX ADMIN — SODIUM CHLORIDE, PRESERVATIVE FREE 10 ML: 5 INJECTION INTRAVENOUS at 21:13

## 2021-10-11 RX ADMIN — BARIUM SULFATE 20 ML: 400 PASTE ORAL at 12:13

## 2021-10-11 RX ADMIN — MECLIZINE 25 MG: 12.5 TABLET ORAL at 09:09

## 2021-10-11 RX ADMIN — DOXYCYCLINE 100 MG: 100 CAPSULE ORAL at 21:12

## 2021-10-11 RX ADMIN — ZOLPIDEM TARTRATE 5 MG: 5 TABLET ORAL at 21:12

## 2021-10-11 RX ADMIN — GABAPENTIN 300 MG: 300 CAPSULE ORAL at 21:12

## 2021-10-11 RX ADMIN — IPRATROPIUM BROMIDE AND ALBUTEROL SULFATE 3 ML: 2.5; .5 SOLUTION RESPIRATORY (INHALATION) at 07:28

## 2021-10-11 RX ADMIN — IPRATROPIUM BROMIDE AND ALBUTEROL SULFATE 3 ML: 2.5; .5 SOLUTION RESPIRATORY (INHALATION) at 19:20

## 2021-10-11 RX ADMIN — DOXYCYCLINE 100 MG: 100 INJECTION, POWDER, LYOPHILIZED, FOR SOLUTION INTRAVENOUS at 05:34

## 2021-10-11 RX ADMIN — MECLIZINE 25 MG: 12.5 TABLET ORAL at 21:12

## 2021-10-11 RX ADMIN — GABAPENTIN 300 MG: 300 CAPSULE ORAL at 05:36

## 2021-10-11 RX ADMIN — BUSPIRONE HYDROCHLORIDE 10 MG: 10 TABLET ORAL at 09:09

## 2021-10-11 RX ADMIN — HEPARIN SODIUM 5000 UNITS: 5000 INJECTION, SOLUTION INTRAVENOUS; SUBCUTANEOUS at 21:13

## 2021-10-11 RX ADMIN — GUAIFENESIN 1200 MG: 600 TABLET, EXTENDED RELEASE ORAL at 21:12

## 2021-10-11 RX ADMIN — METHYLPREDNISOLONE SODIUM SUCCINATE 60 MG: 125 INJECTION, POWDER, FOR SOLUTION INTRAMUSCULAR; INTRAVENOUS at 05:35

## 2021-10-11 RX ADMIN — ASPIRIN 325 MG ORAL TABLET 325 MG: 325 PILL ORAL at 09:09

## 2021-10-11 RX ADMIN — Medication 60 MG: at 10:35

## 2021-10-11 RX ADMIN — Medication 60 MG: at 21:12

## 2021-10-11 RX ADMIN — DILTIAZEM HYDROCHLORIDE 240 MG: 240 CAPSULE, COATED, EXTENDED RELEASE ORAL at 09:09

## 2021-10-11 RX ADMIN — LOSARTAN POTASSIUM 50 MG: 50 TABLET, FILM COATED ORAL at 09:09

## 2021-10-11 RX ADMIN — BARIUM SULFATE 100 ML: 0.81 POWDER, FOR SUSPENSION ORAL at 12:13

## 2021-10-11 RX ADMIN — GABAPENTIN 300 MG: 300 CAPSULE ORAL at 15:13

## 2021-10-11 RX ADMIN — HEPARIN SODIUM 5000 UNITS: 5000 INJECTION, SOLUTION INTRAVENOUS; SUBCUTANEOUS at 09:09

## 2021-10-11 NOTE — PLAN OF CARE
Goal Outcome Evaluation:  Plan of Care Reviewed With: patient        Progress: no change   SLP evaluation completed. Will continue to address dysphagia. MBS completed. Please see note for further details and recommendations.

## 2021-10-11 NOTE — PLAN OF CARE
Goal Outcome Evaluation:  Plan of Care Reviewed With: patient        Progress: improving  Outcome Summary: A&Ox4, VSS, on 2LNC. MBS today, see notes from SLP. PRN tessalon pearls for cough. One visitor seen this shift. D/C plans are home tomorrow if medically stable. Denies any c/o pain or further needs at this time.

## 2021-10-11 NOTE — CASE MANAGEMENT/SOCIAL WORK
Continued Stay Note  [facility]     Patient Name: Pilar Colon  MRN: [id number]  Today's Date: 10/11/2021    Admit Date: 10/7/2021     Discharge Plan     Row Name 10/11/21 1354       Plan    Plan Home    Patient/Family in Agreement with Plan yes    Plan Comments Spoke with patient in room.  Therapy recommending inpatient rehab at discharge.  Patient states that her plan is to return home at discharge and she already has a rollator.  CM asked to arrange BiPAP/Trilogy for patient.  Patient does not have a qualifying ABG.  Will need an outpatient sleep study at discharge.  CM will continue to follow.  Zoila Kwok RN [phone]    Final Discharge Disposition Code 01 - home or self-care               Discharge Codes    No documentation.               Expected Discharge Date and Time     Expected Discharge Date Expected Discharge Time    Oct 13, 2021             Zoila Kwok RN

## 2021-10-11 NOTE — MBS/VFSS/FEES
Acute Care - Speech Language Pathology   Swallow Initial Evaluation [facility]   Modified Barium Swallow Study (MBS)       Patient Name: Pilar Colon  : 1945  MRN: [id number]  Today's Date: 10/11/2021               Admit Date: 10/7/2021    Visit Dx:     ICD-10-CM ICD-9-CM   1. COPD exacerbation (HCC)  J44.1 491.21   2. Hypoxia  R09.02 799.02   3. Oropharyngeal dysphagia  R13.12 787.22     Patient Active Problem List   Diagnosis   • COPD with acute exacerbation (HCC)   • Anxiety disorder   • Depression   • HLD (hyperlipidemia)   • HTN (hypertension)   • Hypoxia   • COPD exacerbation (HCC)     Past Medical History:   Diagnosis Date   • Anemia    • Anxiety    • Arthritis    • Bronchitis    • COPD (chronic obstructive pulmonary disease) (HCC)    • Depression    • Diverticulitis    • Emphysema (subcutaneous) (surgical) resulting from a procedure    • History of left heart catheterization     STENT PLACED   • Hyperlipidemia    • Hypertension    • Low back pain    • Osteoporosis    • Pneumonia      Past Surgical History:   Procedure Laterality Date   • BREAST BIOPSY Right     [place], Dr. Severo Kay   • HAND SURGERY Left     Arthritis surgery, [place], [place]   • SHOULDER SURGERY Bilateral     [place]. Dr. Dixon   • TUBAL ABDOMINAL LIGATION         SLP Recommendation and Plan  SLP Swallowing Diagnosis: mild, oral dysphagia, pharyngeal dysphagia (10/11/21 1150)  SLP Diet Recommendation: soft textures, chopped, thin liquids (10/11/21 1150)  Recommended Precautions and Strategies: upright posture during/after eating, small bites of food and sips of liquid, general aspiration precautions, reflux precautions, fatigue precautions (10/11/21 1150)  SLP Rec. for Method of Medication Administration: meds whole, with pudding or applesauce, with thin liquids, as tolerated (10/11/21 1150)     Monitor for Signs of Aspiration: yes, notify SLP if any concerns (10/11/21 1150)  Recommended Diagnostics: VFSS (MBS) (10/11/21  0930)  Swallow Criteria for Skilled Therapeutic Interventions Met: demonstrates skilled criteria (10/11/21 1150)  Anticipated Discharge Disposition (SLP): unknown, anticipate therapy at next level of care (10/11/21 1150)  Rehab Potential/Prognosis, Swallowing: good, to achieve stated therapy goals (10/11/21 1150)  Therapy Frequency (Swallow): 5 days per week (10/11/21 1150)  Predicted Duration Therapy Intervention (Days): until discharge (10/11/21 1150)     Daily Summary of Progress (SLP): progress toward functional goals as expected (10/11/21 0930)    Plan for Continued Treatment (SLP): MBS completed (10/11/21 1150)              Plan of Care Reviewed With: patient  Progress: no change      SWALLOW EVALUATION (last 72 hours)     SLP Adult Swallow Evaluation     Row Name 10/11/21 1150       Rehab Evaluation    Document Type evaluation  -CJ    Subjective Information no complaints  -CJ    Patient Observations alert; cooperative  -CJ    Patient/Family/Caregiver Comments/Observations no family present  -CJ    Care Plan Review evaluation/treatment results reviewed; care plan/treatment goals reviewed; patient/other agree to care plan  -    Care Plan Review, Other Participant(s) --    Patient Effort good  -CJ    Symptoms Noted During/After Treatment none  -CJ            General Information    Patient Profile Reviewed yes  -CJ    Pertinent History Of Current Problem see prev eval; referred for MBS  -    Current Method of Nutrition regular textures; thin liquids  -CJ    Precautions/Limitations, Vision WFL; for purposes of eval  -CJ    Precautions/Limitations, Hearing WFL; for purposes of eval  -CJ    Prior Level of Function-Communication WFL  -CJ    Prior Level of Function-Swallowing no diet consistency restrictions  -    Plans/Goals Discussed with patient; family; agreed upon  -    Barriers to Rehab none identified  -CJ    Patient's Goals for Discharge patient did not state  -    Family Goals for Discharge --             Pain    Additional Documentation Pain Scale: FACES Pre/Post-Treatment (Group)  -CJ            Pain Scale: FACES Pre/Post-Treatment    Pain: FACES Scale, Pretreatment 0-->no hurt  -CJ    Posttreatment Pain Rating 0-->no hurt  -CJ                    MBS/VFSS    Utensils Used spoon; cup; straw  -CJ    Consistencies Trialed regular textures; thin liquids; pudding thick  -CJ            MBS/VFSS Interpretation    Oral Prep Phase impaired oral phase of swallowing  -CJ    Oral Transit Phase impaired  -CJ    Oral Residue WFL  -CJ    VFSS Summary [facility] completed this am. Mrs. Colon is positinoed upright and centered in vess chair to accept po presentations of pudding, solid cracker and thin liquids via spoon, cup and straw. Oral phase is mildly impaired w/ prolonged mastication of solids. Premature spillage of thin liquids to the pyriforms intermittently. Respiratory status felt to be contributing factor. Transient penetration w/ thin liquids via all presentation styles however cleared upon completion of swallow. Penetration was deeper w/ larger bolus and consecutive sips however continued to clear upon completion of swallow w/o significant residue. No other laryngeal penetration or aspiration observed across this evaluation. No significant pharyngeal residue impacting safety of swallowing fnx. Per this evaluation Mrs. Colon presents w/a mild oropharyngeal dysphagia. Will most benefit from modified po diet of mechanical soft, chopped w/ thin liquids. Small single sips. Meds whole in puree/thins as tolerated. will benefit from dysphagia tx for general strengthening  -CJ            Oral Preparatory Phase    Oral Preparatory Phase prolonged manipulation  -    Prolonged Manipulation regular textures; other (see comments)  respiratory status  -CJ            Oral Transit Phase    Impaired Oral Transit Phase increased A-P transit time; premature spillage of liquids into pharynx  -    Increased A-P Transit Time pudding/puree;  regular textures; other (see comments); discoordination of lingual movement  respiratory coordination  -CJ    Premature Spillage of Liquids into Pharynx thin liquids; secondary to reduced lingual control  -CJ            Initiation of Pharyngeal Swallow    Initiation of Pharyngeal Swallow bolus in pyriform sinuses  -CJ    Pharyngeal Phase impaired pharyngeal phase of swallowing  -CJ    Penetration During the Swallow thin liquids; secondary to delayed swallow initiation or mistiming; secondary to reduced vestibular closure  -CJ    Response to Penetration deep; transient; reflexive response  -CJ    Rosenbek's Scale thin:; 2--->level 2  -CJ    Pharyngeal Residue other (see comments)  no significant pharyngeal residue  -CJ    Attempted Compensatory Maneuvers bolus size; bolus presentation style; throat clear after swallow; additional subsequent swallow  -CJ    Response to Attempted Compensatory Maneuvers did not prevent penetration  -CJ    Pharyngeal Phase, Comment Noted osteophyte at C5-C7  -CJ            Esophageal Phase    Esophageal Phase see radiology report for further details  -CJ            Clinical Impression    Daily Summary of Progress (SLP) --    SLP Swallowing Diagnosis mild; oral dysphagia; pharyngeal dysphagia  -CJ    Functional Impact risk of aspiration/pneumonia  -CJ    Rehab Potential/Prognosis, Swallowing good, to achieve stated therapy goals  -    Swallow Criteria for Skilled Therapeutic Interventions Met demonstrates skilled criteria  -    Plan for Continued Treatment (SLP) MBS completed  -            Recommendations    Therapy Frequency (Swallow) 5 days per week  -    Predicted Duration Therapy Intervention (Days) until discharge  -    SLP Diet Recommendation soft textures; chopped; thin liquids  -    Recommended Diagnostics --    Recommended Precautions and Strategies upright posture during/after eating; small bites of food and sips of liquid; general aspiration precautions; reflux  precautions; fatigue precautions  -    Oral Care Recommendations Oral Care BID/PRN  -    SLP Rec. for Method of Medication Administration meds whole; with pudding or applesauce; with thin liquids; as tolerated  -    Monitor for Signs of Aspiration yes; notify SLP if any concerns  -    Anticipated Discharge Disposition (SLP) unknown; anticipate therapy at next level of care  -          User Key  (r) = Recorded By, (t) = Taken By, (c) = Cosigned By    Initials Name Effective Dates     Serina Jha, MS CCC-SLP 06/16/21 -     Lida Dumont MS CCC-SLP 06/15/21 -                 EDUCATION  The patient has been educated in the following areas:   Dysphagia (Swallowing Impairment) Oral Care/Hydration Modified Diet Instruction.        SLP GOALS     Row Name 10/11/21 1150       Oral Nutrition/Hydration Goal 1 (SLP)    Oral Nutrition/Hydration Goal 1, SLP LTG: Pt will tolerate mechanical soft diet with thin liquids without s/sx of aspiration with 100% accuracy  -    Time Frame (Oral Nutrition/Hydration Goal 1, SLP) by discharge  -    Progress/Outcomes (Oral Nutrition/Hydration Goal 1, SLP) goal ongoing  -       Oral Nutrition/Hydration Goal 2 (SLP)    Oral Nutrition/Hydration Goal 2, SLP STG: Pt will accept therapeutic trials of solids and thin liquids without s/sx of aspiration with 100% accuracy  -    Time Frame (Oral Nutrition/Hydration Goal 2, SLP) short term goal (STG)  -    Barriers (Oral Nutrition/Hydration Goal 2, SLP) --    Progress/Outcomes (Oral Nutrition/Hydration Goal 2, SLP) goal revised this date  -       Lingual Strengthening Goal 1 (SLP)    Activity (Lingual Strengthening Goal 1, SLP) increase lingual tone/sensation/control/coordination/movement; increase tongue back strength  -    Increase Lingual Tone/Sensation/Control/Coordination/Movement swallow trials; lingual resistance exercises  -    Increase Tongue Back Strength lingual resistance exercises; swallow trials  -     [place]/Accuracy (Lingual Strengthening Goal 1, SLP) with minimal cues (75-90% accuracy)  -    Time Frame (Lingual Strengthening Goal 1, SLP) short term goal (STG)  -       Pharyngeal Strengthening Exercise Goal 1 (SLP)    Activity (Pharyngeal Strengthening Goal 1, SLP) increase timing; increase closure at entrance to airway/closure of airway at glottis; increase anterior movement of the hyolaryngeal complex  -CJ    Increase Timing prepping - 3 second prep or suck swallow or 3-step swallow  -CJ    Increase Anterior Movement of the Hyolaryngeal Complex chin tuck against resistance (CTAR)  -CJ    Increase Closure at Entrance to Airway/Closure of Airway at Glottis super-supraglottic swallow  -    [place]/Accuracy (Pharyngeal Strengthening Goal 1, SLP) with minimal cues (75-90% accuracy)  -    Time Frame (Pharyngeal Strengthening Goal 1, SLP) short term goal (STG)  -       Swallow Compensatory Strategies Goal 1 (SLP)    Activity (Swallow Compensatory Strategies/Techniques Goal 1, SLP) compensatory strategies; during meal intake; during p.o. trials; small bites; small cup sips; small straw sips; other (see comments)  general aspiration precautions  -    [place]/Accuracy (Swallow Compensatory Strategies/Techniques Goal 1, SLP) independently (over 90% accuracy)  -    Time Frame (Swallow Compensatory Strategies/Techniques Goal 1, SLP) short term goal (STG)  -          User Key  (r) = Recorded By, (t) = Taken By, (c) = Cosigned By    Initials Name Provider Type     Serina Jha MS CCC-SLP Speech and Language Pathologist    Lida Dumont MS CCC-SLP Speech and Language Pathologist                   Time Calculation:    Time Calculation- SLP     Row Name 10/11/21 1317 10/11/21 1126          Time Calculation- SLP    SLP Start Time 1150  -CJ 0930  -CJ     SLP Received On 10/11/21  -CJ 10/11/21  -            Untimed Charges    68283-BC Motion Fluoro Eval Swallow Minutes 55  -CJ --      [id number]-MQ Treatment Swallow Minutes -- 40  -CJ            Total Minutes    Untimed Charges Total Minutes 55  -CJ 40  -CJ      Total Minutes 55  -CJ 40  -CJ           User Key  (r) = Recorded By, (t) = Taken By, (c) = Cosigned By    Initials Name Provider Type    Serina Bain MS CCC-RAINE Speech and Language Pathologist                Therapy Charges for Today     Code Description Service Date Service Provider Modifiers Qty    57134180023 HC ST TREATMENT SWALLOW 3 10/11/2021 Serina Jha MS CCC-SLP GN 1    [id number] HC ST MOTION FLUORO EVAL SWALLOW 4 10/11/2021 Serina Jha MS CCC-SLP GN 1        Patient was not wearing a face mask and did exhibit coughing during this therapy encounter.  Procedure performed was aerosolizing, involved close contact (within 6 feet for at least 15 minutes or longer), and did not involve contact with infectious secretions or specimens.  Therapist used appropriate personal protective equipment including gloves, standard procedure mask and eye protection.  Appropriate PPE was worn during the entire therapy session.  Hand hygiene was completed before and after therapy session.          MS JOSSIE Martin  10/11/2021

## 2021-10-11 NOTE — THERAPY TREATMENT NOTE
Acute Care - Speech Language Pathology   Swallow Treatment Note [facility]     Patient Name: Pilar Colon  : 1945  MRN: [id number]  Today's Date: 10/11/2021               Admit Date: 10/7/2021    Visit Dx:     ICD-10-CM ICD-9-CM   1. COPD exacerbation (HCC)  J44.1 491.21   2. Hypoxia  R09.02 799.02     Patient Active Problem List   Diagnosis   • COPD with acute exacerbation (HCC)   • Anxiety disorder   • Depression   • HLD (hyperlipidemia)   • HTN (hypertension)   • Hypoxia   • COPD exacerbation (HCC)     Past Medical History:   Diagnosis Date   • Anemia    • Anxiety    • Arthritis    • Bronchitis    • COPD (chronic obstructive pulmonary disease) (HCC)    • Depression    • Diverticulitis    • Emphysema (subcutaneous) (surgical) resulting from a procedure    • History of left heart catheterization     STENT PLACED   • Hyperlipidemia    • Hypertension    • Low back pain    • Osteoporosis    • Pneumonia      Past Surgical History:   Procedure Laterality Date   • BREAST BIOPSY Right     [place], Dr. Severo Kay   • HAND SURGERY Left     Arthritis surgery, [place], [place]   • SHOULDER SURGERY Bilateral     [place]. Dr. Dixon   • TUBAL ABDOMINAL LIGATION         SLP Recommendation and Plan  SLP Swallowing Diagnosis: R/O pharyngeal dysphagia (10/11/21 0930)  SLP Diet Recommendation: soft textures, thin liquids, other (see comments) (until [facility]) (10/11/21 0930)  Recommended Precautions and Strategies: upright posture during/after eating, small bites of food and sips of liquid, general aspiration precautions, fatigue precautions (10/11/21 0930)  SLP Rec. for Method of Medication Administration: meds whole, with pudding or applesauce (10/11/21 0930)     Monitor for Signs of Aspiration: yes, notify SLP if any concerns (10/11/21 0930)  Recommended Diagnostics: VFSS ([facility]) (10/11/21 0930)  Swallow Criteria for Skilled Therapeutic Interventions Met: demonstrates skilled criteria (10/11/21 0930)  Anticipated  "Discharge Disposition (SLP): unknown, anticipate therapy at next level of care (10/11/21 0930)  Rehab Potential/Prognosis, Swallowing: good, to achieve stated therapy goals (10/11/21 0930)     Predicted Duration Therapy Intervention (Days): until discharge (10/11/21 0930)     Daily Summary of Progress (SLP): progress toward functional goals as expected (10/11/21 0930)    Plan for Continued Treatment (SLP): Pt seen for diet tolerance this am. Pt reports ongoing sensation of \"food sticking\" and \"getting strangled on water\". Pt accepted trials of thin liquids and soft solids. Delayed cough w/ consecutive sips, continued report of sticking w/ po presentations. Pt is felt to most benefit from instrumental MBS w/ hirsch [facility] to further assess swallowing fnx. She is agreeable to participate. Will plan for MBS this pm (10/11/21 0930)              Plan of Care Reviewed With: patient  Progress: no change      SWALLOW EVALUATION (last 72 hours)     SLP Adult Swallow Evaluation     Row Name 10/11/21 0930       Rehab Evaluation    Document Type therapy note (daily note)  -    Subjective Information no complaints  -    Patient Observations alert; cooperative  -    Patient/Family/Caregiver Comments/Observations no family present  -    Care Plan Review evaluation/treatment results reviewed; care plan/treatment goals reviewed; patient/other agree to care plan  -    Care Plan Review, Other Participant(s) --    Patient Effort good  -    Symptoms Noted During/After Treatment none  -               Pain    Additional Documentation Pain Scale: FACES Pre/Post-Treatment (Group)  -            Pain Scale: FACES Pre/Post-Treatment    Pain: FACES Scale, Pretreatment 0-->no hurt  -CJ    Posttreatment Pain Rating 0-->no hurt  -CJ                    Clinical Impression    Daily Summary of Progress (SLP) progress toward functional goals as expected  -    SLP Swallowing Diagnosis R/O pharyngeal dysphagia  -    Functional Impact risk " "of aspiration/pneumonia  -    Rehab Potential/Prognosis, Swallowing good, to achieve stated therapy goals  -    Swallow Criteria for Skilled Therapeutic Interventions Met demonstrates skilled criteria  -    Plan for Continued Treatment (SLP) Pt seen for diet tolerance this am. Pt reports ongoing sensation of \"food sticking\" and \"getting strangled on water\". Pt accepted trials of thin liquids and soft solids. Delayed cough w/ consecutive sips, continued report of sticking w/ po presentations. Pt is felt to most benefit from instrumental MBS w/ [facility]sch [facility] to further assess swallowing fnx. She is agreeable to participate. Will plan for MBS this pm  -            Recommendations    Therapy Frequency (Swallow) --    Predicted Duration Therapy Intervention (Days) until discharge  -    SLP Diet Recommendation soft textures; thin liquids; other (see comments)  until [facility]  -    Recommended Diagnostics VFSS (MBS)  -    Recommended Precautions and Strategies upright posture during/after eating; small bites of food and sips of liquid; general aspiration precautions; fatigue precautions  -    Oral Care Recommendations Oral Care BID/PRN  -    SLP Rec. for Method of Medication Administration meds whole; with pudding or applesauce  -    Monitor for Signs of Aspiration yes; notify SLP if any concerns  -    Anticipated Discharge Disposition (SLP) unknown; anticipate therapy at next level of care  -          User Key  (r) = Recorded By, (t) = Taken By, (c) = Cosigned By    Initials Name Effective Dates    Serina Bain, MS CCC-SLP 06/16/21 -     Lida Dumont MS CCC-SLP 06/15/21 -                 EDUCATION  The patient has been educated in the following areas:   Dysphagia (Swallowing Impairment) Oral Care/Hydration.        SLP GOALS     Row Name 10/11/21 0930 10/09/21 1414          Oral Nutrition/Hydration Goal 1 (SLP)    Oral Nutrition/Hydration Goal 1, SLP LTG: Pt will tolerate mechanical soft diet with " "thin liquids without s/sx of aspiration with 100% accuracy  - LTG: Pt will tolerate mechanical soft diet with thin liquids without s/sx of aspiration with 100% accuracy  -     Time Frame (Oral Nutrition/Hydration Goal 1, SLP) by discharge  - by discharge  -     Progress/Outcomes (Oral Nutrition/Hydration Goal 1, SLP) continuing progress toward goal  - --            Oral Nutrition/Hydration Goal 2 (SLP)    Oral Nutrition/Hydration Goal 2, SLP STG:: Pt will tolerate mechanical soft diet with thin liquids without s/sx of aspiration with 100% accuracy  -CJ LTG: Pt will tolerate mechanical soft diet with thin liquids without s/sx of aspiration with 100% accuracy  -     Time Frame (Oral Nutrition/Hydration Goal 2, SLP) short term goal (STG)  - short term goal (STG); by discharge  -     Barriers (Oral Nutrition/Hydration Goal 2, SLP) Pt accepted trials of solids and thin liquids via all presentation styles. Delayed cough w/ conescutie sips of thin. Pt reporting ongoing sensation of food sticking. Pt reports \"frequent strangling\".Will plan for instrumental MBS today  - --     Progress/Outcomes (Oral Nutrition/Hydration Goal 2, SLP) continuing progress toward goal  - --           User Key  (r) = Recorded By, (t) = Taken By, (c) = Cosigned By    Initials Name Provider Type    Serina Bain MS CCC-SLP Speech and Language Pathologist    Lida Dumont MS CCC-SLP Speech and Language Pathologist                   Time Calculation:    Time Calculation- SLP     Row Name 10/11/21 1126             Time Calculation- SLP    SLP Start Time 0930  -      SLP Received On 10/11/21  -              Untimed Charges    [id number] Treatment Swallow Minutes 40  -CJ              Total Minutes    Untimed Charges Total Minutes 40  -CJ       Total Minutes 40  -CJ            User Key  (r) = Recorded By, (t) = Taken By, (c) = Cosigned By    Initials Name Provider Type    Serina Bain MS CCC-SLP Speech and Language " Pathologist                Therapy Charges for Today     Code Description Service Date Service Provider Modifiers Qty    [id number] HC ST TREATMENT SWALLOW 3 10/11/2021 Serina Jha, MS CCC-SLP GN 1        Patient was not wearing a face mask and did exhibit coughing during this therapy encounter.  Procedure performed was aerosolizing, involved close contact (within 6 feet for at least 15 minutes or longer), and did not involve contact with infectious secretions or specimens.  Therapist used appropriate personal protective equipment including gloves, standard procedure mask and eye protection.  Appropriate PPE was worn during the entire therapy session.  Hand hygiene was completed before and after therapy session.          Serina Jha MS CCC-SLP  10/11/2021

## 2021-10-11 NOTE — PLAN OF CARE
Goal Outcome Evaluation:  Plan of Care Reviewed With: patient        Progress: improving  Outcome Summary: Pt ambulated 175 feet total with RW and CGA (75 ft+100 ft) on 2.5 L supp O2. Sats > 95%. Will continue to progress as able.

## 2021-10-11 NOTE — DISCHARGE INSTR - DIET
MBS/VFSS Reason for Referral 10/11/2021  Patient was referred for a MBS to assess the efficiency of his/her swallow function, rule out aspiration and make recommendations regarding safe dietary consistencies, effective compensatory strategies, and safe eating environment.             Recommendations/Treatment  SLP Swallowing Diagnosis: mild, oral dysphagia, pharyngeal dysphagia (10/11/21 1150)  Functional Impact: risk of aspiration/pneumonia (10/11/21 1150)  Rehab Potential/Prognosis, Swallowing: good, to achieve stated therapy goals (10/11/21 1150)  Swallow Criteria for Skilled Therapeutic Interventions Met: demonstrates skilled criteria (10/11/21 1150)  Therapy Frequency (Swallow): 5 days per week (10/11/21 1150)  Predicted Duration Therapy Intervention (Days): until discharge (10/11/21 1150)  SLP Diet Recommendation: soft textures, chopped, thin liquids (10/11/21 1150)  Recommended Diagnostics: VFSS (MBS) (10/11/21 0930)  Recommended Precautions and Strategies: upright posture during/after eating, small bites of food and sips of liquid, general aspiration precautions, reflux precautions, fatigue precautions (10/11/21 1150)  SLP Rec. for Method of Medication Administration: meds whole, with pudding or applesauce, with thin liquids, as tolerated (10/11/21 1150)  Monitor for Signs of Aspiration: yes, notify SLP if any concerns (10/11/21 1150)  Anticipated Discharge Disposition (SLP): unknown, anticipate therapy at next level of care (10/11/21 1150)    Instrumental Set-up  Utensils Used: spoon, cup, straw (10/11/21 1150)  Consistencies Trialed: regular textures, thin liquids, pudding thick (10/11/21 1150)    Oral Preparation/ Oral Phase  Oral Prep Phase: impaired oral phase of swallowing (10/11/21 1150)  Oral Transit Phase: impaired (10/11/21 1150)  Oral Residue: WFL (10/11/21 1150)  Oral Preparatory Phase: prolonged manipulation (10/11/21 1150)  Prolonged Manipulation: regular textures, other (see comments)  (respiratory status) (10/11/21 1150)  Impaired Oral Transit Phase: increased A-P transit time, premature spillage of liquids into pharynx (10/11/21 1150)  Premature Spillage of Liquids into Pharynx: thin liquids, secondary to reduced lingual control (10/11/21 1150)       Pharyngeal Phase  Initiation of Pharyngeal Swallow: bolus in pyriform sinuses (10/11/21 1150)  Pharyngeal Phase: impaired pharyngeal phase of swallowing (10/11/21 1150)  Penetration During the Swallow: thin liquids, secondary to delayed swallow initiation or mistiming, secondary to reduced vestibular closure (10/11/21 1150)  Response to Penetration: deep, transient, reflexive response (10/11/21 1150)  Pharyngeal Residue: other (see comments) (no significant pharyngeal residue) (10/11/21 1150)  Attempted Compensatory Maneuvers: bolus size, bolus presentation style, throat clear after swallow, additional subsequent swallow (10/11/21 1150)  Response to Attempted Compensatory Maneuvers: did not prevent penetration (10/11/21 1150)  Pharyngeal Phase, Comment: Noted osteophyte at C5-C7 (10/11/21 1150)  VFSS Summary: [facility] completed this am. Mrs. Colon is positinoed upright and centered in vess chair to accept po presentations of pudding, solid cracker and thin liquids via spoon, cup and straw. Oral phase is mildly impaired w/ prolonged mastication of solids. Premature spillage of thin liquids to the pyriforms intermittently. Respiratory status felt to be contributing factor. Transient penetration w/ thin liquids via all presentation styles however cleared upon completion of swallow. Penetration was deeper w/ larger bolus and consecutive sips however continued to clear upon completion of swallow w/o significant residue. No other laryngeal penetration or aspiration observed across this evaluation. No significant pharyngeal residue impacting safety of swallowing fnx. Per this evaluation Mrs. Colon presents w/a mild oropharyngeal dysphagia. Will most benefit  from modified po diet of mechanical soft, chopped w/ thin liquids. Small single sips. Meds whole in puree/thins as tolerated. will benefit from dysphagia tx for general strengthening (10/11/21 1150)    Cervical Esophageal Phase  Esophageal Phase: see radiology report for further details (10/11/21 1150)

## 2021-10-11 NOTE — PROGRESS NOTES
NN spoke with pt at BS.  Pt alert and able to answer questions appropriately. Pt O2 sat  95% on  2.5 L currently, no home O2 use. COPD action plan reviewed. Deep breathing exercises encouraged. Exacerbation triggers reviewed.  No new concerns or questions voiced at this time.  NN will continue to follow as needed.       Per current GOLD Standards, please consider: No LAMA/LABA/ICS in place, Outpatient PFT, Pulmonary rehab as apporpiate, Outpatient pulmonary referral

## 2021-10-11 NOTE — PLAN OF CARE
Goal Outcome Evaluation:      A&O x4. VSS. Bipap used throughout the night. Pt reports feeling well rested and no shortness of breath. No further complaints at this time.

## 2021-10-11 NOTE — PLAN OF CARE
Goal Outcome Evaluation:  Plan of Care Reviewed With: patient        Progress: no change   SLP treatment completed. Will continue to address dysphagia and plan for MBS today. Please see note for further details and recommendations.

## 2021-10-11 NOTE — THERAPY TREATMENT NOTE
Patient Name: Pilar Colon  : 1945    MRN: [id number]                              Today's Date: 10/11/2021       Admit Date: 10/7/2021    Visit Dx:     ICD-10-CM ICD-9-CM   1. COPD exacerbation (HCC)  J44.1 491.21   2. Hypoxia  R09.02 799.02   3. Oropharyngeal dysphagia  R13.12 787.22     Patient Active Problem List   Diagnosis   • COPD with acute exacerbation (HCC)   • Anxiety disorder   • Depression   • HLD (hyperlipidemia)   • HTN (hypertension)   • Hypoxia   • COPD exacerbation (HCC)     Past Medical History:   Diagnosis Date   • Anemia    • Anxiety    • Arthritis    • Bronchitis    • COPD (chronic obstructive pulmonary disease) (HCC)    • Depression    • Diverticulitis    • Emphysema (subcutaneous) (surgical) resulting from a procedure    • History of left heart catheterization     STENT PLACED   • Hyperlipidemia    • Hypertension    • Low back pain    • Osteoporosis    • Pneumonia      Past Surgical History:   Procedure Laterality Date   • BREAST BIOPSY Right     [place], Dr. Severo Kay   • HAND SURGERY Left     Arthritis surgery, [place], [place]   • SHOULDER SURGERY Bilateral     [place]. Dr. Dixon   • TUBAL ABDOMINAL LIGATION        General Information     [facility] Name 10/11/21 [facility]          Physical Therapy Time and Intention    Document Type therapy note (daily note)  -     Mode of Treatment physical therapy  -     Row Name 10/11/21 [facility]          General Information    Patient Profile Reviewed yes  -     Existing Precautions/Restrictions fall; oxygen therapy device and L/min  -     Row Name 10/11/21 [facility]          Cognition    Orientation Status (Cognition) oriented x 3  -     Row Name 10/11/21 [facility]          Safety Issues, Functional Mobility    Safety Issues Affecting Function (Mobility) awareness of need for assistance; insight into deficits/self-awareness; judgment; problem-solving; safety precaution awareness; safety precautions follow-through/compliance; sequencing abilities  -      Impairments Affecting Function (Mobility) balance; endurance/activity tolerance; postural/trunk control; shortness of breath; strength  -           User Key  (r) = Recorded By, (t) = Taken By, (c) = Cosigned By    Initials Name Provider Type    Deejay Johnson PT Physical Therapist               Mobility     Row Name 10/11/21 1352          Bed Mobility    Bed Mobility supine-sit; scooting/bridging  -     Scooting/Bridging Slope (Bed Mobility) supervision; verbal cues  -     Supine-Sit Slope (Bed Mobility) supervision; verbal cues  -     Comment (Bed Mobility) Cues for safety, pt impulsive with mobility.  -     Row Name 10/11/21 1352          Transfers    Comment (Transfers) VCs for safe hand placement with use of RW  -     Row Name 10/11/21 1352          Sit-Stand Transfer    Sit-Stand Slope (Transfers) contact guard; supervision  -     Assistive Device (Sit-Stand Transfers) walker, front-wheeled  -     Row Name 10/11/21 1352          Gait/Stairs (Locomotion)    Slope Level (Gait) contact guard; verbal cues  -     Assistive Device (Gait) walker, front-wheeled  -     Distance in Feet (Gait) 175  -     Deviations/Abnormal Patterns (Gait) bilateral deviations; elliott decreased; gait speed decreased; stride length decreased  -     Bilateral Gait Deviations forward flexed posture; heel strike decreased  -     Comment (Gait/Stairs) Pt on 2.5 L supp O2. ambulated 75 feet with sitting rest break and sats at 95%. Then additional 100 feet and sats at 95%. Cues for directioning with use of RW and upright posture.  -           User Key  (r) = Recorded By, (t) = Taken By, (c) = Cosigned By    Initials Name Provider Type    Deejay Johnson PT Physical Therapist               Obj/Interventions     Row Name 10/11/21 1354          Balance    Balance Assessment sitting static balance; sitting dynamic balance; standing static balance; standing dynamic balance  -      Static Sitting Balance WFL  -     Dynamic Sitting Balance WFL  -     Static Standing Balance WFL  -     Dynamic Standing Balance mild impairment  -     Balance Interventions sitting; standing; sit to stand; supported; static; dynamic; minimal challenge  -     Comment, Balance RW for safety  -           User Key  (r) = Recorded By, (t) = Taken By, (c) = Cosigned By    Initials Name Provider Type     Deejay Trinidad, PT Physical Therapist               Goals/Plan    No documentation.                Clinical Impression     Row Name 10/11/21 [facility]          Pain    Additional Documentation Pain Scale: Numbers Pre/Post-Treatment (Group)  -JH     Row Name 10/11/21 [facility]          Pain Scale: Numbers Pre/Post-Treatment    Pretreatment Pain Rating 0/10 - no pain  -     Posttreatment Pain Rating 0/10 - no pain  -     Pain Intervention(s) Repositioned; Ambulation/increased activity  -[facility] 10/11/21 2417          Plan of Care Review    Plan of Care Reviewed With patient  -     Progress improving  -     Outcome Summary Pt ambulated 175 feet total with RW and CGA (75 ft+100 ft) on 2.5 L supp O2. Sats > 95%. Will continue to progress as able.  -[facility] 10/11/21 [facility]8          Therapy Assessment/Plan (PT)    Rehab Potential (PT) good, to achieve stated therapy goals  -     Criteria for Skilled Interventions Met (PT) yes; meets criteria; skilled treatment is necessary  -[facility] Name 10/11/21 1353          Vital Signs    Pre Systolic BP Rehab 116  -     Pre Treatment Diastolic BP 57  -     Pretreatment Heart Rate (beats/min) 91  -     Intratreatment Heart Rate (beats/min) 89  -     Posttreatment Heart Rate (beats/min) 82  -     Pre SpO2 (%) 94  -     O2 Delivery Pre Treatment nasal cannula  -     Intra SpO2 (%) 95  -     O2 Delivery Intra Treatment supplemental O2  -     Post SpO2 (%) 98  -     O2 Delivery Post Treatment nasal cannula  -     Pre Patient Position Supine   -     Intra Patient Position Standing  -     Post Patient Position Sitting  -     Row Name 10/11/21 1354          Positioning and Restraints    Pre-Treatment Position in bed  -     Post Treatment Position chair  -     In Chair notified nsg; reclined; call light within reach; encouraged to call for assist; exit alarm on; legs elevated; waffle cushion  -           User Key  (r) = Recorded By, (t) = Taken By, (c) = Cosigned By    Initials Name Provider Type    Deejay Johnson, PT Physical Therapist               Outcome Measures     Row Name 10/11/21 1355 10/11/21 0800       How much help from another person do you currently need...    Turning from your back to your side while in flat bed without using bedrails? 4  - 4  -CA    Moving from lying on back to sitting on the side of a flat bed without bedrails? 4  - 4  -CA    Moving to and from a bed to a chair (including a wheelchair)? 4  - 4  -CA    Standing up from a chair using your arms (e.g., wheelchair, bedside chair)? 4  - 4  -CA    Climbing 3-5 steps with a railing? 3  - 3  -CA    To walk in hospital room? 3  - 3  -CA    AM-PAC 6 Clicks Score (PT) 22  - 22  -CA    Row Name 10/11/21 1355          Functional Assessment    Outcome Measure Options AM-PAC 6 Clicks Basic Mobility (PT)  -           User Key  (r) = Recorded By, (t) = Taken By, (c) = Cosigned By    Initials Name Provider Type    Wilfred Hall, RN Registered Nurse    Deejay Johnson, PT Physical Therapist                             Physical Therapy Education                 Title: PT OT SLP Therapies (Done)     Topic: Physical Therapy (Done)     Point: Mobility training (Done)     Learning Progress Summary           Patient Acceptance, E,TB, VU by  at 10/11/2021 1357    Acceptance, E, VU,NR by  at 10/8/2021 1557    Comment: Educated pt. safety/technique with transfers, ambulation, benefits of rollator, benefits of acute rehab, PT POC   Family Acceptance, E, VU,NR by   at 10/8/2021 1557    Comment: Educated pt. safety/technique with transfers, ambulation, benefits of rollator, benefits of acute rehab, PT POC                   Point: Home exercise program (Done)     Learning Progress Summary           Patient Acceptance, E,TB, VU by  at 10/11/2021 1357                   Point: Body mechanics (Done)     Learning Progress Summary           Patient Acceptance, E,TB, VU by  at 10/11/2021 1357    Acceptance, E, VU,NR by  at 10/8/2021 1557    Comment: Educated pt. safety/technique with transfers, ambulation, benefits of rollator, benefits of acute rehab, PT POC   Family Acceptance, E, VU,NR by  at 10/8/2021 1557    Comment: Educated pt. safety/technique with transfers, ambulation, benefits of rollator, benefits of acute rehab, PT POC                   Point: Precautions (Done)     Learning Progress Summary           Patient Acceptance, E,TB, VU by  at 10/11/2021 1357    Acceptance, E, VU,NR by  at 10/8/2021 1557    Comment: Educated pt. safety/technique with transfers, ambulation, benefits of rollator, benefits of acute rehab, PT POC   Family Acceptance, E, VU,NR by  at 10/8/2021 1557    Comment: Educated pt. safety/technique with transfers, ambulation, benefits of rollator, benefits of acute rehab, PT POC                               User Key     Initials Effective Dates Name Provider Type Discipline     09/22/20 -  Deejay Trinidad, PT Physical Therapist PT     06/01/21 -  Ruth Garibay PT Physical Therapist PT              PT Recommendation and Plan     Plan of Care Reviewed With: patient  Progress: improving  Outcome Summary: Pt ambulated 175 feet total with RW and CGA (75 ft+100 ft) on 2.5 L supp O2. Sats > 95%. Will continue to progress as able.     Time Calculation:    PT Charges     Row Name 10/11/21 8667             Time Calculation    Start Time 1314  -      PT Received On 10/11/21  -      PT Goal Re-Cert Due Date 10/18/21  -              Time  Calculation- PT    Total Timed Code Minutes- PT 31 minute(s)  -              Timed Charges    59538 - PT Therapeutic Activity Minutes 31  -JH              Total Minutes    Timed Charges Total Minutes 31  -JH       Total Minutes 31  -JH            User Key  (r) = Recorded By, (t) = Taken By, (c) = Cosigned By    Initials Name Provider Type    Deejay Johnson, PT Physical Therapist              Therapy Charges for Today     Code Description Service Date Service Provider Modifiers Qty    [id number]  PT THERAPEUTIC ACT EA 15 MIN 10/11/2021 Deejay Trinidad, PT GP 2          PT G-Codes  Outcome Measure Options: AM-PAC 6 Clicks Basic Mobility (PT)  AM-PAC 6 Clicks Score (PT): 22    Deejay Trinidad PT  10/11/2021

## 2021-10-11 NOTE — PROGRESS NOTES
[facility] Medicine Services  PROGRESS NOTE    Patient Name: Pilar Colon  : 1945  MRN: [id number]    Date of Admission: 10/7/2021  Primary Care Physician: Omer Diaz MD    Subjective   Subjective     CC:  F/U COPD     HPI:  Patient seen and examined.  Nursing notes reviewed.  No acute events overnight.  Patient states she slept better with the BiPAP last night.  Speech following, plan for MBS today.    ROS:  Gen- No fevers, chills  CV- No chest pain, palpitations  Resp- +cough, dyspnea (improving)   GI- No N/V/D, abd pain    Objective   Objective     Vital Signs:   Temp:  [97.5 °F (36.4 °C)-98.4 °F (36.9 °C)] 98 °F (36.7 °C)  Heart Rate:  [69-85] 81  Resp:  [18-20] 18  BP: (110-139)/(57-76) 116/57  Flow (L/min):  [2-4] 2     Physical Exam:  Constitutional: No acute distress, awake, alert, chronically ill appearing   HENT: NCAT, mucous membranes moist  Respiratory: Diminished throughout, respiratory effort normal 2L NC  Cardiovascular: RRR, no murmurs, rubs, or gallops  Gastrointestinal: Positive bowel sounds, soft, nontender, nondistended  Musculoskeletal: No bilateral ankle edema  Psychiatric: Appropriate affect, cooperative  Neurologic: Oriented x 3,  speech clear  Skin: No rashes    Results Reviewed:  LAB RESULTS:      Lab 10/10/21  1039 10/10/21  0723 10/08/21  0658 10/07/21  1323   WBC  --  11.98* 5.85 13.94*   HEMOGLOBIN  --  11.7* 10.8* 11.5*   HEMATOCRIT  --  37.1 32.5* 35.8   PLATELETS  --  338 274 344   NEUTROS ABS  --   --  4.76 12.32*   IMMATURE GRANS (ABS)  --   --  0.05 0.10*   LYMPHS ABS  --   --  0.75 0.93   MONOS ABS  --   --  0.29 0.57   EOS ABS  --   --  0.00 0.01   MCV  --  89.8 87.1 88.8   PROCALCITONIN  --   --   --  0.05   D DIMER QUANT 0.86*  --   --   --          Lab 10/10/21  0723 10/08/21  0658 10/07/21  1323   SODIUM 138 140 144   POTASSIUM 5.2 3.6 3.9   CHLORIDE 102 101 105   CO2 23.0 30.0* 26.0   ANION GAP 13.0 9.0 13.0   BUN 30* 26* 23    CREATININE 0.85 0.84 0.94   GLUCOSE 185* 154* 131*   CALCIUM 8.9 8.7 9.4   MAGNESIUM  --   --  2.2         Lab 10/10/21  0723 10/07/21  1323   TOTAL PROTEIN 6.4 7.4   ALBUMIN 3.90 4.60   GLOBULIN 2.5 2.8   ALT (SGPT) 56* 19   AST (SGOT) 100* 36*   BILIRUBIN 0.3 0.3   ALK PHOS 50 61         Lab 10/07/21  1323   PROBNP 4,884.0*   TROPONIN T 0.012                 Lab 10/10/21  0728 10/07/21  2216   PH, ARTERIAL 7.362 7.438   PCO2, ARTERIAL 43.4 37.5   PO2 ART 75.2* 150.0*   FIO2 50 100   HCO3 ART 24.6 25.3   BASE EXCESS ART -0.9* 1.2   CARBOXYHEMOGLOBIN 0.4 0.3     Brief Urine Lab Results     None          Microbiology Results Abnormal     Procedure Component Value - Date/Time    COVID PRE-OP / PRE-PROCEDURE SCREENING ORDER (NO ISOLATION) - Swab, Nasopharynx [[id number]]  (Normal) Collected: 10/07/21 1518    Lab Status: Final result Specimen: Swab from Nasopharynx Updated: 10/07/21 1552    Narrative:      The following orders were created for panel order COVID PRE-OP / PRE-PROCEDURE SCREENING ORDER (NO ISOLATION) - Swab, Nasopharynx.  Procedure                               Abnormality         Status                     ---------                               -----------         ------                     COVID-19 and FLU A/B PCR...[[id number]]  Normal              Final result                 Please view results for these tests on the individual orders.    COVID-19 and FLU A/B PCR - Swab, Nasopharynx [[id number]]  (Normal) Collected: 10/07/21 1518    Lab Status: Final result Specimen: Swab from Nasopharynx Updated: 10/07/21 1552     COVID19 Not Detected     Influenza A PCR Not Detected     Influenza B PCR Not Detected    Narrative:      Fact sheet for providers: https://www.fda.gov/media/847473/download    Fact sheet for patients: https://www.fda.gov/media/868561/download    Test performed by PCR.          XR Chest 1 View    Result Date: 10/10/2021  CR Chest 1 Vw INDICATION: Shortness of air today. COPD. COMPARISON:   Chest 10/7/2021 FINDINGS: Portable AP view(s) of the chest. The heart and mediastinal contours are normal. Emphysema. The lungs are clear. No pneumothorax or pleural effusion.     Impression: Emphysema. No other acute chest findings. Signer Name: Johnathon Rey MD  Signed: 10/10/2021 8:00 AM  Workstation Name: [id number]  [facility]    CT Angiogram Chest    Result Date: 10/11/2021  EXAMINATION: CT ANGIOGRAM CHEST- 10/10/2021  INDICATION: hypoxia/elevated d dimer; J44.1-Chronic obstructive pulmonary disease with (acute) exacerbation; R09.02-Hypoxemia  TECHNIQUE: CT angiogram chest with intravenous contrast administration following PE protocol. 2-D reconstructions performed.  The radiation dose reduction device was turned on for each scan per the ALARA (As Low as Reasonably Achievable) protocol.  COMPARISON: NONE  FINDINGS: Thyroid unremarkable and homogeneous. No bulky mediastinal adenopathy. Central airways are patent. Esophagus in normal course and caliber. Atherosclerotic thoracic aorta with limited evaluation due to technique however at least mild to moderate intramural thrombus formation versus ulcerative plaque formation along the anterior distal aspect and mid descending posterior aspect with peripheral calcific disease however no aneurysmal component. Satisfactory opacification and contrast bolus timing of the pulmonary arterial tree without filling defect to suggest pulmonary embolus. Cardiac size within normal limits and without pericardial effusion demonstrating coronary calcifications. Extended lung windows reveal left-sided fat-containing Bochdalek hernia with adjacent atelectasis or scarring. No consolidation or effusion. Degenerative changes of the spine without acute osseous findings chest wall or partially visualized upper abdominal segments. Simple appearing right hepatic cyst.      Impression: 1. No PE.  2. Atherosclerotic thoracic aorta with limited evaluation due to  technique however at least mild to moderate intramural thrombus formation versus ulcerative plaque formation along the anterior distal aspect and mid descending posterior aspect with peripheral calcific disease however no aneurysmal component.  3. No acute intrathoracic process of airspace disease or effusion with a left-sided Bochdalek hernia at the left lung base.  D: 10/10/2021 E: 10/11/2021   This report was finalized on 10/11/2021 10:06 AM by Dr. Bijan Willams.            I have reviewed the medications:  Scheduled Meds:albuterol, , ,   aspirin, 325 mg, Oral, Daily  budesonide-formoterol, 2 puff, Inhalation, BID - RT  busPIRone, 10 mg, Oral, Daily  dextromethorphan polistirex ER, 60 mg, Oral, Q12H  dilTIAZem CD, 240 mg, Oral, Q24H  doxycycline, 100 mg, Oral, Q12H  gabapentin, 300 mg, Oral, Q8H  guaiFENesin, 1,200 mg, Oral, Q12H  heparin (porcine), 5,000 Units, Subcutaneous, Q12H  ipratropium-albuterol, 3 mL, Nebulization, Q4H - RT  losartan, 50 mg, Oral, Daily  meclizine, 25 mg, Oral, BID  methylPREDNISolone sodium succinate, 60 mg, Intravenous, Q12H  pantoprazole, 40 mg, Oral, Daily  PARoxetine, 40 mg, Oral, Daily  pharmacy consult - MTM, , Does not apply, Daily  pravastatin, 80 mg, Oral, Nightly  sodium chloride, 10 mL, Intravenous, Q12H  zolpidem, 5 mg, Oral, Nightly      Continuous Infusions:   PRN Meds:.•  acetaminophen **OR** acetaminophen **OR** acetaminophen  •  benzonatate  •  senna-docusate sodium **AND** polyethylene glycol **AND** bisacodyl **AND** bisacodyl  •  HYDROcodone-acetaminophen  •  ipratropium  •  LORazepam  •  LORazepam  •  ondansetron **OR** ondansetron  •  potassium chloride **OR** potassium chloride **OR** potassium chloride  •  sodium chloride    Assessment/Plan   Assessment & Plan     Active Hospital Problems    Diagnosis  POA   • COPD with acute exacerbation (HCC) [J44.1]  Unknown   • Anxiety disorder [F41.9]  Yes   • Depression [F32.A]  Yes   • HLD (hyperlipidemia) [E78.5]  Yes   •  "HTN (hypertension) [I10]  Yes   • Hypoxia [R09.02]  Yes   • COPD exacerbation (HCC) [J44.1]  Yes      Resolved Hospital Problems   No resolved problems to display.        Brief Hospital Course to date:  Pilar Colon is a 76 y.o. female lifelong smoker (quit a week ago) with COPD, CAD, HTN HL here with COPD exacerbation.    This patient's problems and plans were partially entered by my partner and updated as appropriate by me 10/11/21.  All problems are new to me today     COPD with exac  Acute hypoxic respiratory failure  --Currently on 2 L nasal cannula  --Continue Symbicort twice daily  --Continue Delsym twice daily  --Continue doxycycline twice daily for a total of 7 days  --Continue Mucinex twice daily  --Continue scheduled duo nebs  --Patient states she slept better with BiPAP last night.  Discussed with case management, patient unlikely to get BiPAP but possible trilogy candidate.  She will follow-up today.  --We will give a prescription for anxiety medication at discharge as this likely contributes to her shortness of breath  --CTA yesterday negative for PE.     Leukocytosis   -- Suspect due to steroid use. No evidence active infection.      Anxiety  Depression  -- Cont buspar and paxil      CAD  HLD  HTN  -- Controlled, cont statin, ASA, cozaar and cardizem      Chronic back pain  -- Cont norco and decreased Neurontin dose based on CrCl     Tobacco Use  -- NRT offered. Patient refused as she \"has quit.\"    Mild oral dysphagia, pharyngeal dysphagia  -Status post SLP eval, see diuretics  -MBS today    DVT prophylaxis:  Medical DVT prophylaxis orders are present.       AM-PAC 6 Clicks Score (PT): 22 (10/11/21 0800)    Disposition: I expect the patient to be discharged home tomorrow. Outpatient pulmonary referral.     CODE STATUS:   Code Status and Medical Interventions:   Ordered at: 10/08/21 1314     Code Status:    CPR     Medical Interventions (Level of Support Prior to Arrest):    Mirna GARCIA" DO Ravindra  10/11/21

## 2021-10-12 VITALS
OXYGEN SATURATION: 96 % | TEMPERATURE: 97.8 F | WEIGHT: 114.6 LBS | HEART RATE: 74 BPM | HEIGHT: 60 IN | BODY MASS INDEX: 22.5 KG/M2 | RESPIRATION RATE: 16 BRPM | DIASTOLIC BLOOD PRESSURE: 83 MMHG | SYSTOLIC BLOOD PRESSURE: 138 MMHG

## 2021-10-12 PROBLEM — R09.02 HYPOXIA: Status: RESOLVED | Noted: 2021-10-07 | Resolved: 2021-10-12

## 2021-10-12 PROBLEM — J44.1 COPD EXACERBATION: Status: RESOLVED | Noted: 2021-10-07 | Resolved: 2021-10-12

## 2021-10-12 PROBLEM — J44.1 COPD WITH ACUTE EXACERBATION: Status: RESOLVED | Noted: 2021-10-07 | Resolved: 2021-10-12

## 2021-10-12 PROCEDURE — 94799 UNLISTED PULMONARY SVC/PX: CPT

## 2021-10-12 PROCEDURE — 99239 HOSP IP/OBS DSCHRG MGMT >30: CPT | Performed by: FAMILY MEDICINE

## 2021-10-12 PROCEDURE — 25010000002 HEPARIN (PORCINE) PER 1000 UNITS: Performed by: NURSE PRACTITIONER

## 2021-10-12 PROCEDURE — 25010000002 METHYLPREDNISOLONE PER 125 MG: Performed by: INTERNAL MEDICINE

## 2021-10-12 RX ORDER — GABAPENTIN 300 MG/1
300 CAPSULE ORAL EVERY 8 HOURS SCHEDULED
Qty: 9 CAPSULE | Refills: 0 | Status: SHIPPED | OUTPATIENT
Start: 2021-10-12 | End: 2022-08-18

## 2021-10-12 RX ORDER — BUDESONIDE 0.5 MG/2ML
0.5 INHALANT ORAL
Qty: 60 ML | Refills: 1 | Status: SHIPPED | OUTPATIENT
Start: 2021-10-12

## 2021-10-12 RX ORDER — IPRATROPIUM BROMIDE AND ALBUTEROL SULFATE 2.5; .5 MG/3ML; MG/3ML
3 SOLUTION RESPIRATORY (INHALATION) EVERY 4 HOURS PRN
Qty: 360 ML | Refills: 0 | Status: SHIPPED | OUTPATIENT
Start: 2021-10-12

## 2021-10-12 RX ORDER — LORAZEPAM 0.5 MG/1
0.5 TABLET ORAL EVERY 6 HOURS PRN
Qty: 12 TABLET | Refills: 0 | Status: SHIPPED | OUTPATIENT
Start: 2021-10-12 | End: 2021-10-15

## 2021-10-12 RX ORDER — GUAIFENESIN 600 MG/1
1200 TABLET, EXTENDED RELEASE ORAL EVERY 12 HOURS SCHEDULED
Qty: 120 TABLET | Refills: 0 | Status: SHIPPED | OUTPATIENT
Start: 2021-10-12

## 2021-10-12 RX ORDER — DEXTROMETHORPHAN POLISTIREX 30 MG/5ML
60 SUSPENSION ORAL EVERY 12 HOURS SCHEDULED
Qty: 280 ML | Refills: 0 | Status: SHIPPED | OUTPATIENT
Start: 2021-10-12 | End: 2022-08-18

## 2021-10-12 RX ORDER — DOXYCYCLINE 100 MG/1
100 CAPSULE ORAL EVERY 12 HOURS SCHEDULED
Qty: 5 CAPSULE | Refills: 0 | Status: SHIPPED | OUTPATIENT
Start: 2021-10-12 | End: 2021-10-15

## 2021-10-12 RX ORDER — PREDNISONE 10 MG/1
TABLET ORAL
Qty: 63 TABLET | Refills: 0 | Status: SHIPPED | OUTPATIENT
Start: 2021-10-12 | End: 2021-10-30

## 2021-10-12 RX ORDER — BENZONATATE 200 MG/1
200 CAPSULE ORAL 3 TIMES DAILY PRN
Qty: 90 CAPSULE | Refills: 0 | Status: SHIPPED | OUTPATIENT
Start: 2021-10-12 | End: 2022-08-18

## 2021-10-12 RX ADMIN — IPRATROPIUM BROMIDE AND ALBUTEROL SULFATE 3 ML: 2.5; .5 SOLUTION RESPIRATORY (INHALATION) at 07:16

## 2021-10-12 RX ADMIN — ASPIRIN 325 MG ORAL TABLET 325 MG: 325 PILL ORAL at 08:17

## 2021-10-12 RX ADMIN — DOXYCYCLINE 100 MG: 100 CAPSULE ORAL at 08:17

## 2021-10-12 RX ADMIN — BUSPIRONE HYDROCHLORIDE 10 MG: 10 TABLET ORAL at 08:17

## 2021-10-12 RX ADMIN — BUDESONIDE AND FORMOTEROL FUMARATE DIHYDRATE 2 PUFF: 80; 4.5 AEROSOL RESPIRATORY (INHALATION) at 07:16

## 2021-10-12 RX ADMIN — PAROXETINE HYDROCHLORIDE 40 MG: 20 TABLET, FILM COATED ORAL at 08:17

## 2021-10-12 RX ADMIN — IPRATROPIUM BROMIDE AND ALBUTEROL SULFATE 3 ML: 2.5; .5 SOLUTION RESPIRATORY (INHALATION) at 12:27

## 2021-10-12 RX ADMIN — SODIUM CHLORIDE, PRESERVATIVE FREE 10 ML: 5 INJECTION INTRAVENOUS at 08:15

## 2021-10-12 RX ADMIN — PANTOPRAZOLE SODIUM 40 MG: 40 TABLET, DELAYED RELEASE ORAL at 08:17

## 2021-10-12 RX ADMIN — METHYLPREDNISOLONE SODIUM SUCCINATE 60 MG: 125 INJECTION, POWDER, FOR SOLUTION INTRAMUSCULAR; INTRAVENOUS at 06:34

## 2021-10-12 RX ADMIN — LOSARTAN POTASSIUM 50 MG: 50 TABLET, FILM COATED ORAL at 08:17

## 2021-10-12 RX ADMIN — DILTIAZEM HYDROCHLORIDE 240 MG: 240 CAPSULE, COATED, EXTENDED RELEASE ORAL at 08:17

## 2021-10-12 RX ADMIN — Medication 60 MG: at 08:17

## 2021-10-12 RX ADMIN — MECLIZINE 25 MG: 12.5 TABLET ORAL at 08:18

## 2021-10-12 RX ADMIN — HEPARIN SODIUM 5000 UNITS: 5000 INJECTION, SOLUTION INTRAVENOUS; SUBCUTANEOUS at 08:17

## 2021-10-12 RX ADMIN — GABAPENTIN 300 MG: 300 CAPSULE ORAL at 06:34

## 2021-10-12 RX ADMIN — GUAIFENESIN 1200 MG: 600 TABLET, EXTENDED RELEASE ORAL at 08:18

## 2021-10-12 NOTE — CASE MANAGEMENT/SOCIAL WORK
Case Management Discharge Note      Final Note: Patient's plan is home.  Home O2 arranged through [employer].  Portable tank at bedside.  No other needs noted.  Zoila Kwok, RN [phone]         Selected Continued Care - Admitted Since 10/7/2021     Destination    No services have been selected for the patient.              Durable Medical Equipment     Service Provider Selected Services Address Phone Fax Patient Preferred    [facility] [place]  Durable Medical Equipment [address], [facility] [address] [phone] --          Dialysis/Infusion    No services have been selected for the patient.              Home Medical Care    No services have been selected for the patient.              Therapy    No services have been selected for the patient.              Community Resources    No services have been selected for the patient.              Community & DME    No services have been selected for the patient.                       Final Discharge Disposition Code: 01 - home or self-care

## 2021-10-12 NOTE — DISCHARGE SUMMARY
"    [facility] Medicine Services  DISCHARGE SUMMARY    Patient Name: Pilar Colon  : 1945  MRN: [id number]    Date of Admission: 10/7/2021  1:14 PM  Date of Discharge:  10/12/2021  Primary Care Physician: Omer Diaz MD    Consults     No orders found from 2021 to 10/8/2021.          Hospital Course     Presenting Problem:   COPD exacerbation (HCC) [J44.1]    Active Hospital Problems    Diagnosis  POA   • Anxiety disorder [F41.9]  Yes   • Depression [F32.A]  Yes   • HLD (hyperlipidemia) [E78.5]  Yes   • HTN (hypertension) [I10]  Yes      Resolved Hospital Problems    Diagnosis Date Resolved POA   • COPD with acute exacerbation (HCC) [J44.1] 10/12/2021 Unknown   • Hypoxia [R09.02] 10/12/2021 Yes   • COPD exacerbation (HCC) [J44.1] 10/12/2021 Yes          Hospital Course:  Pilar Colon is a 76 y.o. female  lifelong smoker (quit a week ago) with COPD, CAD, HTN HL here with COPD exacerbation.    COPD with exac  Acute hypoxic respiratory failure  --Currently on 2 L nasal cannula  --Symbicort not on formulary, changed to Pulmicort nebs   --Continue Delsym twice daily  --Continue doxycycline twice daily for a total of 7 days  --Continue Mucinex twice daily  --Continue scheduled duo nebs  --Patient states she slept better with BiPAP, asks for one at home, does not have a qualifying ABG. Discussed with patient, she will need to follow-up with her PCP for outpatient sleep study.   --We will give a prescription for anxiety medication at discharge as this likely contributes to her shortness of breath     Leukocytosis   -- Suspect due to steroid use. No evidence active infection.      Anxiety  Depression  -- Cont buspar and paxil      CAD  HLD  HTN  -- Controlled, cont statin, ASA, cozaar and cardizem      Chronic back pain  -- Cont norco and decreased Neurontin dose based on CrCl     Tobacco Use  -- NRT offered. Patient refused as she \"has quit.\"     Mild oral dysphagia, pharyngeal " dysphagia  -Status post SLP eval/MBS-see diet recs    Discharge Follow Up Recommendations for outpatient labs/diagnostics:  -PCP 3-5 days  -COPD NN to arrange Pulmonary follow-up    Day of Discharge     HPI:   Patient seen and examined. RN notes reviewed. No acute events overnight. Pt doing well, no current issues. Agreeable to DC home today with home O2.     Review of Systems  Gen- No fevers, chills  CV- No chest pain, palpitations  Resp- +cough, dyspnea  GI- No N/V/D, abd pain    Vital Signs:   Temp:  [97.8 °F (36.6 °C)-98.5 °F (36.9 °C)] 97.8 °F (36.6 °C)  Heart Rate:  [70-86] 78  Resp:  [16-20] 16  BP: (113-140)/(57-76) 140/76     Physical Exam:  Constitutional: No acute distress, awake, alert, appears older than stated age, chronically ill appearing   HENT: NCAT, mucous membranes moist  Respiratory: Decreased in bases, respiratory effort normal 2L NC  Cardiovascular: RRR, no murmurs, rubs, or gallops  Gastrointestinal: Positive bowel sounds, soft, nontender, nondistended  Musculoskeletal: No bilateral ankle edema  Psychiatric: Appropriate affect, cooperative  Neurologic: Oriented x 3, speech clear  Skin: No rashes    Pertinent  and/or Most Recent Results     LAB RESULTS:      Lab 10/10/21  1039 10/10/21  0723 10/08/21  0658 10/07/21  1323   WBC  --  11.98* 5.85 13.94*   HEMOGLOBIN  --  11.7* 10.8* 11.5*   HEMATOCRIT  --  37.1 32.5* 35.8   PLATELETS  --  338 274 344   NEUTROS ABS  --   --  4.76 12.32*   IMMATURE GRANS (ABS)  --   --  0.05 0.10*   LYMPHS ABS  --   --  0.75 0.93   MONOS ABS  --   --  0.29 0.57   EOS ABS  --   --  0.00 0.01   MCV  --  89.8 87.1 88.8   PROCALCITONIN  --   --   --  0.05   D DIMER QUANT 0.86*  --   --   --          Lab 10/10/21  0723 10/08/21  0658 10/07/21  1323   SODIUM 138 140 144   POTASSIUM 5.2 3.6 3.9   CHLORIDE 102 101 105   CO2 23.0 30.0* 26.0   ANION GAP 13.0 9.0 13.0   BUN 30* 26* 23   CREATININE 0.85 0.84 0.94   GLUCOSE 185* 154* 131*   CALCIUM 8.9 8.7 9.4   MAGNESIUM  --    --  2.2         Lab 10/10/21  0723 10/07/21  1323   TOTAL PROTEIN 6.4 7.4   ALBUMIN 3.90 4.60   GLOBULIN 2.5 2.8   ALT (SGPT) 56* 19   AST (SGOT) 100* 36*   BILIRUBIN 0.3 0.3   ALK PHOS 50 61         Lab 10/07/21  1323   PROBNP 4,884.0*   TROPONIN T 0.012                 Lab 10/10/21  0728 10/07/21  2216   PH, ARTERIAL 7.362 7.438   PCO2, ARTERIAL 43.4 37.5   PO2 ART 75.2* 150.0*   FIO2 50 100   HCO3 ART 24.6 25.3   BASE EXCESS ART -0.9* 1.2   CARBOXYHEMOGLOBIN 0.4 0.3     Brief Urine Lab Results     None        Microbiology Results (last 10 days)     Procedure Component Value - Date/Time    COVID PRE-OP / PRE-PROCEDURE SCREENING ORDER (NO ISOLATION) - Swab, Nasopharynx [[id number]]  (Normal) Collected: 10/07/21 1518    Lab Status: Final result Specimen: Swab from Nasopharynx Updated: 10/07/21 1552    Narrative:      The following orders were created for panel order COVID PRE-OP / PRE-PROCEDURE SCREENING ORDER (NO ISOLATION) - Swab, Nasopharynx.  Procedure                               Abnormality         Status                     ---------                               -----------         ------                     COVID-19 and FLU A/B PCR...[[id number]]  Normal              Final result                 Please view results for these tests on the individual orders.    COVID-19 and FLU A/B PCR - Swab, Nasopharynx [[id number]]  (Normal) Collected: 10/07/21 1518    Lab Status: Final result Specimen: Swab from Nasopharynx Updated: 10/07/21 1552     COVID19 Not Detected     Influenza A PCR Not Detected     Influenza B PCR Not Detected    Narrative:      Fact sheet for providers: https://www.fda.gov/media/698003/download    Fact sheet for patients: https://www.fda.gov/media/633589/download    Test performed by PCR.          FL Video Swallow With Speech Single Contrast    Result Date: 10/11/2021  EXAMINATION: FL VIDEO SWALLOW W SPEECH SINGLE-CONTRAST-, FL LIMITED UGI FOR MBS REFLUX SINGLE-CONTRAST-  INDICATION: dysphagia;  J44.1-Chronic obstructive pulmonary disease with (acute) exacerbation; R09.02-Hypoxemia  TECHNIQUE: 1 minute of fluoroscopic time was used for this exam. 7 associated fluoroscopic loops were saved. The patient was evaluated in the seated lateral position while taking a variety of consistencies of barium by mouth under the direction of speech pathology.  COMPARISON: NONE  FINDINGS: 1 minute of fluoroscopy provided for a modified barium swallow, and limited upper GI series. Please see speech therapy report for full details and recommendations. Limited upper GI series demonstrated mild smooth luminal narrowing of the distal esophagus, which may represent stricture, or spasm. Luminal narrowing measures approximately 5 mm in diameter. Further evaluation such as endoscopy may be considered if clinically relevant. There was mild esophageal dysmotility. There was mild gastroesophageal reflux to the level of the thoracic inlet.       Modified barium swallow: Fluoroscopy provided for a modified barium swallow. Please see speech therapy report for full details and recommendations.  Limited upper GI series: 1. Mild smooth luminal narrowing of the distal esophagus which may represent stricture, or spasm. Please consider endoscopy if clinically relevant 2. Mild esophageal dysmotility 3. Mild gastroesophageal reflux to the level of the thoracic inlet        XR Chest 1 View    Result Date: 10/10/2021  CR Chest 1 Vw INDICATION: Shortness of air today. COPD. COMPARISON:  Chest 10/7/2021 FINDINGS: Portable AP view(s) of the chest. The heart and mediastinal contours are normal. Emphysema. The lungs are clear. No pneumothorax or pleural effusion.     Emphysema. No other acute chest findings. Signer Name: Johnathon Rey MD  Signed: 10/10/2021 8:00 AM  Workstation Name: [email or web address]  [facility]    XR Chest 1 View    Result Date: 10/7/2021  EXAMINATION: XR CHEST 1 VW-10/07/2021:  INDICATION: SOA, triage protocol.   COMPARISON: NONE.  FINDINGS: AP view of the chest reveals cardiac size borderline enlarged without overt edema. Chronic hyperinflated appearance of obstructive pulmonary disease without pneumothorax or pleural effusion.      Findings of COPD without focal consolidation. Potential prominence of perihilar lung markings may represent bronchitis without effusion.  D:  10/07/2021 E:  10/07/2021  This report was finalized on 10/7/2021 5:00 PM by Dr. Bijan Willams.      FL Limited Ugi For Mbs Reflux Single-Contrast    Result Date: 10/11/2021  EXAMINATION: FL VIDEO SWALLOW W SPEECH SINGLE-CONTRAST-, FL LIMITED UGI FOR MBS REFLUX SINGLE-CONTRAST-  INDICATION: dysphagia; J44.1-Chronic obstructive pulmonary disease with (acute) exacerbation; R09.02-Hypoxemia  TECHNIQUE: 1 minute of fluoroscopic time was used for this exam. 7 associated fluoroscopic loops were saved. The patient was evaluated in the seated lateral position while taking a variety of consistencies of barium by mouth under the direction of speech pathology.  COMPARISON: NONE  FINDINGS: 1 minute of fluoroscopy provided for a modified barium swallow, and limited upper GI series. Please see speech therapy report for full details and recommendations. Limited upper GI series demonstrated mild smooth luminal narrowing of the distal esophagus, which may represent stricture, or spasm. Luminal narrowing measures approximately 5 mm in diameter. Further evaluation such as endoscopy may be considered if clinically relevant. There was mild esophageal dysmotility. There was mild gastroesophageal reflux to the level of the thoracic inlet.       Modified barium swallow: Fluoroscopy provided for a modified barium swallow. Please see speech therapy report for full details and recommendations.  Limited upper GI series: 1. Mild smooth luminal narrowing of the distal esophagus which may represent stricture, or spasm. Please consider endoscopy if clinically relevant 2. Mild esophageal  dysmotility 3. Mild gastroesophageal reflux to the level of the thoracic inlet        CT Angiogram Chest    Result Date: 10/11/2021  EXAMINATION: CT ANGIOGRAM CHEST- 10/10/2021  INDICATION: hypoxia/elevated d dimer; J44.1-Chronic obstructive pulmonary disease with (acute) exacerbation; R09.02-Hypoxemia  TECHNIQUE: CT angiogram chest with intravenous contrast administration following PE protocol. 2-D reconstructions performed.  The radiation dose reduction device was turned on for each scan per the ALARA (As Low as Reasonably Achievable) protocol.  COMPARISON: NONE  FINDINGS: Thyroid unremarkable and homogeneous. No bulky mediastinal adenopathy. Central airways are patent. Esophagus in normal course and caliber. Atherosclerotic thoracic aorta with limited evaluation due to technique however at least mild to moderate intramural thrombus formation versus ulcerative plaque formation along the anterior distal aspect and mid descending posterior aspect with peripheral calcific disease however no aneurysmal component. Satisfactory opacification and contrast bolus timing of the pulmonary arterial tree without filling defect to suggest pulmonary embolus. Cardiac size within normal limits and without pericardial effusion demonstrating coronary calcifications. Extended lung windows reveal left-sided fat-containing Bochdalek hernia with adjacent atelectasis or scarring. No consolidation or effusion. Degenerative changes of the spine without acute osseous findings chest wall or partially visualized upper abdominal segments. Simple appearing right hepatic cyst.      1. No PE.  2. Atherosclerotic thoracic aorta with limited evaluation due to technique however at least mild to moderate intramural thrombus formation versus ulcerative plaque formation along the anterior distal aspect and mid descending posterior aspect with peripheral calcific disease however no aneurysmal component.  3. No acute intrathoracic process of airspace  disease or effusion with a left-sided Bochdalek hernia at the left lung base.  D: 10/10/2021 E: 10/11/2021   This report was finalized on 10/11/2021 10:06 AM by Dr. Bijan Willams.                    Plan for Follow-up of Pending Labs/Results: PCP    Discharge Details        Discharge Medications      New Medications      Instructions Start Date   budesonide 0.5 MG/2ML nebulizer solution  Commonly known as: PULMICORT   0.5 mg, Nebulization, Daily - RT      dextromethorphan polistirex ER 30 MG/5ML Suspension Extended Release oral suspension  Commonly known as: DELSYM   60 mg, Oral, Every 12 Hours Scheduled      doxycycline 100 MG capsule  Commonly known as: MONODOX   100 mg, Oral, Every 12 Hours Scheduled      guaiFENesin 600 MG 12 hr tablet  Commonly known as: MUCINEX   1,200 mg, Oral, Every 12 Hours Scheduled      LORazepam 0.5 MG tablet  Commonly known as: ATIVAN   0.5 mg, Oral, Every 6 Hours PRN         Changes to Medications      Instructions Start Date   benzonatate 200 MG capsule  Commonly known as: TESSALON  What changed:   medication strength  how much to take  additional instructions   200 mg, Oral, 3 Times Daily PRN      gabapentin 300 MG capsule  Commonly known as: NEURONTIN  What changed:   medication strength  how much to take  when to take this   300 mg, Oral, Every 8 Hours Scheduled      predniSONE 10 MG tablet  Commonly known as: DELTASONE  What changed:   how to take this  additional instructions   60mg x 3 days. 50mg x 3 days, 40mg x 3 days, 30mg x 3 days, 20mg x 3 days, 10mg x 3 days then stop.         Continue These Medications      Instructions Start Date   aspirin 325 MG tablet   325 mg, Oral, Daily, OTC      busPIRone 10 MG tablet  Commonly known as: BUSPAR   10 mg, Oral, 2 times daily      dilTIAZem 240 MG 24 hr capsule  Commonly known as: TIAZAC   240 mg, Oral, Daily      HYDROcodone-acetaminophen  MG per tablet  Commonly known as: NORCO   TAKE 1 TABLET BY MOUTH EVERY 6 HOURS. DO NOT  FILL ANY EARLIER THAN 30 DAYS      ipratropium-albuterol 0.5-2.5 mg/3 ml nebulizer  Commonly known as: DUO-NEB   3 mL, Nebulization, Every 4 Hours PRN      losartan 50 MG tablet  Commonly known as: COZAAR   50 mg, Oral, Daily      meclizine 25 MG tablet  Commonly known as: ANTIVERT   25 mg, Oral, 3 Times Daily PRN      omeprazole 40 MG capsule  Commonly known as: priLOSEC   40 mg, Oral, Daily      PARoxetine 40 MG tablet  Commonly known as: PAXIL   40 mg, Oral, Every Morning      pravastatin 80 MG tablet  Commonly known as: PRAVACHOL   80 mg, Oral, Nightly         Stop These Medications    levoFLOXacin 250 MG tablet  Commonly known as: LEVAQUIN            Allergies   Allergen Reactions   • Tramadol Swelling         Discharge Disposition:  Home or Self Care    Diet:  Hospital:  Diet Order   Procedures   • Diet Soft Texture; Chopped; Thin; Cardiac       Activity:      Restrictions or Other Recommendations:       CODE STATUS:    Code Status and Medical Interventions:   Ordered at: 10/08/21 1314     Code Status:    CPR     Medical Interventions (Level of Support Prior to Arrest):    Full       No future appointments.    Additional Instructions for the Follow-ups that You Need to Schedule     Discharge Follow-up with PCP   As directed       Currently Documented PCP:    Omer Diaz MD    PCP Phone Number:    [phone]     Follow Up Details: 3-5 days         Discharge Follow-up with Specified Provider: COPD NN to arrange Pulmonary follow-up   As directed      To: COPD NN to arrange Pulmonary follow-up                     Savana Waite DO  10/12/21      Time Spent on Discharge:  I spent  45  minutes on this discharge activity which included: face-to-face encounter with the patient, reviewing the data in the system, coordination of the care with the nursing staff as well as consultants, documentation, and entering orders.

## 2021-10-12 NOTE — PROGRESS NOTES
UPDATE: 12:21 pm   NN spoke with Isabella at Dr. Diaz office to seek referral to Dr. Glasgow, pulmonologist office.  The patient has been informed that she needs to ask about this referral when she goes to her hospital follow up on Oct. 18, 2021, particularly if she has not heard about an appointment from the pulmonology office.           NN spoke with patient at .  Patient alert and able to answer questions appropriately.  O2 sat 97% on 2.5L, home baseline is 2L.  NN has received verbal order to refer patient to ambulatory pulmonology.  Patient requests that she be referred to Dr. Afia Glasgow in [place].  Nn has called and left a message with this office for referral and has not received a call back as of this time.  Deep breathing exercises and COPD action plan reviewed.  No new questions or concerns voiced at this time.  NN continues to follow.        Per current [employer] Standards, please consider: No LAMA/LABA/ICS in place, Outpatient PFT, Pulmonary rehab as apporpiate, Outpatient pulmonary referral

## 2021-10-13 ENCOUNTER — READMISSION MANAGEMENT (OUTPATIENT)
Dept: CALL CENTER | Facility: HOSPITAL | Age: 76
End: 2021-10-13

## 2021-10-13 NOTE — OUTREACH NOTE
Prep Survey      Responses   [facility] facility patient discharged from? [place]   Is LACE score < 7 ? No   Emergency Room discharge w/ pulse ox? No   Eligibility Readm Mgmt   Discharge diagnosis COPD   Does the patient have one of the following disease processes/diagnoses(primary or secondary)? COPD/Pneumonia   Does the patient have Home health ordered? No   Is there a DME ordered? Yes   What DME was ordered? Oxygen   Comments regarding appointments Appts needed   Prep survey completed? Yes          Sabrina Alvarenga RN

## 2021-10-15 ENCOUNTER — READMISSION MANAGEMENT (OUTPATIENT)
Dept: CALL CENTER | Facility: HOSPITAL | Age: 76
End: 2021-10-15

## 2021-10-15 NOTE — OUTREACH NOTE
COPD/PN Week 1 Survey      Responses   [facility] patient discharged from? [place]   Does the patient have one of the following disease processes/diagnoses(primary or secondary)? COPD/Pneumonia   Was the primary reason for admission: COPD exacerbation   Week 1 attempt successful? No   Unsuccessful attempts Attempt 1          Velma Alexander RN

## 2021-10-19 ENCOUNTER — READMISSION MANAGEMENT (OUTPATIENT)
Dept: CALL CENTER | Facility: HOSPITAL | Age: 76
End: 2021-10-19

## 2021-10-19 NOTE — OUTREACH NOTE
COPD/PN Week 1 Survey      Responses   [facility] patient discharged from? [place]   Does the patient have one of the following disease processes/diagnoses(primary or secondary)? COPD/Pneumonia   Was the primary reason for admission: COPD exacerbation   Week 1 attempt successful? No   Unsuccessful attempts Attempt 1          Avis Cope RN

## 2021-10-21 ENCOUNTER — READMISSION MANAGEMENT (OUTPATIENT)
Dept: CALL CENTER | Facility: HOSPITAL | Age: 76
End: 2021-10-21

## 2021-10-21 NOTE — OUTREACH NOTE
COPD/PN Week 1 Survey      Responses   [facility] patient discharged from? [place]   Does the patient have one of the following disease processes/diagnoses(primary or secondary)? COPD/Pneumonia   Was the primary reason for admission: COPD exacerbation   Week 1 attempt successful? No   Unsuccessful attempts Attempt 2          Savana Hoffmann RN

## 2022-03-22 ENCOUNTER — TELEPHONE (OUTPATIENT)
Dept: FAMILY MEDICINE CLINIC | Facility: CLINIC | Age: 77
End: 2022-03-22

## 2022-03-22 NOTE — TELEPHONE ENCOUNTER
Radha called me this morning and states that grandmother has been admitted to [facility] for double Pneumonia, could not breath has COPD

## 2022-04-15 RX ORDER — PAROXETINE HYDROCHLORIDE 40 MG/1
40 TABLET, FILM COATED ORAL EVERY MORNING
Qty: 90 TABLET | Refills: 0 | Status: SHIPPED | OUTPATIENT
Start: 2022-04-15 | End: 2022-07-01 | Stop reason: SDUPTHER

## 2022-04-15 NOTE — TELEPHONE ENCOUNTER
Rx Refill Note  Requested Prescriptions     Pending Prescriptions Disp Refills   • PARoxetine (PAXIL) 40 MG tablet 30 tablet 0     Sig: Take 1 tablet by mouth Every Morning.      Last office visit with prescribing clinician: Visit date not found      Next office visit with prescribing clinician: 5/3/2022  Please add Last Relevant Lab Date if appropriate:23}    Is Refill Pharmacy correct?yes  Isaiah Mueller Rep  04/15/22, 11:38 EDT

## 2022-06-13 ENCOUNTER — TELEPHONE (OUTPATIENT)
Dept: FAMILY MEDICINE CLINIC | Facility: CLINIC | Age: 77
End: 2022-06-13

## 2022-06-13 DIAGNOSIS — Z87.891 PERSONAL HISTORY OF TOBACCO USE, PRESENTING HAZARDS TO HEALTH: Primary | ICD-10-CM

## 2022-07-01 DIAGNOSIS — I10 ESSENTIAL HYPERTENSION, BENIGN: Primary | ICD-10-CM

## 2022-07-01 RX ORDER — PRAVASTATIN SODIUM 80 MG/1
80 TABLET ORAL NIGHTLY
Qty: 90 TABLET | Refills: 0 | Status: SHIPPED | OUTPATIENT
Start: 2022-07-01 | End: 2022-08-18 | Stop reason: DRUGHIGH

## 2022-07-01 RX ORDER — MECLIZINE HYDROCHLORIDE 25 MG/1
25 TABLET ORAL 3 TIMES DAILY PRN
Qty: 90 TABLET | Refills: 0 | Status: SHIPPED | OUTPATIENT
Start: 2022-07-01 | End: 2022-07-22 | Stop reason: SDUPTHER

## 2022-07-01 RX ORDER — BUSPIRONE HYDROCHLORIDE 10 MG/1
10 TABLET ORAL 2 TIMES DAILY
Qty: 180 TABLET | Refills: 0 | Status: SHIPPED | OUTPATIENT
Start: 2022-07-01 | End: 2022-11-30 | Stop reason: SDUPTHER

## 2022-07-01 RX ORDER — LOSARTAN POTASSIUM 50 MG/1
50 TABLET ORAL DAILY
Qty: 90 TABLET | Refills: 0 | Status: SHIPPED | OUTPATIENT
Start: 2022-07-01 | End: 2022-11-30 | Stop reason: SDUPTHER

## 2022-07-01 RX ORDER — PAROXETINE HYDROCHLORIDE 40 MG/1
40 TABLET, FILM COATED ORAL EVERY MORNING
Qty: 90 TABLET | Refills: 0 | Status: SHIPPED | OUTPATIENT
Start: 2022-07-01 | End: 2022-07-22 | Stop reason: SDUPTHER

## 2022-07-01 RX ORDER — DILTIAZEM HYDROCHLORIDE 240 MG/1
240 CAPSULE, EXTENDED RELEASE ORAL DAILY
Qty: 30 CAPSULE | Refills: 0 | Status: SHIPPED | OUTPATIENT
Start: 2022-07-01 | End: 2022-07-05

## 2022-07-01 NOTE — TELEPHONE ENCOUNTER
Rx Refill Note  Requested Prescriptions     Pending Prescriptions Disp Refills   • losartan (COZAAR) 50 MG tablet 90 tablet 0     Sig: Take 1 tablet by mouth Daily.   • busPIRone (BUSPAR) 10 MG tablet 180 tablet 0     Sig: Take 1 tablet by mouth 2 (Two) Times a Day.   • meclizine (ANTIVERT) 25 MG tablet 90 tablet 0     Sig: Take 1 tablet by mouth 3 (Three) Times a Day As Needed for Dizziness or Nausea.   • PARoxetine (PAXIL) 40 MG tablet 90 tablet 0     Sig: Take 1 tablet by mouth Every Morning.   • pravastatin (PRAVACHOL) 80 MG tablet 90 tablet 0     Sig: Take 1 tablet by mouth Every Night.   • dilTIAZem (TIAZAC) 240 MG 24 hr capsule 30 capsule 0     Sig: Take 1 capsule by mouth Daily.      Last office visit with prescribing clinician: 11/18/2021      Next office visit with prescribing clinician: Visit date not found   Please add Last Relevant Lab Date if appropriate:23}   Is Refill Pharmacy correct?Yes  Isaiah Mueller Rep  07/01/22, 14:40 EDT

## 2022-07-05 RX ORDER — DILTIAZEM HYDROCHLORIDE 240 MG/1
240 CAPSULE, EXTENDED RELEASE ORAL DAILY
Qty: 90 CAPSULE | Refills: 0 | Status: SHIPPED | OUTPATIENT
Start: 2022-07-05 | End: 2022-09-27 | Stop reason: SDUPTHER

## 2022-07-22 ENCOUNTER — TELEPHONE (OUTPATIENT)
Dept: FAMILY MEDICINE CLINIC | Facility: CLINIC | Age: 77
End: 2022-07-22

## 2022-07-22 RX ORDER — PAROXETINE HYDROCHLORIDE 40 MG/1
40 TABLET, FILM COATED ORAL EVERY MORNING
Qty: 90 TABLET | Refills: 0 | Status: SHIPPED | OUTPATIENT
Start: 2022-07-22 | End: 2023-03-17 | Stop reason: SDUPTHER

## 2022-07-22 RX ORDER — MECLIZINE HYDROCHLORIDE 25 MG/1
25 TABLET ORAL 3 TIMES DAILY PRN
Qty: 90 TABLET | Refills: 0 | Status: SHIPPED | OUTPATIENT
Start: 2022-07-22 | End: 2022-09-15 | Stop reason: SDUPTHER

## 2022-07-22 NOTE — TELEPHONE ENCOUNTER
Rx Refill Note    Requested Prescriptions     Pending Prescriptions Disp Refills   • meclizine (ANTIVERT) 25 MG tablet 90 tablet 0     Sig: Take 1 tablet by mouth 3 (Three) Times a Day As Needed for Dizziness or Nausea.   • PARoxetine (PAXIL) 40 MG tablet 90 tablet 0     Sig: Take 1 tablet by mouth Every Morning.        Last office visit with prescribing clinician: Via "[employer]" 11/18/2021      Next office visit with prescribing clinician: 7/22/2022   Last labs:   Last refill: Please send to [facility] as it is cheaper for the patient   Pharmacy (be sure to add in Epic). correct

## 2022-07-22 NOTE — TELEPHONE ENCOUNTER
Caller: ColonMiquelma FRANKO    Relationship: Self    Best call back number:  [phone]     Requested Prescriptions:   Requested Prescriptions     Pending Prescriptions Disp Refills   • meclizine (ANTIVERT) 25 MG tablet 90 tablet 0     Sig: Take 1 tablet by mouth 3 (Three) Times a Day As Needed for Dizziness or Nausea.   • PARoxetine (PAXIL) 40 MG tablet 90 tablet 0     Sig: Take 1 tablet by mouth Every Morning.        Pharmacy where request should be sent: [facility] (NOW [facility]) - [address] - [phone]  - [phone] FX     Additional details provided by patient:  PATIENT IS REQUESTING THESE MEDICATIONS TO GO TO THE [facility]IL [facility]    SHE SAID IT WOULD BE CHEAPER AND SHE CANNOT AFFORD TO PICK THEM UIP FROM [facility]     Does the patient have less than a 3 day supply:  [x] Yes  [] No

## 2022-07-22 NOTE — TELEPHONE ENCOUNTER
Caller: Pilar Colon    Relationship: Self    Best call back number: [phone]      What orders are you requesting (i.e. lab or imaging):  ORDER FOR LEG SCAN     In what timeframe would the patient need to come in:  Monday 7-25-22    Where will you receive your lab/imaging services:     Additional notes: PATIENT STATES SHE HAS A LUNG CT SCAN ON Monday 7-25-22 AND WHILE SHE IS THERE SHE WOULD LIKE TO GET A LEG SCAN ALSO     SHE SAID HER LEGS HAVE GOTTEN REAL BAD AND THEY SWELLING     ALSO PATIENT SAID SHE HAS OSTEOPOROSIS  AND HER LEGS AND HIPS ARE REALLY BAD   SHE IS REQUESTING A MEDICATION FOR THIS     [facility] CONFIRMED

## 2022-08-12 ENCOUNTER — TELEPHONE (OUTPATIENT)
Dept: FAMILY MEDICINE CLINIC | Facility: CLINIC | Age: 77
End: 2022-08-12

## 2022-08-16 ENCOUNTER — OFFICE VISIT (OUTPATIENT)
Dept: FAMILY MEDICINE CLINIC | Facility: CLINIC | Age: 77
End: 2022-08-16

## 2022-08-16 VITALS
RESPIRATION RATE: 14 BRPM | SYSTOLIC BLOOD PRESSURE: 120 MMHG | TEMPERATURE: 98.4 F | WEIGHT: 124.4 LBS | HEIGHT: 60 IN | DIASTOLIC BLOOD PRESSURE: 70 MMHG | BODY MASS INDEX: 24.42 KG/M2 | OXYGEN SATURATION: 96 % | HEART RATE: 70 BPM

## 2022-08-16 DIAGNOSIS — M25.552 BILATERAL HIP PAIN: ICD-10-CM

## 2022-08-16 DIAGNOSIS — J43.9 PULMONARY EMPHYSEMA, UNSPECIFIED EMPHYSEMA TYPE: ICD-10-CM

## 2022-08-16 DIAGNOSIS — F32.A DEPRESSION, UNSPECIFIED DEPRESSION TYPE: ICD-10-CM

## 2022-08-16 DIAGNOSIS — I10 PRIMARY HYPERTENSION: ICD-10-CM

## 2022-08-16 DIAGNOSIS — I10 HYPERTENSION, ESSENTIAL: Primary | ICD-10-CM

## 2022-08-16 DIAGNOSIS — M25.551 BILATERAL HIP PAIN: ICD-10-CM

## 2022-08-16 DIAGNOSIS — M54.50 LOW BACK PAIN WITHOUT SCIATICA, UNSPECIFIED BACK PAIN LATERALITY, UNSPECIFIED CHRONICITY: ICD-10-CM

## 2022-08-16 DIAGNOSIS — K21.9 GASTROESOPHAGEAL REFLUX DISEASE WITHOUT ESOPHAGITIS: ICD-10-CM

## 2022-08-16 DIAGNOSIS — J44.1 COPD EXACERBATION: ICD-10-CM

## 2022-08-16 PROCEDURE — 36415 COLL VENOUS BLD VENIPUNCTURE: CPT | Performed by: FAMILY MEDICINE

## 2022-08-16 PROCEDURE — 99214 OFFICE O/P EST MOD 30 MIN: CPT | Performed by: FAMILY MEDICINE

## 2022-08-16 RX ORDER — BUSPIRONE HYDROCHLORIDE 10 MG/1
1 TABLET ORAL EVERY 12 HOURS
COMMUNITY
Start: 2022-02-24 | End: 2022-08-16 | Stop reason: SDUPTHER

## 2022-08-16 RX ORDER — OMEPRAZOLE 40 MG/1
1 CAPSULE, DELAYED RELEASE ORAL
COMMUNITY
Start: 2022-02-24 | End: 2022-08-16 | Stop reason: SDUPTHER

## 2022-08-16 RX ORDER — MELOXICAM 15 MG/1
1 TABLET ORAL DAILY
COMMUNITY
Start: 2022-03-03 | End: 2022-08-18

## 2022-08-16 RX ORDER — LOSARTAN POTASSIUM 50 MG/1
1 TABLET ORAL DAILY
COMMUNITY
Start: 2022-02-24 | End: 2022-08-16 | Stop reason: SDUPTHER

## 2022-08-16 RX ORDER — GABAPENTIN 400 MG/1
400 CAPSULE ORAL 3 TIMES DAILY
COMMUNITY
Start: 2022-08-10

## 2022-08-16 RX ORDER — OMEPRAZOLE 40 MG/1
40 CAPSULE, DELAYED RELEASE ORAL DAILY
Qty: 90 CAPSULE | Refills: 1 | Status: SHIPPED | OUTPATIENT
Start: 2022-08-16 | End: 2022-11-30 | Stop reason: SDUPTHER

## 2022-08-16 RX ORDER — MECLIZINE HYDROCHLORIDE 25 MG/1
TABLET ORAL
COMMUNITY
Start: 2022-07-01 | End: 2022-08-16 | Stop reason: SDUPTHER

## 2022-08-16 RX ORDER — FUROSEMIDE 20 MG/1
1 TABLET ORAL DAILY PRN
COMMUNITY
Start: 2022-02-24 | End: 2023-01-10 | Stop reason: SDUPTHER

## 2022-08-16 RX ORDER — BACLOFEN 10 MG/1
TABLET ORAL
COMMUNITY
Start: 2022-05-18 | End: 2022-08-18

## 2022-08-16 NOTE — PROGRESS NOTES
Patient Name: Pilar Colon  : 1945   MRN: [id number]     Chief Complaint:    Chief Complaint   Patient presents with   • Back Pain     Lower back pain, both hips right being the worse, and legs pain x 2 to 3 days, the pain is so bad it makes pt tremble and shake, has had chronic back pain since        History of Present Illness: Pilar Colon is a 76 y.o. female who is here today for follow up.  HPI        Review of Systems:   Review of Systems   Constitutional: Negative.    HENT: Negative.    Eyes: Negative.    Respiratory: Negative.    Cardiovascular: Negative.    Gastrointestinal: Negative.    Neurological: Negative.         Past Medical History:   Past Medical History:   Diagnosis Date   • Anemia    • Anxiety    • Arthritis    • Asthma    • Bronchitis    • Chronic bronchitis with pulmonary emphysema (HCC) 2015   • COPD mixed type (HCC)    • Coronary arteriosclerosis in native artery 2010   • Depression    • Diverticulitis    • Dupuytren contracture    • Emphysema (subcutaneous) (surgical) resulting from a procedure    • High risk medication use    • History of degenerative disc disease     spine   • History of left heart catheterization     STENT PLACED   • Hx of adenomatous colonic polyps    • Hyperlipidemia    • Hypertension 2014   • Low back pain    • Mixed hyperlipidemia 2014   • Osteoporosis    • Paroxysmal A-fib ([facility])    • Peripheral polyneuropathy    • Personal history of nicotine dependence    • Pneumonia    • Recurrent major depressive disorder in partial remission ([facility])    • Stress incontinence    • Vertigo    • Vitamin D deficiency        Past Surgical History:   Past Surgical History:   Procedure Laterality Date   • ARTERY SURGERY      STENT LAD   • BREAST BIOPSY Right     [facility], Dr. Severo Kay   • BREAST LUMPECTOMY     • BREAST LUMPECTOMY Left    • HAND SURGERY Left     Arthritis surgery, [place], [place]   • SHOULDER SURGERY Bilateral      [facility]. Dr. Dixon   • SHOULDER SURGERY Bilateral    • TUBAL ABDOMINAL LIGATION         Family History:   Family History   Problem Relation Age of Onset   • Liver cancer Father    • Diabetes Sister 53   • Heart disease Sister 59        Undefined       Social History:   Social History     Socioeconomic History   • Marital status: Single   Tobacco Use   • Smoking status: Former Smoker     Packs/day: 0.50     Years: 20.00     Pack years: 10.00     Start date:      Quit date:      Years since quittin.6   • Smokeless tobacco: Never Used   Vaping Use   • Vaping Use: Never used   Substance and Sexual Activity   • Alcohol use: Yes     Comment: 1 Pint per week, SATURDAY NIGHT LAST DRINK    • Drug use: Never   • Sexual activity: Defer       Medications:     Current Outpatient Medications:   •  aspirin 325 MG tablet, Take 325 mg by mouth Daily. OTC, Disp: , Rfl:   •  benzonatate (TESSALON) 200 MG capsule, Take 1 capsule by mouth 3 (Three) Times a Day As Needed for Cough., Disp: 90 capsule, Rfl: 0  •  budesonide (PULMICORT) 0.5 MG/2ML nebulizer solution, Use 1 nebule (2mL) by nebulization Daily., Disp: 60 mL, Rfl: 1  •  busPIRone (BUSPAR) 10 MG tablet, Take 1 tablet by mouth 2 (Two) Times a Day., Disp: 180 tablet, Rfl: 0  •  dextromethorphan polistirex ER (DELSYM) 30 MG/5ML Suspension Extended Release oral suspension, Take 10 mL by mouth Every 12 (Twelve) Hours., Disp: 280 mL, Rfl: 0  •  dilTIAZem (TIAZAC) 240 MG 24 hr capsule, TAKE 1 CAPSULE BY MOUTH DAILY, Disp: 90 capsule, Rfl: 0  •  furosemide (Lasix) 20 MG tablet, Take 1 tablet by mouth Daily As Needed., Disp: , Rfl:   •  gabapentin (NEURONTIN) 300 MG capsule, Take 1 capsule by mouth Every 8 (Eight) Hours., Disp: 9 capsule, Rfl: 0  •  guaiFENesin (MUCINEX) 600 MG 12 hr tablet, Take 2 tablets by mouth Every 12 (Twelve) Hours., Disp: 120 tablet, Rfl: 0  •  HYDROcodone-acetaminophen (NORCO)  MG per tablet, TAKE 1 TABLET BY MOUTH EVERY 6 HOURS. DO NOT  "FILL ANY EARLIER THAN 30 DAYS, Disp: , Rfl:   •  ipratropium-albuterol (DUO-NEB) 0.5-2.5 mg/3 ml nebulizer, Take 3 mL by nebulization Every 4 (Four) Hours As Needed for Wheezing or Shortness of Air., Disp: 360 mL, Rfl: 0  •  losartan (COZAAR) 50 MG tablet, Take 1 tablet by mouth Daily., Disp: 90 tablet, Rfl: 0  •  meclizine (ANTIVERT) 25 MG tablet, Take 1 tablet by mouth 3 (Three) Times a Day As Needed for Dizziness or Nausea., Disp: 90 tablet, Rfl: 0  •  meloxicam (MOBIC) 15 MG tablet, Take 1 tablet by mouth Daily., Disp: , Rfl:   •  omeprazole (priLOSEC) 40 MG capsule, Take 1 capsule by mouth Daily., Disp: 90 capsule, Rfl: 1  •  PARoxetine (PAXIL) 40 MG tablet, Take 1 tablet by mouth Every Morning., Disp: 90 tablet, Rfl: 0  •  pravastatin (PRAVACHOL) 80 MG tablet, Take 1 tablet by mouth Every Night., Disp: 90 tablet, Rfl: 0  •  baclofen (LIORESAL) 10 MG tablet, , Disp: , Rfl:   •  gabapentin (NEURONTIN) 400 MG capsule, Take 400 mg by mouth 3 (Three) Times a Day., Disp: , Rfl:     Allergies:   Allergies   Allergen Reactions   • Tramadol Swelling         Physical Exam:  Vital Signs:   Vitals:    08/16/22 1320   BP: 120/70   BP Location: Left arm   Patient Position: Sitting   Cuff Size: Adult   Pulse: 70   Resp: 14   Temp: 98.4 °F (36.9 °C)   TempSrc: Temporal   SpO2: 96%   Weight: 56.4 kg (124 lb 6.4 oz)   Height: 152.4 cm (60\")   PainSc: 10-Worst pain ever   PainLoc: Back  Comment: back , hip and legs     Body mass index is 24.3 kg/m².     Physical Exam  Vitals and nursing note reviewed.   Constitutional:       Appearance: Normal appearance. She is normal weight.   HENT:      Head: Normocephalic and atraumatic.      Right Ear: Tympanic membrane, ear canal and external ear normal.      Left Ear: Tympanic membrane, ear canal and external ear normal.      Nose: Nose normal.      Mouth/Throat:      Mouth: Mucous membranes are dry.      Pharynx: Oropharynx is clear.   Eyes:      Extraocular Movements: Extraocular " movements intact.      Conjunctiva/sclera: Conjunctivae normal.      Pupils: Pupils are equal, round, and reactive to light.   Cardiovascular:      Rate and Rhythm: Normal rate and regular rhythm.      Pulses: Normal pulses.      Heart sounds: Normal heart sounds.   Pulmonary:      Effort: Pulmonary effort is normal.      Breath sounds: Normal breath sounds.   Musculoskeletal:      Cervical back: Normal range of motion and neck supple.   Feet:      Comments:      Neurological:      Mental Status: She is alert.         Procedures      Assessment/Plan:   Diagnoses and all orders for this visit:    1. Hypertension, essential (Primary)  Assessment & Plan:  Discussed with patient to monitor their blood pressure and if systolic blood pressure goes above 140 or diastolic is above 90 to return to clinic.  Take medicines as directed, call for any problems, patient not having or any worrisome symptoms.        Orders:  -     XR Hips Bilateral With or Without Pelvis 2 View; Future  -     CBC & Differential; Future  -     Lipid Panel; Future  -     Comprehensive Metabolic Panel; Future  -     TSH Rfx On Abnormal To Free T4; Future    2. Pulmonary emphysema, unspecified emphysema type (HCC)  Assessment & Plan:  Stable.  She has had her COVID-vaccine.  She needs a booster.      Orders:  -     XR Hips Bilateral With or Without Pelvis 2 View; Future  -     CBC & Differential; Future  -     Lipid Panel; Future  -     Comprehensive Metabolic Panel; Future  -     TSH Rfx On Abnormal To Free T4; Future    3. Gastroesophageal reflux disease without esophagitis  -     omeprazole (priLOSEC) 40 MG capsule; Take 1 capsule by mouth Daily.  Dispense: 90 capsule; Refill: 1  -     XR Hips Bilateral With or Without Pelvis 2 View; Future  -     CBC & Differential; Future  -     Lipid Panel; Future  -     Comprehensive Metabolic Panel; Future  -     TSH Rfx On Abnormal To Free T4; Future    4. Primary hypertension  Assessment & Plan:  Discussed  with patient to monitor their blood pressure and if systolic blood pressure goes above 140 or diastolic is above 90 to return to clinic.  Take medicines as directed, call for any problems, patient not having or any worrisome symptoms.        Orders:  -     XR Hips Bilateral With or Without Pelvis 2 View; Future  -     CBC & Differential; Future  -     Lipid Panel; Future  -     Comprehensive Metabolic Panel; Future  -     TSH Rfx On Abnormal To Free T4; Future    5. Depression, unspecified depression type  Assessment & Plan:  Patient is depressed because her son is using drugs.  She cannot stop them.    Orders:  -     XR Hips Bilateral With or Without Pelvis 2 View; Future  -     CBC & Differential; Future  -     Lipid Panel; Future  -     Comprehensive Metabolic Panel; Future  -     TSH Rfx On Abnormal To Free T4; Future    6. Low back pain without sciatica, unspecified back pain laterality, unspecified chronicity  Assessment & Plan:  Going to a pain clinic.  She is getting hydrocodone 3 times a day.  I am going to check a CMP today and if renal functions okay we will put her on some NSAIDs.    Orders:  -     XR Hips Bilateral With or Without Pelvis 2 View; Future  -     CBC & Differential; Future  -     Lipid Panel; Future  -     Comprehensive Metabolic Panel; Future  -     TSH Rfx On Abnormal To Free T4; Future    7. COPD exacerbation (HCC)  -     XR Hips Bilateral With or Without Pelvis 2 View; Future  -     CBC & Differential; Future  -     Lipid Panel; Future  -     Comprehensive Metabolic Panel; Future  -     TSH Rfx On Abnormal To Free T4; Future    8. Bilateral hip pain  Assessment & Plan:  We will x-ray both hips.    Orders:  -     XR Hips Bilateral With or Without Pelvis 2 View; Future  -     CBC & Differential; Future  -     Lipid Panel; Future  -     Comprehensive Metabolic Panel; Future  -     TSH Rfx On Abnormal To Free T4; Future           Follow Up:   Return in about 3 months (around  11/16/2022).    Omer Diaz MD  [facility]

## 2022-08-16 NOTE — ASSESSMENT & PLAN NOTE
Going to a pain clinic.  She is getting hydrocodone 3 times a day.  I am going to check a CMP today and if renal functions okay we will put her on some NSAIDs.

## 2022-08-17 LAB
ALBUMIN SERPL-MCNC: NORMAL G/DL
ALP SERPL-CCNC: NORMAL U/L
ALT SERPL-CCNC: NORMAL U/L
AST SERPL-CCNC: NORMAL U/L
BASOPHILS # BLD AUTO: 0.1 X10E3/UL (ref 0–0.2)
BASOPHILS NFR BLD AUTO: 1 %
BILIRUB SERPL-MCNC: NORMAL MG/DL
BUN SERPL-MCNC: NORMAL MG/DL
CALCIUM SERPL-MCNC: NORMAL MG/DL
CHLORIDE SERPL-SCNC: NORMAL MMOL/L
CHOLEST SERPL-MCNC: NORMAL MG/DL
CO2 SERPL-SCNC: NORMAL MMOL/L
CREAT SERPL-MCNC: NORMAL MG/DL
EOSINOPHIL # BLD AUTO: 0.2 X10E3/UL (ref 0–0.4)
EOSINOPHIL NFR BLD AUTO: 2 %
ERYTHROCYTE [DISTWIDTH] IN BLOOD BY AUTOMATED COUNT: 13.5 % (ref 11.7–15.4)
GLUCOSE SERPL-MCNC: NORMAL MG/DL
HCT VFR BLD AUTO: 38.4 % (ref 34–46.6)
HDLC SERPL-MCNC: NORMAL MG/DL
HGB BLD-MCNC: 12.6 G/DL (ref 11.1–15.9)
IMM GRANULOCYTES # BLD AUTO: 0 X10E3/UL (ref 0–0.1)
IMM GRANULOCYTES NFR BLD AUTO: 0 %
LYMPHOCYTES # BLD AUTO: 3.1 X10E3/UL (ref 0.7–3.1)
LYMPHOCYTES NFR BLD AUTO: 43 %
MCH RBC QN AUTO: 28.2 PG (ref 26.6–33)
MCHC RBC AUTO-ENTMCNC: 32.8 G/DL (ref 31.5–35.7)
MCV RBC AUTO: 86 FL (ref 79–97)
MONOCYTES # BLD AUTO: 0.7 X10E3/UL (ref 0.1–0.9)
MONOCYTES NFR BLD AUTO: 10 %
NEUTROPHILS # BLD AUTO: 3.2 X10E3/UL (ref 1.4–7)
NEUTROPHILS NFR BLD AUTO: 44 %
PLATELET # BLD AUTO: 312 X10E3/UL (ref 150–450)
POTASSIUM SERPL-SCNC: NORMAL MMOL/L
PROT SERPL-MCNC: NORMAL G/DL
RBC # BLD AUTO: 4.47 X10E6/UL (ref 3.77–5.28)
SODIUM SERPL-SCNC: NORMAL MMOL/L
SPECIMEN STATUS: NORMAL
TRIGL SERPL-MCNC: NORMAL MG/DL
TSH SERPL DL<=0.005 MIU/L-ACNC: 0.91 UIU/ML (ref 0.45–4.5)
VLDLC SERPL CALC-MCNC: NORMAL MG/DL
WBC # BLD AUTO: 7.3 X10E3/UL (ref 3.4–10.8)

## 2022-08-18 ENCOUNTER — OFFICE VISIT (OUTPATIENT)
Dept: CARDIOLOGY | Facility: CLINIC | Age: 77
End: 2022-08-18

## 2022-08-18 VITALS
HEART RATE: 77 BPM | WEIGHT: 124 LBS | HEIGHT: 60 IN | DIASTOLIC BLOOD PRESSURE: 66 MMHG | OXYGEN SATURATION: 99 % | SYSTOLIC BLOOD PRESSURE: 114 MMHG | RESPIRATION RATE: 16 BRPM | BODY MASS INDEX: 24.35 KG/M2 | TEMPERATURE: 98 F

## 2022-08-18 DIAGNOSIS — I25.118 CORONARY ARTERY DISEASE OF NATIVE ARTERY OF NATIVE HEART WITH STABLE ANGINA PECTORIS: Primary | ICD-10-CM

## 2022-08-18 DIAGNOSIS — E78.00 PURE HYPERCHOLESTEROLEMIA: ICD-10-CM

## 2022-08-18 DIAGNOSIS — J43.9 PULMONARY EMPHYSEMA, UNSPECIFIED EMPHYSEMA TYPE: ICD-10-CM

## 2022-08-18 DIAGNOSIS — I48.0 PAROXYSMAL ATRIAL FIBRILLATION: ICD-10-CM

## 2022-08-18 DIAGNOSIS — I10 HYPERTENSION, ESSENTIAL: ICD-10-CM

## 2022-08-18 PROBLEM — I25.10 CORONARY ARTERY DISEASE INVOLVING NATIVE CORONARY ARTERY OF NATIVE HEART: Status: ACTIVE | Noted: 2022-08-18

## 2022-08-18 LAB
ALBUMIN SERPL-MCNC: 4.6 G/DL (ref 3.7–4.7)
ALBUMIN/GLOB SERPL: 1.8 {RATIO} (ref 1.2–2.2)
ALP SERPL-CCNC: 87 IU/L (ref 44–121)
ALT SERPL-CCNC: 9 IU/L (ref 0–32)
AST SERPL-CCNC: 23 IU/L (ref 0–40)
BILIRUB SERPL-MCNC: 0.2 MG/DL (ref 0–1.2)
BUN SERPL-MCNC: 15 MG/DL (ref 8–27)
BUN/CREAT SERPL: 13 (ref 12–28)
CALCIUM SERPL-MCNC: 9.9 MG/DL (ref 8.7–10.3)
CHLORIDE SERPL-SCNC: 100 MMOL/L (ref 96–106)
CHOLEST SERPL-MCNC: 242 MG/DL (ref 100–199)
CO2 SERPL-SCNC: 20 MMOL/L (ref 20–29)
CREAT SERPL-MCNC: 1.19 MG/DL (ref 0.57–1)
EGFRCR-CYS SERPLBLD CKD-EPI 2021: 47 ML/MIN/1.73
GLOBULIN SER CALC-MCNC: 2.5 G/DL (ref 1.5–4.5)
GLUCOSE SERPL-MCNC: 96 MG/DL (ref 65–99)
HDLC SERPL-MCNC: 59 MG/DL
LDLC SERPL CALC-MCNC: 143 MG/DL (ref 0–99)
POTASSIUM SERPL-SCNC: 4.8 MMOL/L (ref 3.5–5.2)
PROT SERPL-MCNC: 7.1 G/DL (ref 6–8.5)
SODIUM SERPL-SCNC: 140 MMOL/L (ref 134–144)
TRIGL SERPL-MCNC: 221 MG/DL (ref 0–149)
VLDLC SERPL CALC-MCNC: 40 MG/DL (ref 5–40)

## 2022-08-18 PROCEDURE — 99204 OFFICE O/P NEW MOD 45 MIN: CPT | Performed by: INTERNAL MEDICINE

## 2022-08-18 PROCEDURE — 93000 ELECTROCARDIOGRAM COMPLETE: CPT | Performed by: INTERNAL MEDICINE

## 2022-08-18 RX ORDER — ROSUVASTATIN CALCIUM 40 MG/1
40 TABLET, COATED ORAL DAILY
Qty: 90 TABLET | Refills: 3 | Status: SHIPPED | OUTPATIENT
Start: 2022-08-18 | End: 2023-03-23

## 2022-08-18 NOTE — PROGRESS NOTES
[facility]OLOG[facility]  [address] [place] [address]  Dept: [phone]  Dept Fax: [phone]    Pilar Colon  1945    New Patient Office Note    History of Present Illness:  Pilar Colon is a 76 y.o. female who presents to the clinic to establish care for CAD- She is smoker has COPD and has CAD with stent in 2001 to the RCA, and has had also another cath in 2009 with 30% LAD and 20% Cx plus 20% RCA,  She denies any chest pain,  Has some SOB on exertion, no edema, , we were able to look for records at the [facility] and she has been admitted in 2019 with COPD pneumonia and developed atrial fibrillation went back on Amiodarone,, and recently in 3/2022 for SOB and echo was done with normal Ef, she is not aware about having atrial fibrillation, she is not taking any blood thinners, I have reviewed EKG from 2019 clearly has Atrial fibrillation, denies any palpitations, her EKG today  Sinus rhythm with HR 71, her cardiac exam seems normal, , will get a stress lexiscan nuclear, will give her a Holter monitor, will start anticoagulation, her GARFIELD score is 4,,    The following portions of the patient's history were reviewed and updated as appropriate: allergies, current medications, past family history, past social history, past surgical history and problem list.    Medications:  Aspirin capsule  budesonide  busPIRone  dilTIAZem  furosemide  gabapentin  guaiFENesin  HYDROcodone-acetaminophen  ipratropium-albuterol  losartan  meclizine  omeprazole  PARoxetine  rosuvastatin    Subjective  Allergies   Allergen Reactions   • Tramadol Swelling        Past Medical History:   Diagnosis Date   • Anemia    • Anxiety    • Arthritis    • Asthma    • Bronchitis    • Chronic bronchitis with pulmonary emphysema (HCC) 01/23/2015   • COPD mixed type (HCC)    • Coronary arteriosclerosis in native artery 01/20/2010   • Depression    • Diverticulitis    • Dupuytren contracture     • Emphysema (subcutaneous) (surgical) resulting from a procedure    • High risk medication use    • History of degenerative disc disease     spine   • History of left heart catheterization     STENT PLACED   • Hx of adenomatous colonic polyps    • Hyperlipidemia    • Hypertension 2014   • Low back pain    • Mixed hyperlipidemia 2014   • Osteoporosis    • Paroxysmal A-fib (HCC)    • Peripheral polyneuropathy    • Personal history of nicotine dependence    • Pneumonia    • Recurrent major depressive disorder in partial remission (HCC)    • Stress incontinence    • Vertigo    • Vitamin D deficiency        Past Surgical History:   Procedure Laterality Date   • ARTERY SURGERY      STENT LAD   • BREAST BIOPSY Right     [facility], Dr. Severo Kay   • BREAST LUMPECTOMY     • BREAST LUMPECTOMY Left    • HAND SURGERY Left     Arthritis surgery, [place], [place]   • SHOULDER SURGERY Bilateral     [facility]. Dr. Dixon   • SHOULDER SURGERY Bilateral    • TUBAL ABDOMINAL LIGATION         Family History   Problem Relation Age of Onset   • Liver cancer Father    • Diabetes Sister 53   • Heart disease Sister 59        Undefined        Social History     Socioeconomic History   • Marital status: Single   Tobacco Use   • Smoking status: Current Some Day Smoker     Packs/day: 0.25     Years: 20.00     Pack years: 5.00     Types: Cigarettes     Start date:      Last attempt to quit:      Years since quittin.6   • Smokeless tobacco: Never Used   Vaping Use   • Vaping Use: Never used   Substance and Sexual Activity   • Alcohol use: Yes     Comment: 1 Pint per week, SATURDAY NIGHT LAST DRINK    • Drug use: Never   • Sexual activity: Defer       Review of Systems   Constitutional: Negative.    HENT: Negative.    Respiratory: Positive for shortness of breath.    Cardiovascular: Negative.    Endocrine: Negative.    Genitourinary: Negative.    Musculoskeletal: Negative.    Skin: Negative.   "  Allergic/Immunologic: Negative.    Neurological: Negative.    Hematological: Negative.    Psychiatric/Behavioral: Negative.        Cardiovascular Procedures    ECHO/MUGA:   STRESS TESTS:   CARDIAC CATH:   DEVICES:   HOLTER:   CT/MRI:   VASCULAR:   CARDIOTHORACIC:     Objective  Vitals:    08/18/22 1113   BP: 114/66   BP Location: Left arm   Patient Position: Lying   Cuff Size: Adult   Pulse: 77   Resp: 16   Temp: 98 °F (36.7 °C)   TempSrc: Infrared   SpO2: 99%   Weight: 56.2 kg (124 lb)   Height: 152.4 cm (60\")   PainSc: 0-No pain       Physical Exam  Vitals reviewed.   Constitutional:       Appearance: Healthy appearance. Not in distress.   Neck:      Vascular: No JVR. JVD normal.   Pulmonary:      Effort: Pulmonary effort is normal.      Breath sounds: Normal breath sounds. No wheezing. No rhonchi. No rales.   Chest:      Chest wall: Not tender to palpatation.   Cardiovascular:      PMI at left midclavicular line. Normal rate. Regular rhythm. Normal S1. Normal S2.      Murmurs: There is no murmur.      No gallop. No click. No rub.   Pulses:     Intact distal pulses.   Abdominal:      General: Bowel sounds are normal.      Palpations: Abdomen is soft.      Tenderness: There is no abdominal tenderness.   Musculoskeletal: Normal range of motion.         General: No tenderness. Skin:     General: Skin is warm and dry.   Neurological:      General: No focal deficit present.      Mental Status: Alert and oriented to person, place and time.          Diagnostic Data    ECG 12 Lead    Date/Time: 8/18/2022 7:49 PM  Performed by: Rubin Leija MD  Authorized by: Rubin Leija MD   Comparison: compared with previous ECG from 10/4/2021  Similar to previous ECG  Rhythm: sinus rhythm  Rate: normal  BPM: 71  QRS axis: normal    Clinical impression: normal ECG            Assessment and Plan  Diagnoses and all orders for this visit:    Coronary artery disease of native artery of native heart with stable angina " pectoris (HCC)- Stent to RCA in 2001, cath in 2009 mild disease, still smoking, no chest pain, just SOB, will see a stress nuclear, recent echo normal EF.  -     Stress Test With Myocardial Perfusion One Day; Future    Pure hypercholesterolemia- On Pravastatin 80 mg LDL is high over 120, will use Crestor 40 mg, likely will need Zetia after    Hypertension, essential- The BP is 110.60 she is on Cardizem 240 mg, Losartan 50 mg and Lasix 20 mg    Pulmonary emphysema, unspecified emphysema type (HCC)- Per PCP    Paroxysmal atrial fibrillation (HCC)- After hours we were able to get records from 2019 from the Hospital with  Evidence of AF, we have given her a Holter, will call her to start Eliquis 5 mg bid  -     Holter Monitor - 48 Hour; Future    Other orders  -     rosuvastatin (CRESTOR) 40 MG tablet; Take 1 tablet by mouth Daily.        Return in about 2 weeks (around 9/1/2022) for Recheck.    Rubin Leija MD  08/18/2022

## 2022-08-23 ENCOUNTER — TELEPHONE (OUTPATIENT)
Dept: CARDIOLOGY | Facility: CLINIC | Age: 77
End: 2022-08-23

## 2022-08-23 NOTE — TELEPHONE ENCOUNTER
Left a message for Dorothy, contact for Ms. Colon, that the holter monitor results for the 48 hours she wore it were normal and nothing founded.  We did get the records of past EKG that did show afib and Dr. Leija called in a prescription for Eliquis 5mg bid to cover her from blood clots and we wanted to make sure she has started this medicine.  Please call back and confirm.  Thank you.

## 2022-08-23 NOTE — TELEPHONE ENCOUNTER
----- Message from Rubin Leija MD sent at 8/22/2022 10:46 PM EDT -----  I think we called her last week to start taking blood thinner, ,just to be sure

## 2022-08-23 NOTE — TELEPHONE ENCOUNTER
Patient returned your call and has never heard anything about the blood thinner, and she said there's been nothing sent into the pharmacy. Make [facility] to [facility]

## 2022-08-26 ENCOUNTER — PATIENT ROUNDING (BHMG ONLY) (OUTPATIENT)
Dept: CARDIOLOGY | Facility: CLINIC | Age: 77
End: 2022-08-26

## 2022-08-26 NOTE — PROGRESS NOTES
August 26, 2022    Hello, may I speak with Pilar Colon?    My name is kiko      I am  with [facility] [place]  [facility] CARDIOLOGY  [address] [place] [address].    Before we get started may I verify your date of birth? 1945    Sick will call us back   
1-2 cups/cans per day

## 2022-09-06 ENCOUNTER — TELEPHONE (OUTPATIENT)
Dept: FAMILY MEDICINE CLINIC | Facility: CLINIC | Age: 77
End: 2022-09-06

## 2022-09-06 NOTE — TELEPHONE ENCOUNTER
Caller: Pilar Colon    Relationship: Self    Best call back number: [phone]    Caller requesting test results: SELF    What test was performed:  LABS AND HIP SCANS    When was the test performed: 08/22    Where was the test performed: BLOODWORK IN OFFICE AND HIP SCAN ON WEST SIDE    Additional notes: PLEASE CALL TO DISCUSS RESULTS

## 2022-09-15 ENCOUNTER — TELEPHONE (OUTPATIENT)
Dept: FAMILY MEDICINE CLINIC | Facility: CLINIC | Age: 77
End: 2022-09-15

## 2022-09-15 RX ORDER — MECLIZINE HYDROCHLORIDE 25 MG/1
25 TABLET ORAL 3 TIMES DAILY PRN
Qty: 90 TABLET | Refills: 0 | Status: SHIPPED | OUTPATIENT
Start: 2022-09-15 | End: 2022-11-30 | Stop reason: SDUPTHER

## 2022-09-15 NOTE — TELEPHONE ENCOUNTER
Caller: Pilar Colon    Relationship: Self    Best call back number: [phone]    Requested Prescriptions:   Requested Prescriptions     Pending Prescriptions Disp Refills   • meclizine (ANTIVERT) 25 MG tablet 90 tablet 0     Sig: Take 1 tablet by mouth 3 (Three) Times a Day As Needed for Dizziness or Nausea.        Pharmacy where request should be sent: [facility] ([facility]) - [address] - [phone]  - [phone] FX     Additional details provided by patient: HAS 3 DAYS LEFT, WOULD LIKE IT FILLED ASAP BEFORE THE WEEKEND, HAS AN APPOINTMENT ON THE 20TH    Does the patient have less than a 3 day supply:  [x] Yes  [] No    Isaiah SOUZA Rep   09/15/22 12:02 EDT

## 2022-09-15 NOTE — TELEPHONE ENCOUNTER
Rx Refill Note    Requested Prescriptions     Pending Prescriptions Disp Refills   • meclizine (ANTIVERT) 25 MG tablet 90 tablet 0     Sig: Take 1 tablet by mouth 3 (Three) Times a Day As Needed for Dizziness or Nausea.        Last office visit with prescribing clinician: 8/16/2022      Next office visit with prescribing clinician: 9/20/2022   Last labs: 08/16/2022  Last refill:  unknown  Ph[facility]

## 2022-09-20 ENCOUNTER — OFFICE VISIT (OUTPATIENT)
Dept: FAMILY MEDICINE CLINIC | Facility: CLINIC | Age: 77
End: 2022-09-20

## 2022-09-20 VITALS
RESPIRATION RATE: 14 BRPM | WEIGHT: 121.3 LBS | BODY MASS INDEX: 23.81 KG/M2 | DIASTOLIC BLOOD PRESSURE: 80 MMHG | HEART RATE: 83 BPM | OXYGEN SATURATION: 95 % | TEMPERATURE: 97.4 F | SYSTOLIC BLOOD PRESSURE: 120 MMHG | HEIGHT: 60 IN

## 2022-09-20 DIAGNOSIS — I25.118 CORONARY ARTERY DISEASE OF NATIVE ARTERY OF NATIVE HEART WITH STABLE ANGINA PECTORIS: ICD-10-CM

## 2022-09-20 DIAGNOSIS — J43.9 PULMONARY EMPHYSEMA, UNSPECIFIED EMPHYSEMA TYPE: ICD-10-CM

## 2022-09-20 DIAGNOSIS — I10 HYPERTENSION, ESSENTIAL: Primary | ICD-10-CM

## 2022-09-20 DIAGNOSIS — F43.23 ADJUSTMENT DISORDER WITH MIXED ANXIETY AND DEPRESSED MOOD: ICD-10-CM

## 2022-09-20 DIAGNOSIS — Z13.9 SCREENING DUE: ICD-10-CM

## 2022-09-20 DIAGNOSIS — F32.A DEPRESSION, UNSPECIFIED DEPRESSION TYPE: ICD-10-CM

## 2022-09-20 DIAGNOSIS — N18.31 CHRONIC KIDNEY DISEASE, STAGE 3A: ICD-10-CM

## 2022-09-20 PROBLEM — R73.9 HYPERGLYCEMIA: Status: ACTIVE | Noted: 2022-09-20

## 2022-09-20 PROBLEM — R42 VERTIGO: Status: ACTIVE | Noted: 2022-09-20

## 2022-09-20 PROBLEM — E55.9 VITAMIN D DEFICIENCY: Status: ACTIVE | Noted: 2022-09-20

## 2022-09-20 PROBLEM — Z79.899 HIGH RISK MEDICATION USE: Status: ACTIVE | Noted: 2022-09-20

## 2022-09-20 PROBLEM — F33.41 RECURRENT MAJOR DEPRESSION IN PARTIAL REMISSION: Status: ACTIVE | Noted: 2022-09-20

## 2022-09-20 PROBLEM — M72.0 DUPUYTREN'S CONTRACTURE: Status: ACTIVE | Noted: 2022-09-20

## 2022-09-20 PROBLEM — F19.21 H/O: DRUG DEPENDENCY: Status: ACTIVE | Noted: 2022-09-20

## 2022-09-20 PROBLEM — E78.2 HYPERLIPIDEMIA, MIXED: Status: ACTIVE | Noted: 2021-10-07

## 2022-09-20 PROBLEM — G62.9 POLYNEUROPATHY: Status: ACTIVE | Noted: 2022-09-20

## 2022-09-20 PROCEDURE — 99214 OFFICE O/P EST MOD 30 MIN: CPT | Performed by: FAMILY MEDICINE

## 2022-09-20 NOTE — PROGRESS NOTES
Patient Name: Pilar Colon  : 1945   MRN: [id number]     Chief Complaint:    Chief Complaint   Patient presents with   • lab results     Pt here for lab results   • COPD   • Hyperlipidemia   • Hypertension       History of Present Illness: Pilar Colon is a 76 y.o. female who is here today for follow up on kidney function and depression  HPI        Review of Systems:   Review of Systems   Constitutional: Negative.    HENT: Negative.    Eyes: Negative.    Respiratory: Negative.    Cardiovascular: Negative.    Gastrointestinal: Negative.    Neurological: Negative.         Past Medical History:   Past Medical History:   Diagnosis Date   • Anemia    • Anxiety    • Arthritis    • Asthma    • Bronchitis    • Chronic bronchitis with pulmonary emphysema (HCC) 2015   • COPD mixed type ([facility])    • Coronary arteriosclerosis in native artery 2010   • Depression    • Diverticulitis    • Dupuytren contracture    • Emphysema (subcutaneous) (surgical) resulting from a procedure    • High risk medication use    • History of degenerative disc disease     spine   • History of left heart catheterization     STENT PLACED   • Hx of adenomatous colonic polyps    • Hyperlipidemia    • Hypertension 2014   • Low back pain    • Mixed hyperlipidemia 2014   • Osteoporosis    • Paroxysmal A-fib ([facility])    • Peripheral polyneuropathy    • Personal history of nicotine dependence    • Pneumonia    • Recurrent major depressive disorder in partial remission ([facility])    • Stress incontinence    • Vertigo    • Vitamin D deficiency        Past Surgical History:   Past Surgical History:   Procedure Laterality Date   • ARTERY SURGERY      STENT LAD   • BREAST BIOPSY Right     [place], Dr. Severo Kay   • BREAST LUMPECTOMY     • BREAST LUMPECTOMY Left    • HAND SURGERY Left     Arthritis surgery, [place], [place]   • SHOULDER SURGERY Bilateral     [facility]. Dr. Dixon   • SHOULDER SURGERY Bilateral    • TUBAL  ABDOMINAL LIGATION         Family History:   Family History   Problem Relation Age of Onset   • Liver cancer Father    • Diabetes Sister 53   • Heart disease Sister 59        Undefined       Social History:   Social History     Socioeconomic History   • Marital status: Single   Tobacco Use   • Smoking status: Current Some Day Smoker     Packs/day: 0.25     Years: 20.00     Pack years: 5.00     Types: Cigarettes     Start date:      Last attempt to quit:      Years since quittin.7   • Smokeless tobacco: Never Used   Vaping Use   • Vaping Use: Never used   Substance and Sexual Activity   • Alcohol use: Yes     Comment: 1 Pint per week, SATURDAY NIGHT LAST DRINK    • Drug use: Never   • Sexual activity: Defer       Medications:     Current Outpatient Medications:   •  apixaban (ELIQUIS) 5 MG tablet tablet, Take 1 tablet by mouth 2 (Two) Times a Day., Disp: 180 tablet, Rfl: 0  •  Aspirin 81 MG capsule, Take 81 mg by mouth Daily. OTC, Disp: , Rfl:   •  budesonide (PULMICORT) 0.5 MG/2ML nebulizer solution, Use 1 nebule (2mL) by nebulization Daily., Disp: 60 mL, Rfl: 1  •  busPIRone (BUSPAR) 10 MG tablet, Take 1 tablet by mouth 2 (Two) Times a Day., Disp: 180 tablet, Rfl: 0  •  dilTIAZem (TIAZAC) 240 MG 24 hr capsule, TAKE 1 CAPSULE BY MOUTH DAILY, Disp: 90 capsule, Rfl: 0  •  furosemide (LASIX) 20 MG tablet, Take 1 tablet by mouth Daily As Needed., Disp: , Rfl:   •  gabapentin (NEURONTIN) 400 MG capsule, Take 400 mg by mouth 3 (Three) Times a Day., Disp: , Rfl:   •  guaiFENesin (MUCINEX) 600 MG 12 hr tablet, Take 2 tablets by mouth Every 12 (Twelve) Hours., Disp: 120 tablet, Rfl: 0  •  HYDROcodone-acetaminophen (NORCO)  MG per tablet, TAKE 1 TABLET BY MOUTH EVERY 6 HOURS. DO NOT FILL ANY EARLIER THAN 30 DAYS, Disp: , Rfl:   •  ipratropium-albuterol (DUO-NEB) 0.5-2.5 mg/3 ml nebulizer, Take 3 mL by nebulization Every 4 (Four) Hours As Needed for Wheezing or Shortness of Air., Disp: 360 mL, Rfl: 0  •   "losartan (COZAAR) 50 MG tablet, Take 1 tablet by mouth Daily., Disp: 90 tablet, Rfl: 0  •  meclizine (ANTIVERT) 25 MG tablet, Take 1 tablet by mouth 3 (Three) Times a Day As Needed for Dizziness or Nausea., Disp: 90 tablet, Rfl: 0  •  omeprazole (priLOSEC) 40 MG capsule, Take 1 capsule by mouth Daily., Disp: 90 capsule, Rfl: 1  •  PARoxetine (PAXIL) 40 MG tablet, Take 1 tablet by mouth Every Morning., Disp: 90 tablet, Rfl: 0  •  rosuvastatin (CRESTOR) 40 MG tablet, Take 1 tablet by mouth Daily., Disp: 90 tablet, Rfl: 3    Allergies:   Allergies   Allergen Reactions   • Tramadol Swelling         Physical Exam:  Vital Signs:   Vitals:    09/20/22 1117   BP: 120/80   BP Location: Left arm   Patient Position: Sitting   Cuff Size: Adult   Pulse: 83   Resp: 14   Temp: 97.4 °F (36.3 °C)   TempSrc: Temporal   SpO2: 95%   Weight: 55 kg (121 lb 4.8 oz)   Height: 152.4 cm (60\")   PainSc: 0-No pain     Body mass index is 23.69 kg/m².     Physical Exam  Vitals and nursing note reviewed.   Constitutional:       Appearance: Normal appearance. She is normal weight.   HENT:      Head: Normocephalic and atraumatic.      Right Ear: Tympanic membrane, ear canal and external ear normal.      Left Ear: Tympanic membrane, ear canal and external ear normal.      Nose: Nose normal.      Mouth/Throat:      Mouth: Mucous membranes are dry.      Pharynx: Oropharynx is clear.   Eyes:      Extraocular Movements: Extraocular movements intact.      Conjunctiva/sclera: Conjunctivae normal.      Pupils: Pupils are equal, round, and reactive to light.   Cardiovascular:      Rate and Rhythm: Normal rate and regular rhythm.      Pulses: Normal pulses.      Heart sounds: Normal heart sounds.   Pulmonary:      Effort: Pulmonary effort is normal.      Breath sounds: Normal breath sounds.   Musculoskeletal:      Cervical back: Normal range of motion and neck supple.   Feet:      Comments:      Neurological:      Mental Status: She is alert. "         Procedures      Assessment/Plan:   Diagnoses and all orders for this visit:    1. Hypertension, essential (Primary)  Assessment & Plan:  Patient is instructed to not take any NSAIDs.  Medicines as directed.  Stay well-hydrated.        2. Screening due  -     Hepatitis C Antibody; Future    3. Coronary artery disease of native artery of native heart with stable angina pectoris (HCC)  Assessment & Plan:  Risk factor modification.      4. Pulmonary emphysema, unspecified emphysema type (HCC)  Assessment & Plan:  Patient is doing well.  We will get her immunizations up-to-date.        5. Depression, unspecified depression type    6. Adjustment disorder with mixed anxiety and depressed mood  Assessment & Plan:  Patient's son is on opiates.  Her son's girlfriend just over deed.  This is caused her a lot of stress because she is very worried over her son.  Recheck in 3 months.      7. Chronic kidney disease, stage 3a (HCC)  Assessment & Plan:  GFR 47.Patient is instructed to not take any NSAIDs.  Medicines as directed.  Stay well-hydrated.               Follow Up:   Return in about 3 months (around 12/20/2022).    Omer Diaz MD  [facility]

## 2022-09-20 NOTE — ASSESSMENT & PLAN NOTE
GFR 47.Patient is instructed to not take any NSAIDs.  Medicines as directed.  Stay well-hydrated.

## 2022-09-20 NOTE — ASSESSMENT & PLAN NOTE
Patient's son is on opiates.  Her son's girlfriend just over deed.  This is caused her a lot of stress because she is very worried over her son.  Recheck in 3 months.

## 2022-09-27 DIAGNOSIS — I10 ESSENTIAL HYPERTENSION, BENIGN: ICD-10-CM

## 2022-09-27 RX ORDER — DILTIAZEM HYDROCHLORIDE 240 MG/1
240 CAPSULE, EXTENDED RELEASE ORAL DAILY
Qty: 90 CAPSULE | Refills: 0 | Status: SHIPPED | OUTPATIENT
Start: 2022-09-27 | End: 2023-01-31 | Stop reason: SDUPTHER

## 2022-09-27 NOTE — TELEPHONE ENCOUNTER
Caller: Miquel Colonma FRANKO    Relationship: Self    Best call back number: [phone]    Requested Prescriptions:   Requested Prescriptions     Pending Prescriptions Disp Refills   • dilTIAZem (TIAZAC) 240 MG 24 hr capsule 90 capsule 0     Sig: Take 1 capsule by mouth Daily.        Pharmacy where request should be sent: [facility] - [facility] [address] - [phone]  - [phone] FX     Additional details provided by patient: 5 DAYS LEFT     Does the patient have less than a 3 day supply:  [] Yes  [x] No    Isaiah Crow Rep   09/27/22 11:37 EDT

## 2022-09-27 NOTE — TELEPHONE ENCOUNTER
Rx Refill Note    Requested Prescriptions     Pending Prescriptions Disp Refills   • dilTIAZem (TIAZAC) 240 MG 24 hr capsule 90 capsule 0     Sig: Take 1 capsule by mouth Daily.        Last office visit with prescribing clinician: 9/20/2022      Next office visit with prescribing clinician: 12/20/2022   Last labs:   Last refill: 07/05/2022   Pharmacy (be sure to add in Epic). correct

## 2022-11-04 ENCOUNTER — TELEPHONE (OUTPATIENT)
Dept: FAMILY MEDICINE CLINIC | Facility: CLINIC | Age: 77
End: 2022-11-04

## 2022-11-04 NOTE — TELEPHONE ENCOUNTER
CALLED PT AND SHE IS WHEEZING REALLY BAD AND HAS COPD PT IS SICK AND REFUSES TO GO TO ER RIGHT NOW, IM SENDING THIS TO DR. DAI BUT PT KNOW'S HE WILL NOT BE BACK TIL Monday IF SHE GETS WORSE SHE WILL GO TO ER

## 2022-11-04 NOTE — TELEPHONE ENCOUNTER
Caller: Pilar Colon    Relationship: Self    Best call back number: [phone]    Requested Prescriptions:   COUGH SUPRESSANT    Pharmacy where request should be sent: [facility] #[id number] [place], [place] - [facility] [address]  AT [facility] [address] - [phone]  - [phone] FX     Additional details provided by patient: PATIENT LOOKING FOR A GOOD COUGH SUPRESSANT    Does the patient have less than a 3 day supply:  [] Yes  [x] No    Isaiah Panda Rep   11/04/22 09:51 EDT

## 2022-11-07 RX ORDER — DOXYCYCLINE 100 MG/1
100 TABLET ORAL 2 TIMES DAILY
Qty: 20 TABLET | Refills: 0 | Status: SHIPPED | OUTPATIENT
Start: 2022-11-07 | End: 2022-12-20

## 2022-11-07 RX ORDER — PREDNISONE 10 MG/1
10 TABLET ORAL DAILY
Qty: 30 TABLET | Refills: 0 | Status: SHIPPED | OUTPATIENT
Start: 2022-11-07 | End: 2022-12-20 | Stop reason: SDUPTHER

## 2022-11-07 NOTE — TELEPHONE ENCOUNTER
Called patient.  She is feeling better.  She is continuing to cough.  She has not short of breath too much.  I am going to call her with doxycycline and a prednisone taper.  Reinforced that if she gets worse she is emergency room.

## 2022-11-30 DIAGNOSIS — K21.9 GASTROESOPHAGEAL REFLUX DISEASE WITHOUT ESOPHAGITIS: ICD-10-CM

## 2022-11-30 RX ORDER — BUSPIRONE HYDROCHLORIDE 10 MG/1
10 TABLET ORAL 2 TIMES DAILY
Qty: 180 TABLET | Refills: 0 | Status: SHIPPED | OUTPATIENT
Start: 2022-11-30 | End: 2023-03-23 | Stop reason: SDUPTHER

## 2022-11-30 RX ORDER — LOSARTAN POTASSIUM 50 MG/1
50 TABLET ORAL DAILY
Qty: 90 TABLET | Refills: 0 | Status: SHIPPED | OUTPATIENT
Start: 2022-11-30 | End: 2023-03-23 | Stop reason: SDUPTHER

## 2022-11-30 RX ORDER — OMEPRAZOLE 40 MG/1
40 CAPSULE, DELAYED RELEASE ORAL DAILY
Qty: 90 CAPSULE | Refills: 0 | Status: SHIPPED | OUTPATIENT
Start: 2022-11-30 | End: 2023-03-23 | Stop reason: SDUPTHER

## 2022-11-30 RX ORDER — MECLIZINE HYDROCHLORIDE 25 MG/1
25 TABLET ORAL 3 TIMES DAILY PRN
Qty: 90 TABLET | Refills: 0 | Status: SHIPPED | OUTPATIENT
Start: 2022-11-30 | End: 2023-01-31 | Stop reason: SDUPTHER

## 2022-11-30 NOTE — TELEPHONE ENCOUNTER
Caller: Pilar Colon    Relationship: Self    Best call back number: [phone]    Requested Prescriptions:   Requested Prescriptions     Pending Prescriptions Disp Refills   • losartan (COZAAR) 50 MG tablet 90 tablet 0     Sig: Take 1 tablet by mouth Daily.   • omeprazole (priLOSEC) 40 MG capsule 90 capsule 1     Sig: Take 1 capsule by mouth Daily.   • busPIRone (BUSPAR) 10 MG tablet 180 tablet 0     Sig: Take 1 tablet by mouth 2 (Two) Times a Day.   • meclizine (ANTIVERT) 25 MG tablet 90 tablet 0     Sig: Take 1 tablet by mouth 3 (Three) Times a Day As Needed for Dizziness or Nausea.        Pharmacy where request should be sent: [facility] - [facility] [address] - [phone] [facility] [phone] FX     Additional details provided by patient: 4-5 DAYS LEFT     Does the patient have less than a 3 day supply:  [] Yes  [x] No    Would you like a call back once the refill request has been completed: [] Yes [x] No    If the office needs to give you a call back, can they leave a voicemail: [] Yes [x] No    Isaiah Crow Rep   11/30/22 10:13 EST

## 2022-11-30 NOTE — TELEPHONE ENCOUNTER
Rx Refill Note    Requested Prescriptions     Pending Prescriptions Disp Refills   • losartan (COZAAR) 50 MG tablet 90 tablet 0     Sig: Take 1 tablet by mouth Daily.   • omeprazole (priLOSEC) 40 MG capsule 90 capsule 0     Sig: Take 1 capsule by mouth Daily.   • busPIRone (BUSPAR) 10 MG tablet 180 tablet 0     Sig: Take 1 tablet by mouth 2 (Two) Times a Day.   • meclizine (ANTIVERT) 25 MG tablet 90 tablet 0     Sig: Take 1 tablet by mouth 3 (Three) Times a Day As Needed for Dizziness or Nausea.        Last office visit with prescribing clinician: 9/20/2022      Next office visit with prescribing clinician: 12/20/2022   Last labs:  Last refill: needs   Pharmacy (be sure to add in Epic). correct

## 2022-12-20 ENCOUNTER — OFFICE VISIT (OUTPATIENT)
Dept: FAMILY MEDICINE CLINIC | Facility: CLINIC | Age: 77
End: 2022-12-20

## 2022-12-20 VITALS
OXYGEN SATURATION: 97 % | DIASTOLIC BLOOD PRESSURE: 80 MMHG | WEIGHT: 121 LBS | TEMPERATURE: 98.6 F | SYSTOLIC BLOOD PRESSURE: 138 MMHG | RESPIRATION RATE: 16 BRPM | BODY MASS INDEX: 23.75 KG/M2 | HEART RATE: 80 BPM | HEIGHT: 60 IN

## 2022-12-20 DIAGNOSIS — I10 HYPERTENSION, ESSENTIAL: Primary | ICD-10-CM

## 2022-12-20 DIAGNOSIS — Z13.9 SCREENING DUE: ICD-10-CM

## 2022-12-20 DIAGNOSIS — E55.9 VITAMIN D DEFICIENCY: ICD-10-CM

## 2022-12-20 DIAGNOSIS — J44.9 CHRONIC BRONCHITIS WITH PULMONARY EMPHYSEMA: ICD-10-CM

## 2022-12-20 DIAGNOSIS — R73.9 HYPERGLYCEMIA: ICD-10-CM

## 2022-12-20 DIAGNOSIS — E78.2 HYPERLIPIDEMIA, MIXED: ICD-10-CM

## 2022-12-20 PROCEDURE — 99214 OFFICE O/P EST MOD 30 MIN: CPT | Performed by: FAMILY MEDICINE

## 2022-12-20 PROCEDURE — 36415 COLL VENOUS BLD VENIPUNCTURE: CPT | Performed by: FAMILY MEDICINE

## 2022-12-20 RX ORDER — BACLOFEN 10 MG/1
10 TABLET ORAL 2 TIMES DAILY
COMMUNITY
Start: 2022-12-01

## 2022-12-20 RX ORDER — PREDNISONE 10 MG/1
10 TABLET ORAL DAILY
Qty: 30 TABLET | Refills: 0 | Status: SHIPPED | OUTPATIENT
Start: 2022-12-20

## 2022-12-20 RX ORDER — DOXYCYCLINE 100 MG/1
100 TABLET ORAL 2 TIMES DAILY
Qty: 20 TABLET | Refills: 0 | Status: SHIPPED | OUTPATIENT
Start: 2022-12-20 | End: 2023-01-31

## 2022-12-20 NOTE — PROGRESS NOTES
Patient Name: Pilar Colon  : 1945   MRN: [id number]     Chief Complaint:    Chief Complaint   Patient presents with   • Results     Pt here for CT results       History of Present Illness: Pilar Colon is a 77 y.o. female who is here today for follow up.  HPI        Review of Systems:   Review of Systems   Constitutional: Negative.    HENT: Negative.    Eyes: Negative.    Respiratory: Negative.    Cardiovascular: Negative.    Gastrointestinal: Negative.    Neurological: Negative.         Past Medical History:   Past Medical History:   Diagnosis Date   • Anemia    • Anxiety    • Arthritis    • Asthma    • Bronchitis    • Chronic bronchitis with pulmonary emphysema (HCC) 2015   • COPD mixed type ([facility])    • Coronary arteriosclerosis in native artery 2010   • Depression    • Diverticulitis    • Dupuytren contracture    • Emphysema (subcutaneous) (surgical) resulting from a procedure    • High risk medication use    • History of degenerative disc disease     spine   • History of left heart catheterization     STENT PLACED   • Hx of adenomatous colonic polyps    • Hyperlipidemia    • Hypertension 2014   • Low back pain    • Mixed hyperlipidemia 2014   • Osteoporosis    • Paroxysmal A-fib ([facility])    • Peripheral polyneuropathy    • Personal history of nicotine dependence    • Pneumonia    • Recurrent major depressive disorder in partial remission ([facility])    • Stress incontinence    • Vertigo    • Vitamin D deficiency        Past Surgical History:   Past Surgical History:   Procedure Laterality Date   • ARTERY SURGERY      STENT LAD   • BREAST BIOPSY Right     [facility], Dr. Severo Kay   • BREAST LUMPECTOMY     • BREAST LUMPECTOMY Left    • HAND SURGERY Left     Arthritis surgery, [place], [place]   • SHOULDER SURGERY Bilateral     [facility]. Dr. Dixon   • SHOULDER SURGERY Bilateral    • TUBAL ABDOMINAL LIGATION         Family History:   Family History   Problem Relation Age of  Onset   • Liver cancer Father    • Diabetes Sister 53   • Heart disease Sister 59        Undefined       Social History:   Social History     Socioeconomic History   • Marital status: Single   Tobacco Use   • Smoking status: Some Days     Packs/day: 0.25     Years: 20.00     Pack years: 5.00     Types: Cigarettes     Start date:      Last attempt to quit:      Years since quittin.9   • Smokeless tobacco: Never   Vaping Use   • Vaping Use: Never used   Substance and Sexual Activity   • Alcohol use: Yes     Comment: 1 Pint per week, SATURDAY NIGHT LAST DRINK    • Drug use: Never   • Sexual activity: Defer       Medications:     Current Outpatient Medications:   •  apixaban (ELIQUIS) 5 MG tablet tablet, Take 1 tablet by mouth 2 (Two) Times a Day., Disp: 180 tablet, Rfl: 0  •  Aspirin 81 MG capsule, Take 81 mg by mouth Daily. OTC, Disp: , Rfl:   •  baclofen (LIORESAL) 10 MG tablet, Take 10 mg by mouth 2 (Two) Times a Day., Disp: , Rfl:   •  budesonide (PULMICORT) 0.5 MG/2ML nebulizer solution, Use 1 nebule (2mL) by nebulization Daily., Disp: 60 mL, Rfl: 1  •  busPIRone (BUSPAR) 10 MG tablet, Take 1 tablet by mouth 2 (Two) Times a Day., Disp: 180 tablet, Rfl: 0  •  dilTIAZem (TIAZAC) 240 MG 24 hr capsule, Take 1 capsule by mouth Daily., Disp: 90 capsule, Rfl: 0  •  furosemide (LASIX) 20 MG tablet, Take 1 tablet by mouth Daily As Needed., Disp: , Rfl:   •  gabapentin (NEURONTIN) 400 MG capsule, Take 400 mg by mouth 3 (Three) Times a Day., Disp: , Rfl:   •  guaiFENesin (MUCINEX) 600 MG 12 hr tablet, Take 2 tablets by mouth Every 12 (Twelve) Hours., Disp: 120 tablet, Rfl: 0  •  HYDROcodone-acetaminophen (NORCO)  MG per tablet, TAKE 1 TABLET BY MOUTH EVERY 6 HOURS. DO NOT FILL ANY EARLIER THAN 30 DAYS, Disp: , Rfl:   •  ipratropium-albuterol (DUO-NEB) 0.5-2.5 mg/3 ml nebulizer, Take 3 mL by nebulization Every 4 (Four) Hours As Needed for Wheezing or Shortness of Air., Disp: 360 mL, Rfl: 0  •  losartan  "(COZAAR) 50 MG tablet, Take 1 tablet by mouth Daily., Disp: 90 tablet, Rfl: 0  •  meclizine (ANTIVERT) 25 MG tablet, Take 1 tablet by mouth 3 (Three) Times a Day As Needed for Dizziness or Nausea., Disp: 90 tablet, Rfl: 0  •  omeprazole (priLOSEC) 40 MG capsule, Take 1 capsule by mouth Daily., Disp: 90 capsule, Rfl: 0  •  PARoxetine (PAXIL) 40 MG tablet, Take 1 tablet by mouth Every Morning., Disp: 90 tablet, Rfl: 0  •  predniSONE (DELTASONE) 10 MG tablet, Take 1 tablet by mouth Daily. 5 po once a day for 2 days, 4 po once a day for 2 days, 3 po once a day for 2 days, 2 po once a day for 2 days, 1 po once a day for 2 days, Disp: 30 tablet, Rfl: 0  •  rosuvastatin (CRESTOR) 40 MG tablet, Take 1 tablet by mouth Daily., Disp: 90 tablet, Rfl: 3  •  doxycycline (ADOXA) 100 MG tablet, Take 1 tablet by mouth 2 (Two) Times a Day., Disp: 20 tablet, Rfl: 0    Allergies:   Allergies   Allergen Reactions   • Tramadol Swelling         Physical Exam:  Vital Signs:   Vitals:    12/20/22 1112   BP: 138/80   BP Location: Left arm   Patient Position: Sitting   Cuff Size: Adult   Pulse: 80   Resp: 16   Temp: 98.6 °F (37 °C)   TempSrc: Temporal   SpO2: 97%   Weight: 54.9 kg (121 lb)   Height: 152.4 cm (60\")   PainSc: 0-No pain     Body mass index is 23.63 kg/m².     Physical Exam  Vitals and nursing note reviewed.   Constitutional:       Appearance: Normal appearance. She is normal weight.   HENT:      Head: Normocephalic and atraumatic.      Right Ear: Tympanic membrane, ear canal and external ear normal.      Left Ear: Tympanic membrane, ear canal and external ear normal.      Nose: Nose normal.      Mouth/Throat:      Mouth: Mucous membranes are dry.      Pharynx: Oropharynx is clear.   Eyes:      Extraocular Movements: Extraocular movements intact.      Conjunctiva/sclera: Conjunctivae normal.      Pupils: Pupils are equal, round, and reactive to light.   Cardiovascular:      Rate and Rhythm: Normal rate and regular rhythm.      " Pulses: Normal pulses.      Heart sounds: Normal heart sounds.   Pulmonary:      Effort: Pulmonary effort is normal.      Breath sounds: Wheezing and rhonchi present.   Musculoskeletal:      Cervical back: Normal range of motion and neck supple.   Feet:      Comments:      Neurological:      Mental Status: She is alert.         Procedures      Assessment/Plan:   Diagnoses and all orders for this visit:    1. Hypertension, essential (Primary)  Assessment & Plan:  Discussed with patient to monitor their blood pressure and if systolic blood pressure goes above 140 or diastolic is above 90 to return to clinic.  Take medicines as directed, call for any problems, patient not having or any worrisome symptoms.          2. Hyperlipidemia, mixed  Assessment & Plan:  Blood work      3. Hyperglycemia  Assessment & Plan:  Blood work      4. Vitamin D deficiency  Assessment & Plan:  Blood work      5. Chronic bronchitis with pulmonary emphysema (HCC)  Assessment & Plan:  Patient has a mild exacerbation we will call in some doxycycline and prednisone taper.        Other orders  -     doxycycline (ADOXA) 100 MG tablet; Take 1 tablet by mouth 2 (Two) Times a Day.  Dispense: 20 tablet; Refill: 0  -     predniSONE (DELTASONE) 10 MG tablet; Take 1 tablet by mouth Daily. 5 po once a day for 2 days, 4 po once a day for 2 days, 3 po once a day for 2 days, 2 po once a day for 2 days, 1 po once a day for 2 days  Dispense: 30 tablet; Refill: 0           Follow Up:   Return in about 6 months (around 6/20/2023) for Annual physical.    Omer Diaz MD  [facility]

## 2022-12-21 ENCOUNTER — TELEPHONE (OUTPATIENT)
Dept: FAMILY MEDICINE CLINIC | Facility: CLINIC | Age: 77
End: 2022-12-21

## 2022-12-21 LAB — HCV AB S/CO SERPL IA: <0.1 S/CO RATIO (ref 0–0.9)

## 2022-12-21 RX ORDER — NITROFURANTOIN 25; 75 MG/1; MG/1
100 CAPSULE ORAL 2 TIMES DAILY
Qty: 20 CAPSULE | Refills: 0 | Status: SHIPPED | OUTPATIENT
Start: 2022-12-21 | End: 2023-01-31

## 2022-12-21 NOTE — TELEPHONE ENCOUNTER
Caller: Pilar Colon    Relationship: Self    Best call back number: [phone]    What is the best time to reach you: ANY     What was the call regarding: PILAR IS CALLING FOR THE NAME/ADDRESS OF THE NODULE DOCTOR YOU ARE REFERRING HER TO    Do you require a callback: YES

## 2022-12-21 NOTE — TELEPHONE ENCOUNTER
Caller: Pilar Colon    Relationship: Self    Best call back number: [phone]    What medication are you requesting: ANTI-BIOTIC    What are your current symptoms: KIDNEY PAIN/BURNING    How long have you been experiencing symptoms: 1 DAY    Have you had these symptoms before:    [x] Yes  [] No    Have you been treated for these symptoms before:   [] Yes  [x] No    If a prescription is needed, what is your preferred pharmacy and phone number: [facility] #[id number] - [place], [place] - [facility] [address]  AT [facility] [address] - [phone]  - [phone] FX     Additional notes: PLEASE CALL PILAR WHEN SENT IN

## 2022-12-30 ENCOUNTER — TELEPHONE (OUTPATIENT)
Dept: FAMILY MEDICINE CLINIC | Facility: CLINIC | Age: 77
End: 2022-12-30

## 2022-12-30 NOTE — TELEPHONE ENCOUNTER
Caller: Pilar Colon    Relationship: Self    Best call back number: [phone]    What was the call regarding:   PATIENT WOULD LIKE A CALL BACK REGARDING BEING INFORMED THAT SHE WAS BEING REFERRED TO A LUNG SPECIALIST FOR A NODULE IN HER LUNG AND WOULD LIKE A CALL BACK REGARDING THE STATUS AND GETTING THIS SCHEDULED     Do you require a callback: YES

## 2023-01-09 ENCOUNTER — TELEPHONE (OUTPATIENT)
Dept: FAMILY MEDICINE CLINIC | Facility: CLINIC | Age: 78
End: 2023-01-09
Payer: MEDICARE

## 2023-01-09 NOTE — TELEPHONE ENCOUNTER
Caller: Pilar Colon    Relationship: Self    Best call back number: [phone]    Requested Prescriptions:   furosemide (LASIX) 20 MG tablet    Pharmacy where request should be sent: [facility] #[id number] - [place], [place] - [facility] [address]  AT [facility] [address] - [phone]  - [phone] FX     Additional details provided by patient: PLEASE REFILL OR CALL TO ADVISE. THE PATIENT STATES THAT SHE NEEDS THIS IN ADDITION TO THE ANITBIOTIC THAT SHE PREVIOUSLY REQUESTED.     Does the patient have less than a 3 day supply:  [x] Yes  [] No    Would you like a call back once the refill request has been completed: [x] Yes [] No    If the office needs to give you a call back, can they leave a voicemail: [] Yes [x] No    Isaiah Pickering Rep   01/09/23 12:47 EST

## 2023-01-09 NOTE — TELEPHONE ENCOUNTER
Caller: Pilar Colon    Relationship: Self    Best call back number: [phone]    Requested Prescriptions:   ANTIBIOTICS    Pharmacy where request should be sent: [facility] #[id number] - [place], [place] - [address]  [address] [facility] [address] - [phone]  - [phone] FX     Additional details provided by patient: PATIENT IS IMPROVING BUT SHE SAYS HER ILLNESS IS CAUSING SOME SECONDARY ISSUES WITH THE SINUSES AND SHE IS VERY UNCOMFORTABLE    Does the patient have less than a 3 day supply:  [x] Yes  [] No    Would you like a call back once the refill request has been completed: [x] Yes [] No    If the office needs to give you a call back, can they leave a voicemail: [] Yes [x] No     Donn De La Garza, PCT   01/09/23 11:42 EST

## 2023-01-10 RX ORDER — FUROSEMIDE 20 MG/1
20 TABLET ORAL DAILY PRN
Qty: 30 TABLET | Refills: 2 | Status: SHIPPED | OUTPATIENT
Start: 2023-01-10

## 2023-01-18 ENCOUNTER — TELEPHONE (OUTPATIENT)
Dept: FAMILY MEDICINE CLINIC | Facility: CLINIC | Age: 78
End: 2023-01-18

## 2023-01-18 NOTE — TELEPHONE ENCOUNTER
Caller: Pilar Colon    Relationship: Self    Best call back number: [phone]    Requested Prescriptions:    PRAVASTATIN -NOT ON MED LIST     Pharmacy where request should be sent: [facility] - [place] [id number] [facility] - [phone]  - [phone] FX     Additional details provided by patient:     Does the patient have less than a 3 day supply:  [] Yes  [x] No    SiIsaiah Lugo Rep   01/18/23 12:17 EST

## 2023-01-30 ENCOUNTER — TELEPHONE (OUTPATIENT)
Dept: FAMILY MEDICINE CLINIC | Facility: CLINIC | Age: 78
End: 2023-01-30

## 2023-01-30 NOTE — TELEPHONE ENCOUNTER
Chief Complaint:     Patient had to cancel today's appt due to conflict w/ her schedule. Pt expressed she's been on the PRAVASTATIN for years due to her cholesterol. She asked for me to send a msg asking to refill it again. I read  reply, left on 1/18/22 as well.     [phone]

## 2023-01-31 DIAGNOSIS — I10 ESSENTIAL HYPERTENSION, BENIGN: ICD-10-CM

## 2023-01-31 RX ORDER — DILTIAZEM HYDROCHLORIDE 240 MG/1
240 CAPSULE, EXTENDED RELEASE ORAL DAILY
Qty: 90 CAPSULE | Refills: 1 | Status: SHIPPED | OUTPATIENT
Start: 2023-01-31

## 2023-01-31 RX ORDER — MECLIZINE HYDROCHLORIDE 25 MG/1
25 TABLET ORAL 3 TIMES DAILY PRN
Qty: 90 TABLET | Refills: 1 | Status: SHIPPED | OUTPATIENT
Start: 2023-01-31 | End: 2023-03-23 | Stop reason: SDUPTHER

## 2023-01-31 NOTE — TELEPHONE ENCOUNTER
Rx Refill Note    Requested Prescriptions     Pending Prescriptions Disp Refills   • meclizine (ANTIVERT) 25 MG tablet 90 tablet 1     Sig: Take 1 tablet by mouth 3 (Three) Times a Day As Needed for Dizziness or Nausea.   • dilTIAZem (TIAZAC) 240 MG 24 hr capsule 90 capsule 1     Sig: Take 1 capsule by mouth Daily.        Last office visit with prescribing clinician: 12/20/2022      Next office visit with prescribing clinician: Visit date not found   Last labs:   Last refill:    Pharmacy [facility]

## 2023-02-13 ENCOUNTER — OFFICE VISIT (OUTPATIENT)
Dept: FAMILY MEDICINE CLINIC | Facility: CLINIC | Age: 78
End: 2023-02-13
Payer: MEDICARE

## 2023-02-13 VITALS
HEART RATE: 78 BPM | BODY MASS INDEX: 24.46 KG/M2 | RESPIRATION RATE: 14 BRPM | DIASTOLIC BLOOD PRESSURE: 70 MMHG | SYSTOLIC BLOOD PRESSURE: 110 MMHG | HEIGHT: 60 IN | WEIGHT: 124.6 LBS | OXYGEN SATURATION: 99 % | TEMPERATURE: 97.2 F

## 2023-02-13 DIAGNOSIS — N18.31 CHRONIC KIDNEY DISEASE, STAGE 3A: ICD-10-CM

## 2023-02-13 DIAGNOSIS — J43.9 PULMONARY EMPHYSEMA, UNSPECIFIED EMPHYSEMA TYPE: ICD-10-CM

## 2023-02-13 DIAGNOSIS — E78.2 HYPERLIPIDEMIA, MIXED: ICD-10-CM

## 2023-02-13 DIAGNOSIS — R30.0 DYSURIA: Primary | ICD-10-CM

## 2023-02-13 DIAGNOSIS — M54.50 LOW BACK PAIN WITHOUT SCIATICA, UNSPECIFIED BACK PAIN LATERALITY, UNSPECIFIED CHRONICITY: ICD-10-CM

## 2023-02-13 LAB
BILIRUB BLD-MCNC: NEGATIVE MG/DL
CLARITY, POC: CLEAR
COLOR UR: YELLOW
EXPIRATION DATE: NORMAL
GLUCOSE UR STRIP-MCNC: NEGATIVE MG/DL
KETONES UR QL: NEGATIVE
LEUKOCYTE EST, POC: NEGATIVE
Lab: NORMAL
NITRITE UR-MCNC: NEGATIVE MG/ML
PH UR: 5.5 [PH] (ref 5–8)
PROT UR STRIP-MCNC: NEGATIVE MG/DL
RBC # UR STRIP: NEGATIVE /UL
SP GR UR: 1.03 (ref 1–1.03)
UROBILINOGEN UR QL: NORMAL

## 2023-02-13 PROCEDURE — 99214 OFFICE O/P EST MOD 30 MIN: CPT | Performed by: FAMILY MEDICINE

## 2023-02-13 PROCEDURE — 81003 URINALYSIS AUTO W/O SCOPE: CPT | Performed by: FAMILY MEDICINE

## 2023-02-13 NOTE — PROGRESS NOTES
Patient Name: Pilar Colon  : 1945   MRN: [id number]     Chief Complaint:    Chief Complaint   Patient presents with   • Flank Pain     Pt states she is having right sided pain by her kidney        History of Present Illness: Pilar Colon is a 77 y.o. female who is here today for follow up.  HPI        Review of Systems:   Review of Systems   Constitutional: Negative.    HENT: Negative.    Eyes: Negative.    Respiratory: Negative.    Cardiovascular: Negative.    Gastrointestinal: Negative.    Neurological: Negative.         Past Medical History:   Past Medical History:   Diagnosis Date   • Anemia    • Anxiety    • Arthritis    • Asthma    • Bronchitis    • Chronic bronchitis with pulmonary emphysema (HCC) 2015   • COPD mixed type (HCC)    • Coronary arteriosclerosis in native artery 2010   • Depression    • Diverticulitis    • Dupuytren contracture    • Emphysema (subcutaneous) (surgical) resulting from a procedure    • High risk medication use    • History of degenerative disc disease     spine   • History of left heart catheterization     STENT PLACED   • Hx of adenomatous colonic polyps    • Hyperlipidemia    • Hypertension 2014   • Low back pain    • Mixed hyperlipidemia 2014   • Osteoporosis    • Paroxysmal A-fib (HCC)    • Peripheral polyneuropathy    • Personal history of nicotine dependence    • Pneumonia    • 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 Severo Boyer   • BREAST LUMPECTOMY     • BREAST LUMPECTOMY Left    • HAND SURGERY Left     Arthritis surgery, [place], [place]   • SHOULDER SURGERY Bilateral     [place]. Dr. Dixon   • SHOULDER SURGERY Bilateral    • TUBAL ABDOMINAL LIGATION         Family History:   Family  History   Problem Relation Age of Onset   • Liver cancer Father    • Diabetes Sister 53   • Heart disease Sister 59        Undefined       Social History:   Social History     Socioeconomic History   • Marital status: Single   Tobacco Use   • Smoking status: Former     Packs/day: 0.25     Years: 48.00     Pack years: 12.00     Types: Cigarettes     Start date:      Quit date:      Years since quittin.1   • Smokeless tobacco: Never   • Tobacco comments:     Pt started smoking in  quit for 15 years and started back    Vaping Use   • Vaping Use: Never used   Substance and Sexual Activity   • Alcohol use: Yes     Comment: 1 Pint per week, SATURDAY NIGHT LAST DRINK    • Drug use: Never   • Sexual activity: Defer       Medications:     Current Outpatient Medications:   •  apixaban (ELIQUIS) 5 MG tablet tablet, Take 1 tablet by mouth 2 (Two) Times a Day., Disp: 180 tablet, Rfl: 0  •  Aspirin 81 MG capsule, Take 81 mg by mouth Daily. OTC, Disp: , Rfl:   •  baclofen (LIORESAL) 10 MG tablet, Take 10 mg by mouth 2 (Two) Times a Day., Disp: , Rfl:   •  budesonide (PULMICORT) 0.5 MG/2ML nebulizer solution, Use 1 nebule (2mL) by nebulization Daily., Disp: 60 mL, Rfl: 1  •  busPIRone (BUSPAR) 10 MG tablet, Take 1 tablet by mouth 2 (Two) Times a Day., Disp: 180 tablet, Rfl: 0  •  dilTIAZem (TIAZAC) 240 MG 24 hr capsule, Take 1 capsule by mouth Daily., Disp: 90 capsule, Rfl: 1  •  furosemide (LASIX) 20 MG tablet, Take 1 tablet by mouth Daily As Needed (swelling)., Disp: 30 tablet, Rfl: 2  •  gabapentin (NEURONTIN) 400 MG capsule, Take 400 mg by mouth 3 (Three) Times a Day., Disp: , Rfl:   •  guaiFENesin (MUCINEX) 600 MG 12 hr tablet, Take 2 tablets by mouth Every 12 (Twelve) Hours., Disp: 120 tablet, Rfl: 0  •  HYDROcodone-acetaminophen (NORCO)  MG per tablet, TAKE 1 TABLET BY MOUTH EVERY 6 HOURS. DO NOT FILL ANY EARLIER THAN 30 DAYS, Disp: , Rfl:   •  ipratropium-albuterol (DUO-NEB) 0.5-2.5 mg/3 ml  "nebulizer, Take 3 mL by nebulization Every 4 (Four) Hours As Needed for Wheezing or Shortness of Air., Disp: 360 mL, Rfl: 0  •  losartan (COZAAR) 50 MG tablet, Take 1 tablet by mouth Daily., Disp: 90 tablet, Rfl: 0  •  meclizine (ANTIVERT) 25 MG tablet, Take 1 tablet by mouth 3 (Three) Times a Day As Needed for Dizziness or Nausea., Disp: 90 tablet, Rfl: 1  •  omeprazole (priLOSEC) 40 MG capsule, Take 1 capsule by mouth Daily., Disp: 90 capsule, Rfl: 0  •  PARoxetine (PAXIL) 40 MG tablet, Take 1 tablet by mouth Every Morning., Disp: 90 tablet, Rfl: 0  •  predniSONE (DELTASONE) 10 MG tablet, Take 1 tablet by mouth Daily. 5 po once a day for 2 days, 4 po once a day for 2 days, 3 po once a day for 2 days, 2 po once a day for 2 days, 1 po once a day for 2 days, Disp: 30 tablet, Rfl: 0  •  rosuvastatin (CRESTOR) 40 MG tablet, Take 1 tablet by mouth Daily., Disp: 90 tablet, Rfl: 3    Allergies:   Allergies   Allergen Reactions   • Tramadol Swelling         Physical Exam:  Vital Signs:   Vitals:    02/13/23 1313   BP: 110/70   BP Location: Left arm   Patient Position: Sitting   Cuff Size: Adult   Pulse: 78   Resp: 14   Temp: 97.2 °F (36.2 °C)   TempSrc: Temporal   SpO2: 99%   Weight: 56.5 kg (124 lb 9.6 oz)   Height: 152.4 cm (60\")   PainSc: 0-No pain     Body mass index is 24.33 kg/m².     Physical Exam  Vitals and nursing note reviewed.   Constitutional:       Appearance: Normal appearance. She is normal weight.   HENT:      Head: Normocephalic and atraumatic.      Right Ear: Tympanic membrane, ear canal and external ear normal.      Left Ear: Tympanic membrane, ear canal and external ear normal.      Nose: Nose normal.      Mouth/Throat:      Mouth: Mucous membranes are dry.      Pharynx: Oropharynx is clear.   Eyes:      Extraocular Movements: Extraocular movements intact.      Conjunctiva/sclera: Conjunctivae normal.      Pupils: Pupils are equal, round, and reactive to light.   Cardiovascular:      Rate and Rhythm: " Normal rate and regular rhythm.      Pulses: Normal pulses.      Heart sounds: Normal heart sounds.   Pulmonary:      Effort: Pulmonary effort is normal.      Breath sounds: Normal breath sounds.   Musculoskeletal:      Cervical back: Normal range of motion and neck supple.   Feet:      Comments:      Neurological:      Mental Status: She is alert.         Procedures      Assessment/Plan:   Diagnoses and all orders for this visit:    1. Dysuria (Primary)  Assessment & Plan:  Urinalysis was normal.  We will do a urine culture.    Orders:  -     Urine Culture - Urine, Urine, Clean Catch; Future  -     Urine Culture - Urine, Urine, Clean Catch  -     POCT urinalysis dipstick, automated  -     Urine Culture - Urine, Urine, Clean Catch; Future  -     Lipid Panel; Future  -     Comprehensive Metabolic Panel; Future  -     CBC & Differential; Future    2. Chronic kidney disease, stage 3a (HCC)  Assessment & Plan:  Blood work in 3 months.Patient is instructed to not take any NSAIDs.  Medicines as directed.  Stay well-hydrated.      Orders:  -     Lipid Panel; Future  -     Comprehensive Metabolic Panel; Future  -     CBC & Differential; Future    3. Hyperlipidemia, mixed  Assessment & Plan:  Blood work in 3 months.    Orders:  -     Lipid Panel; Future  -     Comprehensive Metabolic Panel; Future  -     CBC & Differential; Future    4. Pulmonary emphysema, unspecified emphysema type (HCC)  Assessment & Plan:  Patient has quit smoking 3 months ago.  States her breathing is a bit better.  We will follow.      Orders:  -     Lipid Panel; Future  -     Comprehensive Metabolic Panel; Future  -     CBC & Differential; Future    5. Low back pain without sciatica, unspecified back pain laterality, unspecified chronicity  Assessment & Plan:  Patient has some pain in her right lower back.  Urinalysis was okay.  She is to take Tylenol.  Do some stretching.    Orders:  -     Lipid Panel; Future  -     Comprehensive Metabolic Panel;  Future  -     CBC & Differential; Future           Follow Up:   Return in about 3 months (around 5/13/2023) for Bloodwork 1 week prior to next appointment.    Omer Diaz MD  [facility]

## 2023-02-13 NOTE — ASSESSMENT & PLAN NOTE
Patient has some pain in her right lower back.  Urinalysis was okay.  She is to take Tylenol.  Do some stretching.

## 2023-02-13 NOTE — ASSESSMENT & PLAN NOTE
Blood work in 3 months.Patient is instructed to not take any NSAIDs.  Medicines as directed.  Stay well-hydrated.

## 2023-02-14 ENCOUNTER — TELEPHONE (OUTPATIENT)
Dept: FAMILY MEDICINE CLINIC | Facility: CLINIC | Age: 78
End: 2023-02-14
Payer: MEDICARE

## 2023-02-14 RX ORDER — CYCLOBENZAPRINE HCL 10 MG
10 TABLET ORAL 3 TIMES DAILY PRN
Qty: 30 TABLET | Refills: 1 | Status: SHIPPED | OUTPATIENT
Start: 2023-02-14

## 2023-02-14 NOTE — TELEPHONE ENCOUNTER
Called and talked to patient.  Patient has history of CKD.  I Mere give her some cyclobenzaprine and have her take Tylenol with it.  She is to drink plenty of fluids.  I told her avoid NSAIDs

## 2023-02-14 NOTE — TELEPHONE ENCOUNTER
Caller: Pilar Colon    Relationship: Self    Best call back number: [phone]    What medication are you requesting: MEDICATION FOR OSTEOPOROSIS    What are your current symptoms: LOWER BACK, HIPS AND LEG PAIN    How long have you been experiencing symptoms: A LONG TIME    Have you had these symptoms before:    [] Yes  [] No    Have you been treated for these symptoms before:   [] Yes  [x] No    If a prescription is needed, what is your preferred pharmacy and phone number:      [facility] #[id number] [place] [address] [address]  AT [facility] [address] - [phone]  - [phone]     Additional notes:    PATIENT REQUESTING MEDICATION TO BE CALLED IN

## 2023-02-15 LAB
BACTERIA UR CULT: NO GROWTH
BACTERIA UR CULT: NORMAL

## 2023-03-17 RX ORDER — PAROXETINE HYDROCHLORIDE 40 MG/1
40 TABLET, FILM COATED ORAL EVERY MORNING
Qty: 90 TABLET | Refills: 0 | Status: SHIPPED | OUTPATIENT
Start: 2023-03-17

## 2023-03-17 NOTE — TELEPHONE ENCOUNTER
Caller: Pilar Colon FRANKO    Relationship: Self    Best call back number: [phone]    Requested Prescriptions:   Requested Prescriptions     Pending Prescriptions Disp Refills   • PARoxetine (PAXIL) 40 MG tablet 90 tablet 0     Sig: Take 1 tablet by mouth Every Morning.      [facility]    Pharmacy where request should be sent: [facility] #[id number] - [place], [place] - [address]  AT [facility] [address] - [phone]  - [phone] FX     Additional details provided by patient: ASKING FOR BOTH MED REFILL. ALSO A CALL BACK TO DISCUSS LEGS.    MEDICAL CONCERN: KIDNEY AND LEGS ARE NOT WORKING RIGHT, LEGS HURTING AND BURNING SO BAD SHE CAN'T STAND ON THEM. PLEASE CALL    Does the patient have less than a 3 day supply:  [x] Yes  [] No    Would you like a call back once the refill request has been completed: [x] Yes [] No    If the office needs to give you a call back, can they leave a voicemail: [x] Yes [] No    Isaiah Francisco Rep   03/17/23 09:45 EDT

## 2023-03-17 NOTE — TELEPHONE ENCOUNTER
Rx Refill Note    Requested Prescriptions     Pending Prescriptions Disp Refills   • PARoxetine (PAXIL) 40 MG tablet 90 tablet 0     Sig: Take 1 tablet by mouth Every Morning.        Last office visit with prescribing clinician: 2/13/2023      Next office visit with prescribing clinician: 5/10/2023   Last labs:   Last refill:    Pharmacy [facility]  Dr. Diaz pt she was last seen 2/13/2023 and has a 6 month follow up on 5/10/2023

## 2023-03-23 ENCOUNTER — TELEPHONE (OUTPATIENT)
Dept: FAMILY MEDICINE CLINIC | Facility: CLINIC | Age: 78
End: 2023-03-23
Payer: MEDICARE

## 2023-03-23 DIAGNOSIS — K21.9 GASTROESOPHAGEAL REFLUX DISEASE WITHOUT ESOPHAGITIS: ICD-10-CM

## 2023-03-23 RX ORDER — MECLIZINE HYDROCHLORIDE 25 MG/1
25 TABLET ORAL 3 TIMES DAILY PRN
Qty: 90 TABLET | Refills: 0 | Status: SHIPPED | OUTPATIENT
Start: 2023-03-23

## 2023-03-23 RX ORDER — PRAVASTATIN SODIUM 40 MG
40 TABLET ORAL DAILY
COMMUNITY
End: 2023-03-23 | Stop reason: SDUPTHER

## 2023-03-23 RX ORDER — OMEPRAZOLE 40 MG/1
40 CAPSULE, DELAYED RELEASE ORAL DAILY
Qty: 90 CAPSULE | Refills: 0 | Status: SHIPPED | OUTPATIENT
Start: 2023-03-23

## 2023-03-23 RX ORDER — LOSARTAN POTASSIUM 50 MG/1
50 TABLET ORAL DAILY
Qty: 90 TABLET | Refills: 0 | Status: SHIPPED | OUTPATIENT
Start: 2023-03-23

## 2023-03-23 RX ORDER — BUSPIRONE HYDROCHLORIDE 10 MG/1
10 TABLET ORAL 2 TIMES DAILY
Qty: 180 TABLET | Refills: 0 | Status: SHIPPED | OUTPATIENT
Start: 2023-03-23

## 2023-03-23 RX ORDER — CHLORCYCLIZINE HYDROCHLORIDE AND PSEUDOEPHEDRINE HYDROCHLORIDE 25; 60 MG/1; MG/1
1 TABLET ORAL 2 TIMES DAILY
Qty: 10 TABLET | Refills: 0 | Status: SHIPPED | OUTPATIENT
Start: 2023-03-23

## 2023-03-23 RX ORDER — PRAVASTATIN SODIUM 40 MG
40 TABLET ORAL DAILY
Qty: 90 TABLET | Refills: 0 | Status: SHIPPED | OUTPATIENT
Start: 2023-03-23

## 2023-03-23 NOTE — TELEPHONE ENCOUNTER
Caller: Pilar Colon    Relationship: Self    Best call back number: [phone]  What medication are you requesting: SOMETHING FOR SINUS INFECTION     What are your current symptoms: CONGESTION, MUCUS, SCABS IN NOSE   How long have you been experiencing symptoms:   A WEEK     Have you had these symptoms before:    [x] Yes  [] No    Have you been treated for these symptoms before:   [x] Yes  [] No    If a prescription is needed, what is your preferred pharmacy and phone number: [facility] #[id number] [place], [place] - [facility] [address]  AT [facility] [address] - [phone]  - [phone] FX     Additional notes:  WOULD LIKE SOMETHING CALLED IN, PLEASE CALL EITHER WAY TO LET HER KNOW

## 2023-03-23 NOTE — TELEPHONE ENCOUNTER
Caller: Pilar Colon    Relationship: Self    Best call back number: [phone]  Requested Prescriptions:   Requested Prescriptions     Pending Prescriptions Disp Refills   • busPIRone (BUSPAR) 10 MG tablet 180 tablet 0     Sig: Take 1 tablet by mouth 2 (Two) Times a Day.   • meclizine (ANTIVERT) 25 MG tablet 90 tablet 1     Sig: Take 1 tablet by mouth 3 (Three) Times a Day As Needed for Dizziness or Nausea.   • pravastatin (PRAVACHOL) 40 MG tablet 90 tablet      Sig: Take 1 tablet by mouth Daily.   • omeprazole (priLOSEC) 40 MG capsule 90 capsule 0     Sig: Take 1 capsule by mouth Daily.   • losartan (COZAAR) 50 MG tablet 90 tablet 0     Sig: Take 1 tablet by mouth Daily.        Pharmacy where request should be sent: [facility] #[id number] - [place], [place] - [address] [facility] [address] [facility] [address] - [phone]  - [phone] FX     Last office visit with prescribing clinician: 2/13/2023   Last telemedicine visit with prescribing clinician: 5/10/2023   Next office visit with prescribing clinician: 5/10/2023     Additional details provided by patient: PLEASE REFILL OUT OF MEDICATION     Does the patient have less than a 3 day supply:  [x] Yes  [] No    Would you like a call back once the refill request has been completed: [x] Yes [] No    If the office needs to give you a call back, can they leave a voicemail: [x] Yes [] No    Isaiah Enrique   03/23/23 09:49 EDT

## 2023-04-17 RX ORDER — FUROSEMIDE 20 MG/1
TABLET ORAL
Qty: 30 TABLET | Refills: 0 | Status: SHIPPED | OUTPATIENT
Start: 2023-04-17

## 2023-04-17 NOTE — TELEPHONE ENCOUNTER
Rx Refill Note    Requested Prescriptions     Pending Prescriptions Disp Refills   • furosemide (LASIX) 20 MG tablet [Pharmacy Med Name: FUROSEMIDE 20MG TABLETS] 30 tablet 0     Sig: TAKE 1 TABLET BY MOUTH DAILY AS NEEDED FOR SWELLING        Last office visit with prescribing clinician: 2/13/2023      Next office visit with prescribing clinician: 5/10/2023   Last labs:   Last refill: 01/10/2023   Pharmacy (be sure to add in Epic). correct

## 2023-04-19 ENCOUNTER — TELEPHONE (OUTPATIENT)
Dept: FAMILY MEDICINE CLINIC | Facility: CLINIC | Age: 78
End: 2023-04-19
Payer: MEDICARE

## 2023-04-19 DIAGNOSIS — M54.50 CHRONIC MIDLINE LOW BACK PAIN, UNSPECIFIED WHETHER SCIATICA PRESENT: Primary | ICD-10-CM

## 2023-04-19 DIAGNOSIS — G89.29 CHRONIC MIDLINE LOW BACK PAIN, UNSPECIFIED WHETHER SCIATICA PRESENT: Primary | ICD-10-CM

## 2023-04-19 NOTE — TELEPHONE ENCOUNTER
PATIENT STATES THE PAIN IN HER BACK GETS WORSE DAILY.  SHE WOULD LIKE AN MRI.     PLEASE CALL [phone]

## 2023-04-20 ENCOUNTER — TELEPHONE (OUTPATIENT)
Dept: FAMILY MEDICINE CLINIC | Facility: CLINIC | Age: 78
End: 2023-04-20

## 2023-04-20 NOTE — TELEPHONE ENCOUNTER
She is wanting a MRI done - she was crying she is in bad head and left back pain and is requesting a MRI . Would like for Dr. Diaz or Rafael to give her a call, so she could explain.

## 2023-04-20 NOTE — TELEPHONE ENCOUNTER
I sent in an order for an LS spine.  Patient needs to get this done.  She then needs to make appointment at which time we will go over results of x-ray and see if an MRI is warranted.

## 2023-04-21 ENCOUNTER — TELEPHONE (OUTPATIENT)
Dept: FAMILY MEDICINE CLINIC | Facility: CLINIC | Age: 78
End: 2023-04-21

## 2023-05-10 ENCOUNTER — OFFICE VISIT (OUTPATIENT)
Dept: FAMILY MEDICINE CLINIC | Facility: CLINIC | Age: 78
End: 2023-05-10
Payer: MEDICARE

## 2023-05-10 VITALS
HEIGHT: 60 IN | BODY MASS INDEX: 24.54 KG/M2 | SYSTOLIC BLOOD PRESSURE: 130 MMHG | HEART RATE: 80 BPM | OXYGEN SATURATION: 96 % | RESPIRATION RATE: 14 BRPM | TEMPERATURE: 98.1 F | WEIGHT: 125 LBS | DIASTOLIC BLOOD PRESSURE: 80 MMHG

## 2023-05-10 DIAGNOSIS — M54.50 LOW BACK PAIN WITHOUT SCIATICA, UNSPECIFIED BACK PAIN LATERALITY, UNSPECIFIED CHRONICITY: ICD-10-CM

## 2023-05-10 DIAGNOSIS — I48.0 PAROXYSMAL ATRIAL FIBRILLATION: ICD-10-CM

## 2023-05-10 DIAGNOSIS — J43.9 PULMONARY EMPHYSEMA, UNSPECIFIED EMPHYSEMA TYPE: ICD-10-CM

## 2023-05-10 DIAGNOSIS — N18.31 CHRONIC KIDNEY DISEASE, STAGE 3A: ICD-10-CM

## 2023-05-10 DIAGNOSIS — I10 ESSENTIAL HYPERTENSION, BENIGN: ICD-10-CM

## 2023-05-10 DIAGNOSIS — E55.9 VITAMIN D DEFICIENCY: ICD-10-CM

## 2023-05-10 DIAGNOSIS — R30.0 DYSURIA: ICD-10-CM

## 2023-05-10 DIAGNOSIS — Z00.00 MEDICARE ANNUAL WELLNESS VISIT, SUBSEQUENT: Primary | ICD-10-CM

## 2023-05-10 DIAGNOSIS — I10 HYPERTENSION, ESSENTIAL: ICD-10-CM

## 2023-05-10 DIAGNOSIS — R73.9 HYPERGLYCEMIA: ICD-10-CM

## 2023-05-10 DIAGNOSIS — E78.2 HYPERLIPIDEMIA, MIXED: ICD-10-CM

## 2023-05-10 PROBLEM — M51.15: Status: ACTIVE | Noted: 2023-05-10

## 2023-05-10 PROBLEM — M47.814 THORACIC SPONDYLOSIS WITHOUT MYELOPATHY: Status: ACTIVE | Noted: 2023-05-10

## 2023-05-10 PROBLEM — S22.000A WEDGE COMPRESSION FRACTURE OF UNSPECIFIED THORACIC VERTEBRA, INITIAL ENCOUNTER FOR CLOSED FRACTURE: Status: ACTIVE | Noted: 2023-05-10

## 2023-05-10 PROBLEM — F17.200 NICOTINE DEPENDENCE: Status: ACTIVE | Noted: 2023-05-10

## 2023-05-10 PROBLEM — M54.6 PAIN IN THORACIC SPINE: Status: ACTIVE | Noted: 2023-05-10

## 2023-05-10 PROBLEM — Z79.899 OTHER LONG TERM (CURRENT) DRUG THERAPY: Status: ACTIVE | Noted: 2023-05-10

## 2023-05-10 PROBLEM — M54.14 THORACIC RADICULOPATHY: Status: ACTIVE | Noted: 2023-05-10

## 2023-05-10 PROBLEM — M84.40XA PATHOLOGICAL FRACTURE: Status: ACTIVE | Noted: 2023-05-10

## 2023-05-10 PROBLEM — G60.9 HEREDITARY AND IDIOPATHIC NEUROPATHY, UNSPECIFIED: Status: ACTIVE | Noted: 2023-05-10

## 2023-05-10 PROBLEM — R25.2 SPASM: Status: ACTIVE | Noted: 2023-05-10

## 2023-05-10 PROBLEM — M47.817 LUMBOSACRAL SPONDYLOSIS WITHOUT MYELOPATHY: Status: ACTIVE | Noted: 2023-05-10

## 2023-05-10 RX ORDER — DILTIAZEM HYDROCHLORIDE 240 MG/1
240 CAPSULE, EXTENDED RELEASE ORAL DAILY
Qty: 90 CAPSULE | Refills: 1 | Status: SHIPPED | OUTPATIENT
Start: 2023-05-10

## 2023-05-10 NOTE — ASSESSMENT & PLAN NOTE
Patient has a history of A-fib.  And she has been on Eliquis in the past.  She is only taking an aspirin now.  I am going to get a referral to see cardiology to see if Eliquis is an option for her.  EKG showed normal sinus rhythm at this time.

## 2023-05-10 NOTE — PROGRESS NOTES
The ABCs of the Annual Wellness Visit  Subsequent Medicare Wellness Visit    Chief Complaint   Patient presents with   • Medicare Wellness-subsequent     Pt here for medicare wellness exam sub   • Med Refill     Pt here for medication refills and recheck      Subjective    History of Present Illness:  Pilar Colon is a 77 y.o. female who presents for a Subsequent Medicare Wellness Visit.    The following portions of the patient's history were reviewed and   updated as appropriate: allergies, current medications, past family history, past medical history, past social history, past surgical history and problem list.    Compared to one year ago, the patient feels her physical   health is worse.    Compared to one year ago, the patient feels her mental   health is the same.    Recent Hospitalizations:  She was not admitted to the hospital during the last year.       Current Medical Providers:  Patient Care Team:  Omer Diaz MD as PCP - General (Family Medicine)  Vick Lou MD as Consulting Physician (Anesthesiology)  Rubin Leija MD as Consulting Physician (Cardiology)  Vick Stallworth MD (Pain Medicine)    Outpatient Medications Prior to Visit   Medication Sig Dispense Refill   • apixaban (ELIQUIS) 5 MG tablet tablet Take 1 tablet by mouth 2 (Two) Times a Day. 180 tablet 0   • Aspirin 81 MG capsule Take 81 mg by mouth Daily. OTC     • baclofen (LIORESAL) 10 MG tablet Take 1 tablet by mouth 2 (Two) Times a Day.     • budesonide (PULMICORT) 0.5 MG/2ML nebulizer solution Use 1 nebule (2mL) by nebulization Daily. 60 mL 1   • busPIRone (BUSPAR) 10 MG tablet Take 1 tablet by mouth 2 (Two) Times a Day. 180 tablet 0   • Chlorcyclizine-Pseudoephed (Stahist AD) 25-60 MG tablet Take 1 tablet by mouth 2 (Two) Times a Day. 10 tablet 0   • cyclobenzaprine (FLEXERIL) 10 MG tablet Take 1 tablet by mouth 3 (Three) Times a Day As Needed for Muscle Spasms. 30 tablet 1   • furosemide (LASIX) 20 MG tablet TAKE 1  TABLET BY MOUTH DAILY AS NEEDED FOR SWELLING 30 tablet 0   • gabapentin (NEURONTIN) 400 MG capsule Take 1 capsule by mouth 3 (Three) Times a Day.     • guaiFENesin (MUCINEX) 600 MG 12 hr tablet Take 2 tablets by mouth Every 12 (Twelve) Hours. 120 tablet 0   • HYDROcodone-acetaminophen (NORCO)  MG per tablet TAKE 1 TABLET BY MOUTH EVERY 6 HOURS. DO NOT FILL ANY EARLIER THAN 30 DAYS     • ipratropium-albuterol (DUO-NEB) 0.5-2.5 mg/3 ml nebulizer Take 3 mL by nebulization Every 4 (Four) Hours As Needed for Wheezing or Shortness of Air. 360 mL 0   • losartan (COZAAR) 50 MG tablet Take 1 tablet by mouth Daily. 90 tablet 0   • meclizine (ANTIVERT) 25 MG tablet Take 1 tablet by mouth 3 (Three) Times a Day As Needed for Dizziness or Nausea. 90 tablet 0   • omeprazole (priLOSEC) 40 MG capsule Take 1 capsule by mouth Daily. 90 capsule 0   • PARoxetine (PAXIL) 40 MG tablet Take 1 tablet by mouth Every Morning. 90 tablet 0   • pravastatin (PRAVACHOL) 40 MG tablet Take 1 tablet by mouth Daily. 90 tablet 0   • predniSONE (DELTASONE) 10 MG tablet Take 1 tablet by mouth Daily. 5 po once a day for 2 days, 4 po once a day for 2 days, 3 po once a day for 2 days, 2 po once a day for 2 days, 1 po once a day for 2 days 30 tablet 0   • dilTIAZem (TIAZAC) 240 MG 24 hr capsule Take 1 capsule by mouth Daily. 90 capsule 1     No facility-administered medications prior to visit.       Opioid medication/s are on active medication list.  and I have evaluated her active treatment plan and pain score trends (see table).  Vitals:    05/10/23 1236   PainSc: 0-No pain     I have reviewed the chart for potential of high risk medication and harmful drug interactions in the elderly.            Aspirin is on active medication list. Aspirin use is contraindicated for this patient due to: current use of Eliquis.  Patient has been instructed to discontinue this medication. .      Patient Active Problem List   Diagnosis   • Anxiety disorder   •  "Depression   • Hyperlipidemia, mixed   • Essential hypertension, benign   • Low back pain without sciatica   • Bilateral hip pain   • Pulmonary emphysema   • Coronary artery disease involving native coronary artery of native heart   • Paroxysmal atrial fibrillation   • Chronic bronchitis with pulmonary emphysema   • Dupuytren's contracture   • H/O: drug dependency   • High risk medication use   • Polyneuropathy   • Recurrent major depression in partial remission   • Vertigo   • Vitamin D deficiency   • Hyperglycemia   • Chronic kidney disease, stage 3a   • Adjustment disorder with mixed anxiety and depressed mood   • Dysuria   • Medicare annual wellness visit, subsequent   • Hereditary and idiopathic neuropathy, unspecified   • Intervertebral disc disorders with radiculopathy, thoracolumbar region   • Lumbosacral spondylosis without myelopathy   • Nicotine dependence   • Other long term (current) drug therapy   • Pain in thoracic spine   • Pathological fracture   • Spasm   • Thoracic radiculopathy   • Thoracic spondylosis without myelopathy   • Wedge compression fracture of unspecified thoracic vertebra, initial encounter for closed fracture     Advance Care Planning  Advance Directive is not on file.  ACP discussion was held with the patient during this visit. Patient does not have an advance directive, information provided.    Review of Systems   Constitutional: Negative.    HENT: Negative.    Eyes: Negative.    Respiratory: Negative.    Cardiovascular: Negative.    Gastrointestinal: Negative.         Objective    Vitals:    05/10/23 1236   BP: 130/80   BP Location: Left arm   Patient Position: Sitting   Cuff Size: Adult   Pulse: 80   Resp: 14   Temp: 98.1 °F (36.7 °C)   TempSrc: Temporal   SpO2: 96%   Weight: 56.7 kg (125 lb)   Height: 152.4 cm (60\")   PainSc: 0-No pain     Estimated body mass index is 24.41 kg/m² as calculated from the following:    Height as of this encounter: 152.4 cm (60\").    Weight as of " this encounter: 56.7 kg (125 lb).    BMI is within normal parameters. No other follow-up for BMI required.      Does the patient have evidence of cognitive impairment? No    Physical Exam  Vitals and nursing note reviewed.   Constitutional:       Appearance: Normal appearance. She is normal weight.   HENT:      Head: Normocephalic and atraumatic.      Right Ear: Tympanic membrane, ear canal and external ear normal.      Left Ear: Tympanic membrane, ear canal and external ear normal.      Nose: Nose normal.      Mouth/Throat:      Mouth: Mucous membranes are dry.      Pharynx: Oropharynx is clear.   Eyes:      Extraocular Movements: Extraocular movements intact.      Conjunctiva/sclera: Conjunctivae normal.      Pupils: Pupils are equal, round, and reactive to light.   Cardiovascular:      Rate and Rhythm: Normal rate and regular rhythm.      Pulses: Normal pulses.      Heart sounds: Normal heart sounds.   Pulmonary:      Effort: Pulmonary effort is normal.      Breath sounds: Normal breath sounds.   Musculoskeletal:      Cervical back: Normal range of motion and neck supple.   Feet:      Comments:      Neurological:      Mental Status: She is alert.                 HEALTH RISK ASSESSMENT    Smoking Status:  Social History     Tobacco Use   Smoking Status Former   • Packs/day: 0.25   • Years: 48.00   • Pack years: 12.00   • Types: Cigarettes   • Start date:    • Quit date:    • Years since quittin.3   Smokeless Tobacco Never   Tobacco Comments    Pt started smoking in  quit for 15 years and started back      Alcohol Consumption:  Social History     Substance and Sexual Activity   Alcohol Use Yes    Comment: 1 Pint per week, SATURDAY NIGHT LAST DRINK      Fall Risk Screen:    STEADI Fall Risk Assessment was completed, and patient is at LOW risk for falls.Assessment completed on:5/10/2023    Depression Screenin/10/2023    12:41 PM   PHQ-2/PHQ-9 Depression Screening   Little Interest or  Pleasure in Doing Things 0-->not at all   Feeling Down, Depressed or Hopeless 0-->not at all   PHQ-9: Brief Depression Severity Measure Score 0       Health Habits and Functional and Cognitive Screenin/10/2023    12:41 PM   Functional & Cognitive Status   Do you have difficulty preparing food and eating? No   Do you have difficulty bathing yourself, getting dressed or grooming yourself? No   Do you have difficulty using the toilet? No   Do you have difficulty moving around from place to place? Yes   Do you have trouble with steps or getting out of a bed or a chair? Yes   Current Diet Well Balanced Diet   Dental Exam Up to date   Eye Exam Up to date   Exercise (times per week) 0 times per week   Current Exercises Include No Regular Exercise   Do you need help using the phone?  No   Are you deaf or do you have serious difficulty hearing?  No   Do you need help with transportation? Yes   Do you need help shopping? No   Do you need help preparing meals?  No   Do you need help with housework?  No   Do you need help with laundry? No   Do you need help taking your medications? No   Do you need help managing money? No   Do you ever drive or ride in a car without wearing a seat belt? No   Have you felt unusual stress, anger or loneliness in the last month? Yes   Who do you live with? Child   If you need help, do you have trouble finding someone available to you? No   Have you been bothered in the last four weeks by sexual problems? No   Do you have difficulty concentrating, remembering or making decisions? Yes       Age-appropriate Screening Schedule:  Refer to the list below for future screening recommendations based on patient's age, sex and/or medical conditions. Orders for these recommended tests are listed in the plan section. The patient has been provided with a written plan.    Health Maintenance   Topic Date Due   • ZOSTER VACCINE (1 of 2) Never done   • DXA SCAN  05/10/2023 (Originally 2022)   • TDAP/TD  VACCINES (2 - Td or Tdap) 09/20/2023 (Originally 5/3/2021)   • INFLUENZA VACCINE  08/01/2023   • LIPID PANEL  08/16/2023   • ANNUAL WELLNESS VISIT  05/10/2024   • HEPATITIS C SCREENING  Completed   • COVID-19 Vaccine  Completed   • Pneumococcal Vaccine 65+  Completed   • COLONOSCOPY  Discontinued              Assessment & Plan    ECG 12 Lead    Date/Time: 5/10/2023 1:02 PM  Performed by: Omer Diaz MD  Authorized by: Omer Diaz MD   Comparison: compared with previous ECG from 8/18/2022  Comparison to previous ECG: NSR  Rhythm: sinus rhythm  Rate: normal  Conduction: conduction normal  ST Segments: ST segments normal  T Waves: T waves normal  QRS axis: normal  Other: no other findings    Clinical impression: normal ECG  Comments: NSR, no sign of ischemia, no change from previous EKG          CMS Preventative Services Quick Reference  Risk Factors Identified During Encounter  None Identified  The above risks/problems have been discussed with the patient.  Follow up actions/plans if indicated are seen below in the Assessment/Plan Section.  Pertinent information has been shared with the patient in the After Visit Summary.    Diagnoses and all orders for this visit:    1. Medicare annual wellness visit, subsequent (Primary)  -     Comprehensive Metabolic Panel; Future  -     Hemoglobin A1c; Future  -     Lipid Panel; Future  -     CBC & Differential; Future  -     Vitamin D,25-Hydroxy; Future  -     TSH Rfx On Abnormal To Free T4; Future    2. Essential hypertension, benign  Assessment & Plan:  Discussed with patient to monitor their blood pressure and if systolic blood pressure goes above 140 or diastolic is above 90 to return to clinic.  Take medicines as directed, call for any problems, patient not having or any worrisome symptoms.        Orders:  -     dilTIAZem (TIAZAC) 240 MG 24 hr capsule; Take 1 capsule by mouth Daily.  Dispense: 90 capsule; Refill: 1  -     Comprehensive Metabolic Panel;  Future  -     Hemoglobin A1c; Future  -     Lipid Panel; Future  -     CBC & Differential; Future  -     Vitamin D,25-Hydroxy; Future  -     TSH Rfx On Abnormal To Free T4; Future    3. Paroxysmal atrial fibrillation  Assessment & Plan:  Patient has a history of A-fib.  And she has been on Eliquis in the past.  She is only taking an aspirin now.  I am going to get a referral to see cardiology to see if Eliquis is an option for her.  EKG showed normal sinus rhythm at this time.    Orders:  -     Comprehensive Metabolic Panel; Future  -     Hemoglobin A1c; Future  -     Lipid Panel; Future  -     CBC & Differential; Future  -     Vitamin D,25-Hydroxy; Future  -     TSH Rfx On Abnormal To Free T4; Future  -     Ambulatory Referral to Cardiology    4. Hypertension, essential  Assessment & Plan:  Discussed with patient to monitor their blood pressure and if systolic blood pressure goes above 140 or diastolic is above 90 to return to clinic.  Take medicines as directed, call for any problems, patient not having or any worrisome symptoms.        Orders:  -     Comprehensive Metabolic Panel; Future  -     Hemoglobin A1c; Future  -     Lipid Panel; Future  -     CBC & Differential; Future  -     Vitamin D,25-Hydroxy; Future  -     TSH Rfx On Abnormal To Free T4; Future    5. Hyperlipidemia, mixed  Assessment & Plan:  Blood work    Orders:  -     Comprehensive Metabolic Panel; Future  -     Hemoglobin A1c; Future  -     Lipid Panel; Future  -     CBC & Differential; Future  -     Vitamin D,25-Hydroxy; Future  -     TSH Rfx On Abnormal To Free T4; Future    6. Vitamin D deficiency  Assessment & Plan:  Blood work    Orders:  -     Comprehensive Metabolic Panel; Future  -     Hemoglobin A1c; Future  -     Lipid Panel; Future  -     CBC & Differential; Future  -     Vitamin D,25-Hydroxy; Future  -     TSH Rfx On Abnormal To Free T4; Future    7. Hyperglycemia  Assessment & Plan:  Blood work    Orders:  -     Comprehensive  Metabolic Panel; Future  -     Hemoglobin A1c; Future  -     Lipid Panel; Future  -     CBC & Differential; Future  -     Vitamin D,25-Hydroxy; Future  -     TSH Rfx On Abnormal To Free T4; Future    8. Chronic kidney disease, stage 3a  Assessment & Plan:  Blood work.Patient is instructed to not take any NSAIDs.  Medicines as directed.  Stay well-hydrated.      Orders:  -     Comprehensive Metabolic Panel; Future  -     Hemoglobin A1c; Future  -     Lipid Panel; Future  -     CBC & Differential; Future  -     Vitamin D,25-Hydroxy; Future  -     TSH Rfx On Abnormal To Free T4; Future    Other orders  -     ECG 12 Lead      Follow Up:   Return in about 1 month (around 6/10/2023) for Annual physical.     An After Visit Summary and PPPS were made available to the patient.          I spent 15 minutes caring for Pilar on this date of service. This time includes time spent by me in the following activities:preparing for the visit, reviewing tests, obtaining and/or reviewing a separately obtained history, performing a medically appropriate examination and/or evaluation , counseling and educating the patient/family/caregiver, ordering medications, tests, or procedures, referring and communicating with other health care professionals , documenting information in the medical record, independently interpreting results and communicating that information with the patient/family/caregiver and care coordination

## 2023-05-11 LAB
25(OH)D3+25(OH)D2 SERPL-MCNC: 48.2 NG/ML (ref 30–100)
ALBUMIN SERPL-MCNC: 4.7 G/DL (ref 3.7–4.7)
ALBUMIN/GLOB SERPL: 1.9 {RATIO} (ref 1.2–2.2)
ALP SERPL-CCNC: 77 IU/L (ref 44–121)
ALT SERPL-CCNC: 11 IU/L (ref 0–32)
AST SERPL-CCNC: 21 IU/L (ref 0–40)
BASOPHILS # BLD AUTO: 0.1 X10E3/UL (ref 0–0.2)
BASOPHILS NFR BLD AUTO: 1 %
BILIRUB SERPL-MCNC: 0.3 MG/DL (ref 0–1.2)
BUN SERPL-MCNC: 15 MG/DL (ref 8–27)
BUN/CREAT SERPL: 14 (ref 12–28)
CALCIUM SERPL-MCNC: 9.8 MG/DL (ref 8.7–10.3)
CHLORIDE SERPL-SCNC: 101 MMOL/L (ref 96–106)
CHOLEST SERPL-MCNC: 238 MG/DL (ref 100–199)
CO2 SERPL-SCNC: 21 MMOL/L (ref 20–29)
CREAT SERPL-MCNC: 1.07 MG/DL (ref 0.57–1)
EGFRCR SERPLBLD CKD-EPI 2021: 53 ML/MIN/1.73
EOSINOPHIL # BLD AUTO: 0.2 X10E3/UL (ref 0–0.4)
EOSINOPHIL NFR BLD AUTO: 3 %
ERYTHROCYTE [DISTWIDTH] IN BLOOD BY AUTOMATED COUNT: 13.5 % (ref 11.7–15.4)
GLOBULIN SER CALC-MCNC: 2.5 G/DL (ref 1.5–4.5)
GLUCOSE SERPL-MCNC: 99 MG/DL (ref 70–99)
HBA1C MFR BLD: 5.6 % (ref 4.8–5.6)
HCT VFR BLD AUTO: 37.8 % (ref 34–46.6)
HDLC SERPL-MCNC: 73 MG/DL
HGB BLD-MCNC: 12.3 G/DL (ref 11.1–15.9)
IMM GRANULOCYTES # BLD AUTO: 0 X10E3/UL (ref 0–0.1)
IMM GRANULOCYTES NFR BLD AUTO: 0 %
LDLC SERPL CALC-MCNC: 133 MG/DL (ref 0–99)
LYMPHOCYTES # BLD AUTO: 2.5 X10E3/UL (ref 0.7–3.1)
LYMPHOCYTES NFR BLD AUTO: 35 %
MCH RBC QN AUTO: 29.5 PG (ref 26.6–33)
MCHC RBC AUTO-ENTMCNC: 32.5 G/DL (ref 31.5–35.7)
MCV RBC AUTO: 91 FL (ref 79–97)
MONOCYTES # BLD AUTO: 0.7 X10E3/UL (ref 0.1–0.9)
MONOCYTES NFR BLD AUTO: 10 %
NEUTROPHILS # BLD AUTO: 3.7 X10E3/UL (ref 1.4–7)
NEUTROPHILS NFR BLD AUTO: 51 %
PLATELET # BLD AUTO: 342 X10E3/UL (ref 150–450)
POTASSIUM SERPL-SCNC: 4.7 MMOL/L (ref 3.5–5.2)
PROT SERPL-MCNC: 7.2 G/DL (ref 6–8.5)
RBC # BLD AUTO: 4.17 X10E6/UL (ref 3.77–5.28)
SODIUM SERPL-SCNC: 140 MMOL/L (ref 134–144)
TRIGL SERPL-MCNC: 181 MG/DL (ref 0–149)
TSH SERPL DL<=0.005 MIU/L-ACNC: 0.76 UIU/ML (ref 0.45–4.5)
VLDLC SERPL CALC-MCNC: 32 MG/DL (ref 5–40)
WBC # BLD AUTO: 7.1 X10E3/UL (ref 3.4–10.8)

## 2023-06-02 RX ORDER — MECLIZINE HYDROCHLORIDE 25 MG/1
25 TABLET ORAL 3 TIMES DAILY PRN
Qty: 90 TABLET | Refills: 0 | OUTPATIENT
Start: 2023-06-02

## 2023-06-02 NOTE — TELEPHONE ENCOUNTER
.      Caller: Pilar Colon    Relationship: Self    Best call back number: [phone]    Requested Prescriptions:   Requested Prescriptions     Pending Prescriptions Disp Refills   • meclizine (ANTIVERT) 25 MG tablet 90 tablet 0     Sig: Take 1 tablet by mouth 3 (Three) Times a Day As Needed for Dizziness or Nausea.        Pharmacy where request should be sent: [facility][id number] - [place], [place] - [address]  AT [facility] [address] - [phone] [facility] [phone] FX     Last office visit with prescribing clinician: 5/10/2023   Last telemedicine visit with prescribing clinician: Visit date not found   Next office visit with prescribing clinician: 6/22/2023       Does the patient have less than a 3 day supply:  [] Yes  [x] No    Would you like a call back once the refill request has been completed: [] Yes [x] No    If the office needs to give you a call back, can they leave a voicemail: [] Yes [x] No    Isaiah Avendaño   06/02/23 11:43 EDT

## 2023-06-02 NOTE — TELEPHONE ENCOUNTER
Rx Refill Note    Requested Prescriptions     Pending Prescriptions Disp Refills   • meclizine (ANTIVERT) 25 MG tablet 90 tablet 0     Sig: Take 1 tablet by mouth 3 (Three) Times a Day As Needed for Dizziness or Nausea.        Last office visit with prescribing clinician: 5/10/2023      Next office visit with prescribing clinician: 6/22/2023   Last labs:   Last refill: 03/23/2023   Pharmacy (be sure to add in Epic). correct

## 2023-06-16 ENCOUNTER — OFFICE VISIT (OUTPATIENT)
Dept: CARDIOLOGY | Facility: CLINIC | Age: 78
End: 2023-06-16
Payer: MEDICARE

## 2023-06-16 VITALS
OXYGEN SATURATION: 98 % | WEIGHT: 122 LBS | HEART RATE: 61 BPM | RESPIRATION RATE: 18 BRPM | BODY MASS INDEX: 23.95 KG/M2 | SYSTOLIC BLOOD PRESSURE: 126 MMHG | DIASTOLIC BLOOD PRESSURE: 62 MMHG | TEMPERATURE: 97 F | HEIGHT: 60 IN

## 2023-06-16 DIAGNOSIS — I48.0 PAROXYSMAL ATRIAL FIBRILLATION: ICD-10-CM

## 2023-06-16 DIAGNOSIS — J43.9 PULMONARY EMPHYSEMA, UNSPECIFIED EMPHYSEMA TYPE: ICD-10-CM

## 2023-06-16 DIAGNOSIS — I25.118 CORONARY ARTERY DISEASE OF NATIVE ARTERY OF NATIVE HEART WITH STABLE ANGINA PECTORIS: Primary | ICD-10-CM

## 2023-06-16 DIAGNOSIS — I10 ESSENTIAL HYPERTENSION, BENIGN: ICD-10-CM

## 2023-06-16 DIAGNOSIS — E78.2 HYPERLIPIDEMIA, MIXED: ICD-10-CM

## 2023-06-16 RX ORDER — EZETIMIBE 10 MG/1
10 TABLET ORAL DAILY
Qty: 90 TABLET | Refills: 3 | Status: SHIPPED | OUTPATIENT
Start: 2023-06-16

## 2023-06-16 RX ORDER — ROSUVASTATIN CALCIUM 40 MG/1
40 TABLET, COATED ORAL DAILY
Qty: 90 TABLET | Refills: 3 | Status: SHIPPED | OUTPATIENT
Start: 2023-06-16

## 2023-06-16 NOTE — PROGRESS NOTES
[facility]  [facility]  [address] [place] [address]  Dept: [phone]  Dept Fax: [phone]    Pilar Colon  1945    Follow Up Office Visit Note    History of Present Illness:  Pilar Colon is a 77 y.o. female who presents to the clinic for Follow-up. CAD- She denies any chest pain, just SOB, has severe COPD, has stent to RCA in 2003 and also in 2009 cath with 30% LAD, 20% CX and also RCA 20%, stress nuclear normal,, she complaints of palpitations,  daily and has history of PAF but for some reason she is not taking the Eliquis was rx last year, will get a Holter, will hermelindat Eliquis,     The following portions of the patient's history were reviewed and updated as appropriate: allergies, current medications, past family history, past medical history, past social history, past surgical history, and problem list.    Medications:  apixaban tablet  Aspirin capsule  baclofen  budesonide  busPIRone  cyclobenzaprine  dilTIAZem  ezetimibe  gabapentin  guaiFENesin  HYDROcodone-acetaminophen  ipratropium-albuterol  losartan  meclizine  omeprazole  PARoxetine  predniSONE  rosuvastatin  Stahist AD tablet    Subjective  Allergies   Allergen Reactions    Codeine Rash    Tramadol Swelling and Nausea Only        Past Medical History:   Diagnosis Date    Anemia     Anxiety     Arthritis     Asthma     Bronchitis     Chronic bronchitis with pulmonary emphysema 01/23/2015    COPD mixed type     Coronary arteriosclerosis in native artery 01/20/2010    Depression     Diverticulitis     Dupuytren contracture     Emphysema (subcutaneous) (surgical) resulting from a procedure     High risk medication use     History of degenerative disc disease     spine    History of left heart catheterization     STENT PLACED    Hx of adenomatous colonic polyps     Hyperlipidemia     Hypertension 12/31/2014    Low back pain     Mixed hyperlipidemia 12/31/2014    Osteoporosis     Paroxysmal A-fib      Peripheral polyneuropathy     Personal history of nicotine dependence     Pneumonia     Recurrent major depressive disorder in partial remission     Stress incontinence     Vertigo     Vitamin D deficiency        Past Surgical History:   Procedure Laterality Date    ARTERY SURGERY  2000    STENT LAD    BREAST BIOPSY Right     [facility], Dr. Severo Kay    BREAST LUMPECTOMY      BREAST LUMPECTOMY Left     HAND SURGERY Left     Arthritis surgery, [place], [place]    SHOULDER SURGERY Bilateral     [facility]. Dr. Dixon    SHOULDER SURGERY Bilateral     TUBAL ABDOMINAL LIGATION         Family History   Problem Relation Age of Onset    Liver cancer Father     Diabetes Sister 53    Heart disease Sister 59        Undefined        Social History     Socioeconomic History    Marital status: Single   Tobacco Use    Smoking status: Former     Packs/day: 0.25     Years: 48.00     Pack years: 12.00     Types: Cigarettes     Start date:      Quit date:      Years since quittin.4    Smokeless tobacco: Never    Tobacco comments:     Pt started smoking in  quit for 15 years and started back    Vaping Use    Vaping Use: Never used   Substance and Sexual Activity    Alcohol use: Yes     Comment: 1 Pint per week, SATURDAY NIGHT LAST DRINK     Drug use: Never    Sexual activity: Defer       Review of Systems   Constitutional: Negative.    HENT: Negative.     Respiratory: Negative.     Cardiovascular: Negative.    Endocrine: Negative.    Genitourinary: Negative.    Musculoskeletal: Negative.    Skin: Negative.    Allergic/Immunologic: Negative.    Neurological: Negative.    Hematological: Negative.    Psychiatric/Behavioral: Negative.       Cardiovascular Procedures    ECHO/MUGA:  STRESS TESTS:   CARDIAC CATH:   DEVICES:   HOLTER:   CT/MRI:   VASCULAR:   CARDIOTHORACIC:     Objective  Vitals:    23 1141   BP: 126/62   BP Location: Right arm   Patient Position: Lying   Cuff Size: Adult   Pulse: 61   Resp: 18  "  Temp: 97 °F (36.1 °C)   TempSrc: Infrared   SpO2: 98%   Weight: 55.3 kg (122 lb)   Height: 152.4 cm (60\")   PainSc: 0-No pain     Body mass index is 23.83 kg/m².     Physical Exam  Vitals reviewed.   Constitutional:       Appearance: Healthy appearance. Not in distress.   Neck:      Vascular: No JVR. JVD normal.   Pulmonary:      Effort: Pulmonary effort is normal.      Breath sounds: Normal breath sounds. No wheezing. No rhonchi. No rales.   Chest:      Chest wall: Not tender to palpatation.   Cardiovascular:      PMI at left midclavicular line. Normal rate. Regular rhythm. Normal S1. Normal S2.       Murmurs: There is no murmur.      No gallop.  No click. No rub.   Pulses:     Intact distal pulses.   Edema:     Peripheral edema absent.   Abdominal:      General: Bowel sounds are normal.      Palpations: Abdomen is soft.      Tenderness: There is no abdominal tenderness.   Musculoskeletal: Normal range of motion.         General: No tenderness. Skin:     General: Skin is warm and dry.   Neurological:      General: No focal deficit present.      Mental Status: Alert and oriented to person, place and time.        Diagnostic Data  Procedures    Assessment and Plan  Diagnoses and all orders for this visit:    Coronary artery disease of native artery of native heart with stable angina pectoris- No chest pain, prior stent in 2001, and cath in 2009 mild disease, stress nuclear normal    Essential hypertension, benign-  110.60. On Cardizem 240 mg, Losartan 50 mg and lasix 20 mg    Hyperlipidemia, mixed- we need to change to Crestor again 40 mg plus Zetia, Ldl is 133     Paroxysmal atrial fibrillation- Was at the hospital 2021 and has developed PAF, for some reason has not been taking the Eliquis, will sent it again, will get a holter  -     Holter Monitor - 72 Hour Up To 15 Days; Future    Pulmonary emphysema, unspecified emphysema type- per PCP     Other orders  -     rosuvastatin (CRESTOR) 40 MG tablet; Take 1 tablet " by mouth Daily.  -     ezetimibe (ZETIA) 10 MG tablet; Take 1 tablet by mouth Daily.         Return in about 1 month (around 7/16/2023) for Recheck with Dr. Leija.    Rubin Leija MD  06/16/2023

## 2023-06-22 PROBLEM — C44.90 SKIN CANCER: Status: ACTIVE | Noted: 2023-06-22

## 2023-08-04 RX ORDER — MECLIZINE HYDROCHLORIDE 25 MG/1
25 TABLET ORAL 3 TIMES DAILY PRN
Qty: 90 TABLET | Refills: 0 | Status: SHIPPED | OUTPATIENT
Start: 2023-08-04

## 2023-09-25 NOTE — TELEPHONE ENCOUNTER
Caller: Pilar Colon FRANKO    Relationship: Self    Best call back number: [phone]     Requested Prescriptions:   Requested Prescriptions     Pending Prescriptions Disp Refills    meclizine (ANTIVERT) 25 MG tablet 90 tablet 0     Sig: Take 1 tablet by mouth 3 (Three) Times a Day As Needed for Dizziness or Nausea.        Pharmacy where request should be sent: [facility] #[id number] - [place], [place] - [address]  [address] [facility] [address] - [phone] [facility] [phone]      Last office visit with prescribing clinician: 6/22/2023   Last telemedicine visit with prescribing clinician: Visit date not found   Next office visit with prescribing clinician: 9/29/2023     Additional details provided by patient: OUT OF MEDICATION     Does the patient have less than a 3 day supply:  [x] Yes  [] No    Would you like a call back once the refill request has been completed: [] Yes [x] No    If the office needs to give you a call back, can they leave a voicemail: [] Yes [x] No    Isaiah Enrique Rep   09/25/23 09:52 EDT

## 2023-09-26 RX ORDER — MECLIZINE HYDROCHLORIDE 25 MG/1
25 TABLET ORAL 3 TIMES DAILY PRN
Qty: 90 TABLET | Refills: 0 | Status: SHIPPED | OUTPATIENT
Start: 2023-09-26

## 2023-09-26 NOTE — TELEPHONE ENCOUNTER
Rx Refill Note    Requested Prescriptions     Pending Prescriptions Disp Refills    meclizine (ANTIVERT) 25 MG tablet 90 tablet 0     Sig: Take 1 tablet by mouth 3 (Three) Times a Day As Needed for Dizziness or Nausea.        Last office visit with prescribing clinician: 6/22/2023      Next office visit with prescribing clinician: 9/29/2023   Last labs:   Last refill: 08/04/2023   Pharmacy (be sure to add in Epic). correct

## 2023-09-29 ENCOUNTER — OFFICE VISIT (OUTPATIENT)
Dept: FAMILY MEDICINE CLINIC | Facility: CLINIC | Age: 78
End: 2023-09-29
Payer: MEDICARE

## 2023-09-29 VITALS
SYSTOLIC BLOOD PRESSURE: 128 MMHG | WEIGHT: 118.2 LBS | BODY MASS INDEX: 23.2 KG/M2 | HEIGHT: 60 IN | OXYGEN SATURATION: 98 % | RESPIRATION RATE: 16 BRPM | HEART RATE: 74 BPM | DIASTOLIC BLOOD PRESSURE: 82 MMHG

## 2023-09-29 DIAGNOSIS — E55.9 VITAMIN D DEFICIENCY: ICD-10-CM

## 2023-09-29 DIAGNOSIS — I25.118 CORONARY ARTERY DISEASE OF NATIVE ARTERY OF NATIVE HEART WITH STABLE ANGINA PECTORIS: ICD-10-CM

## 2023-09-29 DIAGNOSIS — M47.817 LUMBOSACRAL SPONDYLOSIS WITHOUT MYELOPATHY: ICD-10-CM

## 2023-09-29 DIAGNOSIS — E78.2 HYPERLIPIDEMIA, MIXED: ICD-10-CM

## 2023-09-29 DIAGNOSIS — I48.0 PAROXYSMAL ATRIAL FIBRILLATION: ICD-10-CM

## 2023-09-29 DIAGNOSIS — F17.219 CIGARETTE NICOTINE DEPENDENCE WITH NICOTINE-INDUCED DISORDER: ICD-10-CM

## 2023-09-29 DIAGNOSIS — R73.9 HYPERGLYCEMIA: ICD-10-CM

## 2023-09-29 DIAGNOSIS — I10 ESSENTIAL HYPERTENSION, BENIGN: Primary | ICD-10-CM

## 2023-09-29 PROCEDURE — 3074F SYST BP LT 130 MM HG: CPT | Performed by: FAMILY MEDICINE

## 2023-09-29 PROCEDURE — 90662 IIV NO PRSV INCREASED AG IM: CPT | Performed by: FAMILY MEDICINE

## 2023-09-29 PROCEDURE — G0008 ADMIN INFLUENZA VIRUS VAC: HCPCS | Performed by: FAMILY MEDICINE

## 2023-09-29 PROCEDURE — 3079F DIAST BP 80-89 MM HG: CPT | Performed by: FAMILY MEDICINE

## 2023-09-29 PROCEDURE — 99214 OFFICE O/P EST MOD 30 MIN: CPT | Performed by: FAMILY MEDICINE

## 2023-09-29 NOTE — PROGRESS NOTES
Patient Name: Pilar Colon  : 1945   MRN: [id number]     Chief Complaint:  f/u on BP  Chief Complaint   Patient presents with    Follow-up       History of Present Illness: Pilar Colon is a 77 y.o. female who is here today for follow up.  HPI        Review of Systems:   Review of Systems   Constitutional: Negative.    HENT: Negative.     Eyes: Negative.    Respiratory: Negative.     Cardiovascular: Negative.    Gastrointestinal: Negative.    Musculoskeletal:  Positive for back pain.   Neurological: Negative.       Past Medical History:   Past Medical History:   Diagnosis Date    Anemia     Anxiety     Arthritis     Asthma     Bronchitis     Chronic bronchitis with pulmonary emphysema 2015    COPD mixed type     Coronary arteriosclerosis in native artery 2010    Depression     Diverticulitis     Dupuytren contracture     Emphysema (subcutaneous) (surgical) resulting from a procedure     High risk medication use     History of degenerative disc disease     spine    History of left heart catheterization     STENT PLACED    Hx of adenomatous colonic polyps     Hyperlipidemia     Hypertension 2014    Low back pain     Mixed hyperlipidemia 2014    Osteoporosis     Paroxysmal A-fib     Peripheral polyneuropathy     Personal history of nicotine dependence     Pneumonia     Recurrent major depressive disorder in partial remission     Stress incontinence     Vertigo     Vitamin D deficiency        Past Surgical History:   Past Surgical History:   Procedure Laterality Date    ARTERY SURGERY      STENT LAD    BREAST BIOPSY Right     [place], Dr. Severo Kay    BREAST LUMPECTOMY      BREAST LUMPECTOMY Left     HAND SURGERY Left     Arthritis surgery, [place], [place]    SHOULDER SURGERY Bilateral     [place]. Dr. Dixon    SHOULDER SURGERY Bilateral     TUBAL ABDOMINAL LIGATION         Family History:   Family History   Problem Relation Age of Onset    Liver cancer Father      Patient and his wife Ambar Brown calling in as follow up from his office visit yesterday for jane cath removal. She said he was not able to urinate by later that day so they went to [facility]. She said the provider there tried to put a cath in, but couldn't and when he pulled it out a blood clot about 2 inches or so came out, and then he was able to urinate. She said all night and so far this morning he is urinating just fine. His next appointment is scheduled for 9/4/18 with Domenic Morley but they just wanted to verify if they need to do anything sooner or just keep that appointment. Please call them to advise. Diabetes Sister 53    Heart disease Sister 59        Undefined       Social History:   Social History     Socioeconomic History    Marital status: Single   Tobacco Use    Smoking status: Every Day     Packs/day: 0.25     Years: 48.00     Pack years: 12.00     Types: Cigarettes     Start date: 1974    Smokeless tobacco: Never    Tobacco comments:     Pt started smoking in 1958 quit for 15 years and started back 1974   Vaping Use    Vaping Use: Never used   Substance and Sexual Activity    Alcohol use: Yes     Comment: 1 Pint per week, SATURDAY NIGHT LAST DRINK     Drug use: Never    Sexual activity: Defer       Medications:     Current Outpatient Medications:     apixaban (ELIQUIS) 5 MG tablet tablet, Take 1 tablet by mouth 2 (Two) Times a Day., Disp: 180 tablet, Rfl: 0    baclofen (LIORESAL) 10 MG tablet, Take 1 tablet by mouth 2 (Two) Times a Day., Disp: , Rfl:     budesonide (PULMICORT) 0.5 MG/2ML nebulizer solution, Use 1 nebule (2mL) by nebulization Daily., Disp: 60 mL, Rfl: 1    busPIRone (BUSPAR) 10 MG tablet, Take 1 tablet by mouth 2 (Two) Times a Day., Disp: 180 tablet, Rfl: 0    dilTIAZem (TIAZAC) 240 MG 24 hr capsule, Take 1 capsule by mouth Daily., Disp: 90 capsule, Rfl: 1    gabapentin (NEURONTIN) 400 MG capsule, Take 1 capsule by mouth 3 (Three) Times a Day., Disp: , Rfl:     HYDROcodone-acetaminophen (NORCO)  MG per tablet, TAKE 1 TABLET BY MOUTH EVERY 6 HOURS. DO NOT FILL ANY EARLIER THAN 30 DAYS, Disp: , Rfl:     ipratropium-albuterol (DUO-NEB) 0.5-2.5 mg/3 ml nebulizer, Take 3 mL by nebulization Every 4 (Four) Hours As Needed for Wheezing or Shortness of Air., Disp: 360 mL, Rfl: 0    losartan (COZAAR) 50 MG tablet, Take 1 tablet by mouth Daily., Disp: 90 tablet, Rfl: 0    meclizine (ANTIVERT) 25 MG tablet, Take 1 tablet by mouth 3 (Three) Times a Day As Needed for Dizziness or Nausea., Disp: 90 tablet, Rfl: 0    omeprazole (priLOSEC) 40 MG capsule, Take 1 capsule by mouth Daily., Disp: 90  "capsule, Rfl: 0    PARoxetine (PAXIL) 40 MG tablet, Take 1 tablet by mouth Every Morning., Disp: 90 tablet, Rfl: 2    pravastatin (Pravachol) 40 MG tablet, Take 1 tablet by mouth Daily., Disp: 90 tablet, Rfl: 0    Allergies:   Allergies   Allergen Reactions    Codeine Rash    Tramadol Swelling and Nausea Only         Physical Exam:  Vital Signs:   Vitals:    09/29/23 1020   BP: 128/82   BP Location: Left arm   Patient Position: Sitting   Cuff Size: Adult   Pulse: 74   Resp: 16   SpO2: 98%   Weight: 53.6 kg (118 lb 3.2 oz)   Height: 152.4 cm (60\")     Body mass index is 23.08 kg/m².     Physical Exam  Vitals and nursing note reviewed.   Constitutional:       Appearance: Normal appearance. She is normal weight.   HENT:      Head: Normocephalic and atraumatic.      Right Ear: Tympanic membrane, ear canal and external ear normal.      Left Ear: Tympanic membrane, ear canal and external ear normal.      Nose: Nose normal.      Mouth/Throat:      Mouth: Mucous membranes are dry.      Pharynx: Oropharynx is clear.   Eyes:      Extraocular Movements: Extraocular movements intact.      Conjunctiva/sclera: Conjunctivae normal.      Pupils: Pupils are equal, round, and reactive to light.   Cardiovascular:      Rate and Rhythm: Normal rate and regular rhythm.      Pulses: Normal pulses.      Heart sounds: Normal heart sounds.   Pulmonary:      Effort: Pulmonary effort is normal.      Breath sounds: Normal breath sounds.   Musculoskeletal:      Cervical back: Normal range of motion and neck supple.   Feet:      Comments:      Neurological:      Mental Status: She is alert.       Procedures      Assessment/Plan:   Diagnoses and all orders for this visit:    1. Essential hypertension, benign (Primary)  Assessment & Plan:  Discussed with patient to monitor their blood pressure and if systolic blood pressure goes above 140 or diastolic is above 90 to return to clinic.  Take medicines as directed, call for any problems, patient not " having or any worrisome symptoms.        Orders:  -     Comprehensive Metabolic Panel; Future  -     Hemoglobin A1c; Future  -     Lipid Panel; Future  -     CBC & Differential; Future  -     Vitamin D,25-Hydroxy; Future  -     TSH Rfx On Abnormal To Free T4; Future  -     Vitamin B12; Future    2. Coronary artery disease of native artery of native heart with stable angina pectoris  Assessment & Plan:  Risk factor modification.    Orders:  -     Comprehensive Metabolic Panel; Future  -     Hemoglobin A1c; Future  -     Lipid Panel; Future  -     CBC & Differential; Future  -     Vitamin D,25-Hydroxy; Future  -     TSH Rfx On Abnormal To Free T4; Future  -     Vitamin B12; Future    3. Hyperlipidemia, mixed  Assessment & Plan:  Blood work today.    Orders:  -     Comprehensive Metabolic Panel; Future  -     Hemoglobin A1c; Future  -     Lipid Panel; Future  -     CBC & Differential; Future  -     Vitamin D,25-Hydroxy; Future  -     TSH Rfx On Abnormal To Free T4; Future  -     Vitamin B12; Future    4. Hyperglycemia  Assessment & Plan:  Blood work    Orders:  -     Comprehensive Metabolic Panel; Future  -     Hemoglobin A1c; Future  -     Lipid Panel; Future  -     CBC & Differential; Future  -     Vitamin D,25-Hydroxy; Future  -     TSH Rfx On Abnormal To Free T4; Future  -     Vitamin B12; Future    5. Vitamin D deficiency  Assessment & Plan:  Blood work    Orders:  -     Comprehensive Metabolic Panel; Future  -     Hemoglobin A1c; Future  -     Lipid Panel; Future  -     CBC & Differential; Future  -     Vitamin D,25-Hydroxy; Future  -     TSH Rfx On Abnormal To Free T4; Future  -     Vitamin B12; Future    6. Paroxysmal atrial fibrillation  Assessment & Plan:  We are going to refill patient's Eliquis.  We will follow.    Orders:  -     Comprehensive Metabolic Panel; Future  -     Hemoglobin A1c; Future  -     Lipid Panel; Future  -     CBC & Differential; Future  -     Vitamin D,25-Hydroxy; Future  -     TSH Rfx  On Abnormal To Free T4; Future  -     Vitamin B12; Future    7. Lumbosacral spondylosis without myelopathy  Assessment & Plan:  Patient seen Dr. Owusu    Orders:  -     Comprehensive Metabolic Panel; Future  -     Hemoglobin A1c; Future  -     Lipid Panel; Future  -     CBC & Differential; Future  -     Vitamin D,25-Hydroxy; Future  -     TSH Rfx On Abnormal To Free T4; Future  -     Vitamin B12; Future    8. Cigarette nicotine dependence with nicotine-induced disorder  Assessment & Plan:  Patient continues to smoke.  We will follow.    Orders:  -     Comprehensive Metabolic Panel; Future  -     Hemoglobin A1c; Future  -     Lipid Panel; Future  -     CBC & Differential; Future  -     Vitamin D,25-Hydroxy; Future  -     TSH Rfx On Abnormal To Free T4; Future  -     Vitamin B12; Future    Other orders  -     Fluzone High-Dose 65+yrs ([id number])  -     apixaban (ELIQUIS) 5 MG tablet tablet; Take 1 tablet by mouth 2 (Two) Times a Day.  Dispense: 180 tablet; Refill: 0             Follow Up:   Return in about 6 months (around 3/29/2024) for Recheck, Bloodwork 1 week prior to next appointment.    Omer Diaz MD  [facility]

## 2023-09-30 LAB
25(OH)D3+25(OH)D2 SERPL-MCNC: 45.2 NG/ML (ref 30–100)
ALBUMIN SERPL-MCNC: 4.6 G/DL (ref 3.8–4.8)
ALBUMIN/GLOB SERPL: 1.9 {RATIO} (ref 1.2–2.2)
ALP SERPL-CCNC: 78 IU/L (ref 44–121)
ALT SERPL-CCNC: 9 IU/L (ref 0–32)
AST SERPL-CCNC: 20 IU/L (ref 0–40)
BASOPHILS # BLD AUTO: 0.1 X10E3/UL (ref 0–0.2)
BASOPHILS NFR BLD AUTO: 1 %
BILIRUB SERPL-MCNC: 0.2 MG/DL (ref 0–1.2)
BUN SERPL-MCNC: 13 MG/DL (ref 8–27)
BUN/CREAT SERPL: 13 (ref 12–28)
CALCIUM SERPL-MCNC: 9.7 MG/DL (ref 8.7–10.3)
CHLORIDE SERPL-SCNC: 99 MMOL/L (ref 96–106)
CHOLEST SERPL-MCNC: 216 MG/DL (ref 100–199)
CO2 SERPL-SCNC: 23 MMOL/L (ref 20–29)
CREAT SERPL-MCNC: 0.97 MG/DL (ref 0.57–1)
EGFRCR SERPLBLD CKD-EPI 2021: 60 ML/MIN/1.73
EOSINOPHIL # BLD AUTO: 0.2 X10E3/UL (ref 0–0.4)
EOSINOPHIL NFR BLD AUTO: 2 %
ERYTHROCYTE [DISTWIDTH] IN BLOOD BY AUTOMATED COUNT: 13.9 % (ref 11.7–15.4)
GLOBULIN SER CALC-MCNC: 2.4 G/DL (ref 1.5–4.5)
GLUCOSE SERPL-MCNC: 100 MG/DL (ref 70–99)
HBA1C MFR BLD: 5.9 % (ref 4.8–5.6)
HCT VFR BLD AUTO: 39.2 % (ref 34–46.6)
HDLC SERPL-MCNC: 69 MG/DL
HGB BLD-MCNC: 12.9 G/DL (ref 11.1–15.9)
IMM GRANULOCYTES # BLD AUTO: 0 X10E3/UL (ref 0–0.1)
IMM GRANULOCYTES NFR BLD AUTO: 0 %
LDLC SERPL CALC-MCNC: 106 MG/DL (ref 0–99)
LYMPHOCYTES # BLD AUTO: 2.2 X10E3/UL (ref 0.7–3.1)
LYMPHOCYTES NFR BLD AUTO: 34 %
MCH RBC QN AUTO: 29.7 PG (ref 26.6–33)
MCHC RBC AUTO-ENTMCNC: 32.9 G/DL (ref 31.5–35.7)
MCV RBC AUTO: 90 FL (ref 79–97)
MONOCYTES # BLD AUTO: 0.7 X10E3/UL (ref 0.1–0.9)
MONOCYTES NFR BLD AUTO: 11 %
NEUTROPHILS # BLD AUTO: 3.4 X10E3/UL (ref 1.4–7)
NEUTROPHILS NFR BLD AUTO: 52 %
PLATELET # BLD AUTO: 352 X10E3/UL (ref 150–450)
POTASSIUM SERPL-SCNC: 4.7 MMOL/L (ref 3.5–5.2)
PROT SERPL-MCNC: 7 G/DL (ref 6–8.5)
RBC # BLD AUTO: 4.34 X10E6/UL (ref 3.77–5.28)
SODIUM SERPL-SCNC: 139 MMOL/L (ref 134–144)
TRIGL SERPL-MCNC: 240 MG/DL (ref 0–149)
TSH SERPL DL<=0.005 MIU/L-ACNC: 1.26 UIU/ML (ref 0.45–4.5)
VIT B12 SERPL-MCNC: 857 PG/ML (ref 232–1245)
VLDLC SERPL CALC-MCNC: 41 MG/DL (ref 5–40)
WBC # BLD AUTO: 6.6 X10E3/UL (ref 3.4–10.8)

## 2023-10-10 NOTE — TELEPHONE ENCOUNTER
Caller: Pilar Colon    Relationship: Self    Best call back number: [phone]     Requested Prescriptions:   Requested Prescriptions     Pending Prescriptions Disp Refills    PARoxetine (PAXIL) 40 MG tablet 90 tablet 2     Sig: Take 1 tablet by mouth Every Morning.    ipratropium-albuterol (DUO-NEB) 0.5-2.5 mg/3 ml nebulizer 360 mL 0     Sig: Take 3 mL by nebulization Every 4 (Four) Hours As Needed for Wheezing or Shortness of Air.        Pharmacy where request should be sent: [facility] #[id number] [place], [place] - [address] AT [facility] [address] - [phone]  - [phone] FX     Last office visit with prescribing clinician: 9/29/2023   Last telemedicine visit with prescribing clinician: Visit date not found   Next office visit with prescribing clinician: 3/29/2024     Additional details provided by patient: 1 WEEK OF PAXIL LEFT ON HAND.     PATIENT IS ALSO REQUESTING THE HOSE FOR THE NEBULIZING MACHINE.    PATIENT CAN NOT  THE BLOOD THINNER. DUE TO THE CO PAY FOR THIS MEDICATION.    THE PATIENT HAVE BEEN TAKING ASPRIN INSTEAD OF THE PRESCRIBED MEDICATION     Does the patient have less than a 3 day supply:  [x] Yes  [] No    Would you like a call back once the refill request has been completed: [x] Yes [] No    If the office needs to give you a call back, can they leave a voicemail: [] Yes [] No    Isaiah Contreras Rep   10/10/23 11:27 EDT

## 2023-10-11 RX ORDER — IPRATROPIUM BROMIDE AND ALBUTEROL SULFATE 2.5; .5 MG/3ML; MG/3ML
3 SOLUTION RESPIRATORY (INHALATION) EVERY 4 HOURS PRN
Qty: 360 ML | Refills: 0 | Status: SHIPPED | OUTPATIENT
Start: 2023-10-11

## 2023-10-11 RX ORDER — PAROXETINE HYDROCHLORIDE 40 MG/1
40 TABLET, FILM COATED ORAL EVERY MORNING
Qty: 90 TABLET | Refills: 2 | Status: SHIPPED | OUTPATIENT
Start: 2023-10-11

## 2023-10-19 RX ORDER — IPRATROPIUM BROMIDE AND ALBUTEROL SULFATE 2.5; .5 MG/3ML; MG/3ML
SOLUTION RESPIRATORY (INHALATION)
OUTPATIENT
Start: 2023-10-19

## 2023-10-26 RX ORDER — PRAVASTATIN SODIUM 40 MG
40 TABLET ORAL DAILY
Qty: 30 TABLET | Refills: 0 | Status: SHIPPED | OUTPATIENT
Start: 2023-10-26

## 2023-10-26 RX ORDER — PRAVASTATIN SODIUM 40 MG
40 TABLET ORAL DAILY
Qty: 90 TABLET | OUTPATIENT
Start: 2023-10-26

## 2023-10-26 NOTE — TELEPHONE ENCOUNTER
Caller: Pilar Colon FRANKO    Relationship: Self    Best call back number: [phone]     Requested Prescriptions:   Requested Prescriptions     Pending Prescriptions Disp Refills    pravastatin (Pravachol) 40 MG tablet 90 tablet 0     Sig: Take 1 tablet by mouth Daily.      Pharmacy where request should be sent: [facility] #[id number] - [place], [place] - [facility] AT [facility] [address] - [phone]  - [phone]      Last office visit with prescribing clinician: 9/29/2023   Last telemedicine visit with prescribing clinician: Visit date not found   Next office visit with prescribing clinician: 3/29/2024     Isaiah Dalton Rep   10/26/23 11:48 EDT

## 2023-11-06 DIAGNOSIS — K21.9 GASTROESOPHAGEAL REFLUX DISEASE WITHOUT ESOPHAGITIS: ICD-10-CM

## 2023-11-06 RX ORDER — OMEPRAZOLE 40 MG/1
40 CAPSULE, DELAYED RELEASE ORAL DAILY
Qty: 90 CAPSULE | Refills: 0 | Status: SHIPPED | OUTPATIENT
Start: 2023-11-06

## 2023-11-06 RX ORDER — LOSARTAN POTASSIUM 50 MG/1
50 TABLET ORAL DAILY
Qty: 90 TABLET | Refills: 0 | Status: SHIPPED | OUTPATIENT
Start: 2023-11-06

## 2023-11-06 RX ORDER — BUSPIRONE HYDROCHLORIDE 10 MG/1
10 TABLET ORAL 2 TIMES DAILY
Qty: 180 TABLET | Refills: 0 | Status: SHIPPED | OUTPATIENT
Start: 2023-11-06

## 2023-11-06 NOTE — TELEPHONE ENCOUNTER
Caller: Pilar Colon    Relationship: Self    Best call back number: [phone]     Requested Prescriptions:   Requested Prescriptions     Pending Prescriptions Disp Refills    omeprazole (priLOSEC) 40 MG capsule 90 capsule 0     Sig: Take 1 capsule by mouth Daily.    losartan (COZAAR) 50 MG tablet 90 tablet 0     Sig: Take 1 tablet by mouth Daily.    busPIRone (BUSPAR) 10 MG tablet 180 tablet 0     Sig: Take 1 tablet by mouth 2 (Two) Times a Day.        [id number] - [place], [place] - [address] [facility] [address] - [phone]  - [phone] FX     Last office visit with prescribing clinician: 9/29/2023   Last telemedicine visit with prescribing clinician: Visit date not found   Next office visit with prescribing clinician: 3/29/2024     Additional details provided by patient: PATIENT HAS CALLED REQUESTING REFILLS ON [facility]    Does the patient have less than a 3 day supply:  [x] Yes  [] No    Would you like a call back once the refill request has been completed: [] Yes [x] No    If the office needs to give you a call back, can they leave a voicemail: [] Yes [x] No    Samira Pat   11/06/23 11:46 EST

## 2023-11-17 DIAGNOSIS — I10 ESSENTIAL HYPERTENSION, BENIGN: ICD-10-CM

## 2023-11-17 RX ORDER — DILTIAZEM HYDROCHLORIDE 240 MG/1
240 CAPSULE, EXTENDED RELEASE ORAL DAILY
Qty: 90 CAPSULE | Refills: 1 | Status: SHIPPED | OUTPATIENT
Start: 2023-11-17

## 2023-11-17 RX ORDER — MECLIZINE HYDROCHLORIDE 25 MG/1
25 TABLET ORAL 3 TIMES DAILY PRN
Qty: 90 TABLET | Refills: 0 | Status: SHIPPED | OUTPATIENT
Start: 2023-11-17

## 2023-11-17 NOTE — TELEPHONE ENCOUNTER
Caller: Pilar Colon    Relationship: Self    Best call back number: [phone]    Requested Prescriptions:   Requested Prescriptions     Pending Prescriptions Disp Refills    meclizine (ANTIVERT) 25 MG tablet 90 tablet 0     Sig: Take 1 tablet by mouth 3 (Three) Times a Day As Needed for Dizziness or Nausea.    dilTIAZem (TIAZAC) 240 MG 24 hr capsule 90 capsule 1     Sig: Take 1 capsule by mouth Daily.        [id number] [address]  AT [facility] [address] - [phone]  - [phone]      Last office visit with prescribing clinician: 9/29/2023   Last telemedicine visit with prescribing clinician: Visit date not found   Next office visit with prescribing clinician: 3/29/2024     Additional details provided by patient: WILL RUN OUT OVER THE WEEKEND    Does the patient have less than a 3 day supply:  [x] Yes  [] No    Would you like a call back once the refill request has been completed: [] Yes [x] No    If the office needs to give you a call back, can they leave a voicemail: [] Yes [x] No    Isaiah Peterson Rep   11/17/23 14:38 EST

## 2023-12-08 DIAGNOSIS — I48.0 PAROXYSMAL ATRIAL FIBRILLATION: ICD-10-CM

## 2023-12-08 DIAGNOSIS — I10 ESSENTIAL HYPERTENSION, BENIGN: ICD-10-CM

## 2023-12-08 DIAGNOSIS — O99.340 DEPRESSION DURING PREGNANCY, ANTEPARTUM: Primary | ICD-10-CM

## 2023-12-08 DIAGNOSIS — F32.A DEPRESSION DURING PREGNANCY, ANTEPARTUM: Primary | ICD-10-CM

## 2023-12-08 DIAGNOSIS — K21.9 GASTROESOPHAGEAL REFLUX DISEASE WITHOUT ESOPHAGITIS: ICD-10-CM

## 2023-12-08 DIAGNOSIS — R42 VERTIGO: ICD-10-CM

## 2023-12-11 RX ORDER — OMEPRAZOLE 40 MG/1
40 CAPSULE, DELAYED RELEASE ORAL DAILY
Qty: 90 CAPSULE | Refills: 1 | Status: SHIPPED | OUTPATIENT
Start: 2023-12-11

## 2023-12-11 RX ORDER — PAROXETINE HYDROCHLORIDE 40 MG/1
40 TABLET, FILM COATED ORAL EVERY MORNING
Qty: 90 TABLET | Refills: 1 | Status: SHIPPED | OUTPATIENT
Start: 2023-12-11

## 2023-12-11 RX ORDER — LOSARTAN POTASSIUM 50 MG/1
50 TABLET ORAL DAILY
Qty: 90 TABLET | Refills: 1 | Status: SHIPPED | OUTPATIENT
Start: 2023-12-11

## 2023-12-11 RX ORDER — DILTIAZEM HYDROCHLORIDE 240 MG/1
240 CAPSULE, EXTENDED RELEASE ORAL DAILY
Qty: 90 CAPSULE | Refills: 1 | Status: SHIPPED | OUTPATIENT
Start: 2023-12-11

## 2023-12-11 RX ORDER — BUSPIRONE HYDROCHLORIDE 10 MG/1
10 TABLET ORAL 2 TIMES DAILY
Qty: 180 TABLET | Refills: 1 | Status: SHIPPED | OUTPATIENT
Start: 2023-12-11

## 2023-12-11 RX ORDER — MECLIZINE HYDROCHLORIDE 25 MG/1
25 TABLET ORAL 3 TIMES DAILY PRN
Qty: 90 TABLET | Refills: 1 | Status: SHIPPED | OUTPATIENT
Start: 2023-12-11

## 2024-01-04 DIAGNOSIS — E78.2 HYPERLIPIDEMIA, MIXED: Primary | ICD-10-CM

## 2024-01-05 RX ORDER — PRAVASTATIN SODIUM 40 MG
40 TABLET ORAL DAILY
Qty: 90 TABLET | Refills: 0 | Status: SHIPPED | OUTPATIENT
Start: 2024-01-05

## 2024-04-14 DIAGNOSIS — E78.2 HYPERLIPIDEMIA, MIXED: ICD-10-CM

## 2024-04-15 DIAGNOSIS — E78.2 HYPERLIPIDEMIA, MIXED: ICD-10-CM

## 2024-04-15 RX ORDER — PRAVASTATIN SODIUM 40 MG
40 TABLET ORAL DAILY
Qty: 30 TABLET | Refills: 0 | Status: SHIPPED | OUTPATIENT
Start: 2024-04-15

## 2024-04-15 RX ORDER — PRAVASTATIN SODIUM 40 MG
40 TABLET ORAL DAILY
Qty: 90 TABLET | OUTPATIENT
Start: 2024-04-15

## 2024-04-18 DIAGNOSIS — K21.9 GASTROESOPHAGEAL REFLUX DISEASE WITHOUT ESOPHAGITIS: ICD-10-CM

## 2024-04-18 RX ORDER — LOSARTAN POTASSIUM 50 MG/1
50 TABLET ORAL DAILY
Qty: 90 TABLET | Refills: 1 | OUTPATIENT
Start: 2024-04-18

## 2024-05-14 DIAGNOSIS — O99.340 DEPRESSION DURING PREGNANCY, ANTEPARTUM: ICD-10-CM

## 2024-05-14 DIAGNOSIS — K21.9 GASTROESOPHAGEAL REFLUX DISEASE WITHOUT ESOPHAGITIS: ICD-10-CM

## 2024-05-14 DIAGNOSIS — F32.A DEPRESSION DURING PREGNANCY, ANTEPARTUM: ICD-10-CM

## 2024-05-14 DIAGNOSIS — E78.2 HYPERLIPIDEMIA, MIXED: ICD-10-CM

## 2024-05-14 RX ORDER — LOSARTAN POTASSIUM 50 MG/1
50 TABLET ORAL DAILY
Qty: 90 TABLET | Refills: 0 | Status: SHIPPED | OUTPATIENT
Start: 2024-05-14

## 2024-05-14 RX ORDER — PRAVASTATIN SODIUM 40 MG
40 TABLET ORAL DAILY
Qty: 30 TABLET | Refills: 0 | Status: SHIPPED | OUTPATIENT
Start: 2024-05-14

## 2024-05-14 RX ORDER — BUSPIRONE HYDROCHLORIDE 10 MG/1
10 TABLET ORAL 2 TIMES DAILY
Qty: 180 TABLET | Refills: 0 | Status: SHIPPED | OUTPATIENT
Start: 2024-05-14

## 2024-05-14 RX ORDER — OMEPRAZOLE 40 MG/1
40 CAPSULE, DELAYED RELEASE ORAL DAILY
Qty: 90 CAPSULE | Refills: 0 | Status: SHIPPED | OUTPATIENT
Start: 2024-05-14

## 2024-05-14 NOTE — TELEPHONE ENCOUNTER
Rx Refill Note    Requested Prescriptions     Pending Prescriptions Disp Refills    busPIRone (BUSPAR) 10 MG tablet [Pharmacy Med Name: BUSPIRONE 10MG TABLETS] 180 tablet 0     Sig: TAKE 1 TABLET BY MOUTH TWICE DAILY    losartan (COZAAR) 50 MG tablet [Pharmacy Med Name: LOSARTAN 50MG TABLETS] 90 tablet 0     Sig: TAKE 1 TABLET BY MOUTH DAILY    omeprazole (priLOSEC) 40 MG capsule [Pharmacy Med Name: OMEPRAZOLE 40MG CAPSULES] 90 capsule 0     Sig: TAKE 1 CAPSULE BY MOUTH DAILY    pravastatin (PRAVACHOL) 40 MG tablet [Pharmacy Med Name: PRAVASTATIN 40MG TABLETS] 30 tablet 0     Sig: TAKE 1 TABLET BY MOUTH DAILY        Last office visit with prescribing clinician: 9/29/2023      Next office visit with prescribing clinician: 5/15/2024   Last labs:   Last refill: needs   Pharmacy (be sure to add in Epic). correct

## 2024-05-15 ENCOUNTER — TELEPHONE (OUTPATIENT)
Dept: FAMILY MEDICINE CLINIC | Facility: CLINIC | Age: 79
End: 2024-05-15

## 2024-05-15 NOTE — TELEPHONE ENCOUNTER
Caller: Pilar Colon    Relationship to patient: Self    Best call back number: [phone]     Chief complaint: MEDICATIONS    Type of visit: SUBSEQUENT MEDICARE WELLNESS    Requested date:  NA    If rescheduling, when is the original appointment:  5/15/24    Additional notes: PATIENT HAS BEEN RESCHEDULED FOR HER AWV WITH [facility] ON 5/21/24        
04-Jan-2019 16:30

## 2024-06-04 ENCOUNTER — TELEPHONE (OUTPATIENT)
Dept: FAMILY MEDICINE CLINIC | Facility: CLINIC | Age: 79
End: 2024-06-04

## 2024-06-04 NOTE — TELEPHONE ENCOUNTER
Caller: Pilar Colon    Relationship: Self    Best call back number: [phone]     Requested Prescriptions:   Requested Prescriptions      No prescriptions requested or ordered in this encounter    FUROSEMIDE 20 MG     Pharmacy where request should be sent: [facility] #[id number] - [place], [place] - [address] AT [facility] [address] - [phone]  - [phone]      Last office visit with prescribing clinician: 9/29/2023   Last telemedicine visit with prescribing clinician: Visit date not found   Next office visit with prescribing clinician: Visit date not found     Does the patient have less than a 3 day supply:  [x] Yes  [] No    Isaiah Dalton   06/04/24 09:12 EDT

## 2024-06-05 ENCOUNTER — OFFICE VISIT (OUTPATIENT)
Dept: FAMILY MEDICINE CLINIC | Facility: CLINIC | Age: 79
End: 2024-06-05
Payer: MEDICARE

## 2024-06-05 VITALS
BODY MASS INDEX: 23.05 KG/M2 | HEIGHT: 60 IN | SYSTOLIC BLOOD PRESSURE: 150 MMHG | RESPIRATION RATE: 16 BRPM | OXYGEN SATURATION: 98 % | WEIGHT: 117.4 LBS | DIASTOLIC BLOOD PRESSURE: 80 MMHG | HEART RATE: 76 BPM

## 2024-06-05 DIAGNOSIS — J44.1 COPD WITH EXACERBATION: ICD-10-CM

## 2024-06-05 DIAGNOSIS — R53.83 OTHER FATIGUE: ICD-10-CM

## 2024-06-05 DIAGNOSIS — Z00.00 ANNUAL PHYSICAL EXAM: Primary | ICD-10-CM

## 2024-06-05 DIAGNOSIS — Z78.0 POSTMENOPAUSE: ICD-10-CM

## 2024-06-05 DIAGNOSIS — E78.2 HYPERLIPIDEMIA, MIXED: ICD-10-CM

## 2024-06-05 RX ORDER — ASPIRIN 81 MG/1
81 TABLET ORAL DAILY
COMMUNITY

## 2024-06-05 RX ORDER — FUROSEMIDE 20 MG/1
20 TABLET ORAL DAILY
COMMUNITY
End: 2024-06-10

## 2024-06-05 NOTE — PROGRESS NOTES
QUICK REFERENCE INFORMATION:  The ABCs of the Annual Wellness Visit    Subsequent Medicare Wellness Visit    HEALTH RISK ASSESSMENT    1945    Recent Hospitalizations:  No hospitalization(s) within the last year..        Current Medical Providers:  Patient Care Team:  Omer Diaz MD as PCP - General (Family Medicine)  Vick Lou MD as Consulting Physician (Anesthesiology)  Rubin Leija MD as Consulting Physician (Cardiology)  Vick Stallworth MD (Pain Medicine)        Smoking Status:  Social History     Tobacco Use   Smoking Status Every Day    Current packs/day: 0.25    Average packs/day: 0.3 packs/day for 50.4 years (12.6 ttl pk-yrs)    Types: Cigarettes    Start date:    Smokeless Tobacco Never   Tobacco Comments    Pt started smoking in  quit for 15 years and started back        Alcohol Consumption:  Social History     Substance and Sexual Activity   Alcohol Use Yes    Comment: 1 Pint per week, SATURDAY NIGHT LAST DRINK        Depression Screen:       2024    11:28 AM   PHQ-2/PHQ-9 Depression Screening   Little Interest or Pleasure in Doing Things 0-->not at all   Feeling Down, Depressed or Hopeless 0-->not at all   PHQ-9: Brief Depression Severity Measure Score 0       Health Habits and Functional and Cognitive Screenin/5/2024    11:29 AM   Functional & Cognitive Status   Do you have difficulty preparing food and eating? No   Do you have difficulty bathing yourself, getting dressed or grooming yourself? No   Do you have difficulty using the toilet? No   Do you have difficulty moving around from place to place? No   Do you have trouble with steps or getting out of a bed or a chair? Yes   Current Diet Well Balanced Diet   Dental Exam Up to date   Eye Exam Up to date   Exercise (times per week) 0 times per week   Current Exercises Include No Regular Exercise   Do you need help using the phone?  No   Are you deaf or do you have serious difficulty hearing?  No    Do you need help to go to places out of walking distance? No   Do you need help shopping? No   Do you need help preparing meals?  No   Do you need help with housework?  No   Do you need help with laundry? No   Do you need help taking your medications? No   Do you need help managing money? No   Do you ever drive or ride in a car without wearing a seat belt? No   Have you felt unusual stress, anger or loneliness in the last month? Yes   Who do you live with? Child   If you need help, do you have trouble finding someone available to you? Yes   Have you been bothered in the last four weeks by sexual problems? No   Do you have difficulty concentrating, remembering or making decisions? Yes       Does the patient have evidence of cognitive impairment? Yes    Aspirin use counseling:  Eliquis      Recent Lab Results:  CMP:  Lab Results   Component Value Date    BUN 13 09/29/2023    CREATININE 0.97 09/29/2023    EGFRIFNONA 65 10/10/2021    BCR 13 09/29/2023     09/29/2023    K 4.7 09/29/2023    CO2 23 09/29/2023    CALCIUM 9.7 09/29/2023    PROTENTOTREF 7.0 09/29/2023    ALBUMIN 4.6 09/29/2023    LABGLOBREF 2.4 09/29/2023    LABIL2 1.9 09/29/2023    BILITOT 0.2 09/29/2023    ALKPHOS 78 09/29/2023    AST 20 09/29/2023    ALT 9 09/29/2023     Lipid Panel:  Lab Results   Component Value Date    TRIG 240 (H) 09/29/2023    HDL 69 09/29/2023    VLDL 41 (H) 09/29/2023     HbA1c:  Lab Results   Component Value Date    HGBA1C 5.9 (H) 09/29/2023       Visual Acuity:  Vision Screening    Right eye Left eye Both eyes   Without correction 20/50 20/50 20/50   With correction          Age-appropriate Screening Schedule:  Refer to the list below for future screening recommendations based on patient's age, sex and/or medical conditions. Orders for these recommended tests are listed in the plan section. The patient has been provided with a written plan.    Health Maintenance   Topic Date Due    DXA SCAN  06/05/2022    ANNUAL WELLNESS  VISIT  05/10/2024    ZOSTER VACCINE (1 of 2) 06/05/2024 (Originally 10/3/1994)    COVID-19 Vaccine (4 - 2023-24 season) 06/07/2024 (Originally 9/1/2023)    TDAP/TD VACCINES (2 - Td or Tdap) 09/29/2024 (Originally 5/3/2021)    RSV Vaccine - Adults (1 - 1-dose 60+ series) 06/05/2025 (Originally 10/3/2004)    INFLUENZA VACCINE  08/01/2024    LIPID PANEL  09/29/2024    HEPATITIS C SCREENING  Completed    Pneumococcal Vaccine 65+  Completed    COLONOSCOPY  Discontinued        Subjective   History of Present Illness    Pilar Colon is a 78 y.o. female who presents for an Subsequent Wellness Visit.    The following portions of the patient's history were reviewed and updated as appropriate: allergies, current medications, past family history, past medical history, past social history, past surgical history, and problem list.    Outpatient Medications Prior to Visit   Medication Sig Dispense Refill    baclofen (LIORESAL) 10 MG tablet Take 1 tablet by mouth 2 (Two) Times a Day.      busPIRone (BUSPAR) 10 MG tablet TAKE 1 TABLET BY MOUTH TWICE DAILY 180 tablet 0    dilTIAZem (Tiadylt ER) 240 MG 24 hr capsule TAKE 1 CAPSULE EVERY DAY 90 capsule 1    furosemide (LASIX) 20 MG tablet Take 1 tablet by mouth Daily.      gabapentin (NEURONTIN) 400 MG capsule Take 1 capsule by mouth 3 (Three) Times a Day.      HYDROcodone-acetaminophen (NORCO)  MG per tablet TAKE 1 TABLET BY MOUTH EVERY 6 HOURS. DO NOT FILL ANY EARLIER THAN 30 DAYS      ipratropium-albuterol (DUO-NEB) 0.5-2.5 mg/3 ml nebulizer Take 3 mL by nebulization Every 4 (Four) Hours As Needed for Wheezing or Shortness of Air. 360 mL 0    losartan (COZAAR) 50 MG tablet TAKE 1 TABLET BY MOUTH DAILY 90 tablet 0    meclizine (ANTIVERT) 25 MG tablet TAKE 1 TABLET BY MOUTH 3 (THREE) TIMES A DAY AS NEEDED FOR DIZZINESS OR NAUSEA. 90 tablet 1    omeprazole (priLOSEC) 40 MG capsule TAKE 1 CAPSULE BY MOUTH DAILY 90 capsule 0    PARoxetine (PAXIL) 40 MG tablet TAKE 1 TABLET EVERY  MORNING 90 tablet 1    pravastatin (PRAVACHOL) 40 MG tablet TAKE 1 TABLET BY MOUTH DAILY 30 tablet 0    budesonide (PULMICORT) 0.5 MG/2ML nebulizer solution Use 1 nebule (2mL) by nebulization Daily. 60 mL 1    aspirin 81 MG EC tablet Take 1 tablet by mouth Daily.      apixaban (ELIQUIS) 5 MG tablet tablet Take 1 tablet by mouth 2 (Two) Times a Day. (Patient not taking: Reported on 6/5/2024) 180 tablet 0     No facility-administered medications prior to visit.       Patient Active Problem List   Diagnosis    Anxiety disorder    Depression    Hyperlipidemia, mixed    Essential hypertension, benign    Low back pain without sciatica    Bilateral hip pain    Pulmonary emphysema    Coronary artery disease involving native coronary artery of native heart    Paroxysmal atrial fibrillation    Chronic bronchitis with pulmonary emphysema    Dupuytren's contracture    H/O: drug dependency    High risk medication use    Polyneuropathy    Recurrent major depression in partial remission    Vertigo    Vitamin D deficiency    Hyperglycemia    Chronic kidney disease, stage 3a    Adjustment disorder with mixed anxiety and depressed mood    Dysuria    Medicare annual wellness visit, subsequent    Hereditary and idiopathic neuropathy, unspecified    Intervertebral disc disorders with radiculopathy, thoracolumbar region    Lumbosacral spondylosis without myelopathy    Nicotine dependence    Other long term (current) drug therapy    Pain in thoracic spine    Pathological fracture    Spasm    Thoracic radiculopathy    Thoracic spondylosis without myelopathy    Wedge compression fracture of unspecified thoracic vertebra, initial encounter for closed fracture    Skin cancer       Advance Care Planning:  ACP discussion was held with the patient during this visit. Pt is working on her Advanced Directives but is having difficulty getting family to accept responsibility for financial issues.    Identification of Risk Factors:  Risk factors  include: Advance Directive Discussion  Alcohol Misuse  Lung Cancer Risk  Osteoporosis Risk  Polypharmacy  Tobacco Use/Dependance (use dotphrase .tobaccocessation for documentation).    Review of Systems   Constitutional:  Positive for fatigue. Negative for appetite change and fever.   HENT:  Positive for congestion.    Eyes:  Positive for redness.        Cataracts and lesion over left eye   Respiratory:  Positive for cough and shortness of breath.    Gastrointestinal:  Negative for blood in stool, constipation and diarrhea.   Endocrine: Negative.    Genitourinary:  Positive for difficulty urinating.   Musculoskeletal:  Positive for arthralgias and back pain.   Neurological:  Positive for light-headedness (intermittently). Negative for dizziness and headaches.   Psychiatric/Behavioral:  Negative for suicidal ideas. The patient is nervous/anxious.        Compared to one year ago, the patient feels her physical health is the same.  Compared to one year ago, the patient feels her mental health is worse.    Objective     Physical Exam  Vitals and nursing note reviewed.   Constitutional:       General: She is not in acute distress.     Appearance: She is not toxic-appearing.   HENT:      Head: Normocephalic and atraumatic.      Right Ear: Tympanic membrane, ear canal and external ear normal.      Left Ear: Tympanic membrane, ear canal and external ear normal.      Nose: Nose normal.      Mouth/Throat:      Mouth: Mucous membranes are moist.      Pharynx: No oropharyngeal exudate or posterior oropharyngeal erythema.   Eyes:      Extraocular Movements: Extraocular movements intact.      Conjunctiva/sclera: Conjunctivae normal.      Pupils: Pupils are equal, round, and reactive to light.   Cardiovascular:      Rate and Rhythm: Normal rate and regular rhythm.      Pulses: Normal pulses.      Heart sounds: Normal heart sounds.   Pulmonary:      Effort: Pulmonary effort is normal. No respiratory distress.      Breath sounds:  "Normal breath sounds. Wheezes: Expiratory throughout.   Abdominal:      General: Bowel sounds are normal. There is no distension.      Palpations: Abdomen is soft.      Tenderness: There is no abdominal tenderness.   Musculoskeletal:         General: Normal range of motion.      Cervical back: Normal range of motion and neck supple. No tenderness.      Right lower leg: No edema.      Left lower leg: No edema.   Lymphadenopathy:      Cervical: No cervical adenopathy.   Skin:     General: Skin is warm and dry.      Capillary Refill: Capillary refill takes less than 2 seconds.   Neurological:      General: No focal deficit present.      Mental Status: She is alert and oriented to person, place, and time.      Motor: No weakness.      Gait: Gait normal.   Psychiatric:         Mood and Affect: Mood normal.         Behavior: Behavior normal.         Thought Content: Thought content does not include suicidal ideation.         Vitals:    06/05/24 1131   BP: 150/80   BP Location: Right arm   Patient Position: Sitting   Cuff Size: Adult   Pulse: 76   Resp: 16   SpO2: 98%   Weight: 53.3 kg (117 lb 6.4 oz)   Height: 152.4 cm (60\")   PainSc: 0-No pain       BMI is within normal parameters. No other follow-up for BMI required.    Assessment & Plan   Patient Self-Management and Personalized Health Advice  The patient has been provided with information about: diet, exercise, tobacco cessation, alcohol use, fall prevention, and designing advance directives and preventive services including:   Alcohol Misuse Screening and Counseling  (15 minutes counseling time, Code )  Annual Wellness Visit (AWV)  Bone Density Measurements  Lung Cancer Screening Counseling and Annual Screening for Lung Cancer with Low Dose Computed Tomography (LDCT); (use of smartset for low dose CT for lung cancer screening for documentation and orders).    Visit Diagnoses:    ICD-10-CM ICD-9-CM   1. Annual physical exam  Z00.00 V70.0   2. Hyperlipidemia, " mixed  E78.2 272.2   3. Other fatigue  R53.83 780.79   4. Postmenopause  Z78.0 V49.81   5. COPD with exacerbation  J44.1 491.21       Orders Placed This Encounter   Procedures    DEXA Bone Density Axial     Standing Status:   Future     Standing Expiration Date:   6/5/2025     Order Specific Question:   Is patient taking or have taken long term Glucocorticoid (steroids)?     Answer:   No     Order Specific Question:   Does the patient have rheumatoid arthritis?     Answer:   No     Order Specific Question:   Does the patient have secondary osteoporosis?     Answer:   Yes     Order Specific Question:   Reason for Exam:     Answer:   post menopausal     Order Specific Question:   Does this patient have a diabetic monitoring/medication delivering device on?     Answer:   No     Order Specific Question:   Release to patient     Answer:   Routine Release [[id number]]    CBC Auto Differential     Order Specific Question:   Release to patient     Answer:   Routine Release [[id number]]    Comprehensive Metabolic Panel     Order Specific Question:   Release to patient     Answer:   Routine Release [[id number]]    Hemoglobin A1c     Order Specific Question:   Release to patient     Answer:   Routine Release [[id number]]    Lipid Panel     Order Specific Question:   Release to patient     Answer:   Routine Release [[id number]]    TSH Rfx On Abnormal To Free T4     Order Specific Question:   Release to patient     Answer:   Routine Release [[id number]]       Outpatient Encounter Medications as of 6/5/2024   Medication Sig Dispense Refill    baclofen (LIORESAL) 10 MG tablet Take 1 tablet by mouth 2 (Two) Times a Day.      busPIRone (BUSPAR) 10 MG tablet TAKE 1 TABLET BY MOUTH TWICE DAILY 180 tablet 0    dilTIAZem (Tiadylt ER) 240 MG 24 hr capsule TAKE 1 CAPSULE EVERY DAY 90 capsule 1    furosemide (LASIX) 20 MG tablet Take 1 tablet by mouth Daily.      gabapentin (NEURONTIN) 400 MG capsule Take 1 capsule by mouth 3 (Three) Times  a Day.      HYDROcodone-acetaminophen (NORCO)  MG per tablet TAKE 1 TABLET BY MOUTH EVERY 6 HOURS. DO NOT FILL ANY EARLIER THAN 30 DAYS      ipratropium-albuterol (DUO-NEB) 0.5-2.5 mg/3 ml nebulizer Take 3 mL by nebulization Every 4 (Four) Hours As Needed for Wheezing or Shortness of Air. 360 mL 0    losartan (COZAAR) 50 MG tablet TAKE 1 TABLET BY MOUTH DAILY 90 tablet 0    meclizine (ANTIVERT) 25 MG tablet TAKE 1 TABLET BY MOUTH 3 (THREE) TIMES A DAY AS NEEDED FOR DIZZINESS OR NAUSEA. 90 tablet 1    omeprazole (priLOSEC) 40 MG capsule TAKE 1 CAPSULE BY MOUTH DAILY 90 capsule 0    PARoxetine (PAXIL) 40 MG tablet TAKE 1 TABLET EVERY MORNING 90 tablet 1    pravastatin (PRAVACHOL) 40 MG tablet TAKE 1 TABLET BY MOUTH DAILY 30 tablet 0    [DISCONTINUED] budesonide (PULMICORT) 0.5 MG/2ML nebulizer solution Use 1 nebule (2mL) by nebulization Daily. 60 mL 1    aspirin 81 MG EC tablet Take 1 tablet by mouth Daily.      Fluticasone-Umeclidin-Vilant (TRELEGY) 100-62.5-25 MCG/ACT inhaler Inhale 1 puff Daily. 60 each 1    [DISCONTINUED] apixaban (ELIQUIS) 5 MG tablet tablet Take 1 tablet by mouth 2 (Two) Times a Day. (Patient not taking: Reported on 6/5/2024) 180 tablet 0     No facility-administered encounter medications on file as of 6/5/2024.     Reviewed use of high risk medication in the elderly: yes  Reviewed for potential of harmful drug interactions in the elderly: yes    Follow Up:  No follow-ups on file.     An After Visit Summary and PPPS with all of these plans were given to the patient.

## 2024-06-06 LAB
ALBUMIN SERPL-MCNC: NORMAL G/DL
ALP SERPL-CCNC: NORMAL U/L
ALT SERPL-CCNC: NORMAL U/L
AST SERPL-CCNC: NORMAL U/L
BASOPHILS # BLD AUTO: 0 X10E3/UL (ref 0–0.2)
BASOPHILS NFR BLD AUTO: 1 %
BILIRUB SERPL-MCNC: NORMAL MG/DL
BUN SERPL-MCNC: NORMAL MG/DL
CALCIUM SERPL-MCNC: NORMAL MG/DL
CHLORIDE SERPL-SCNC: NORMAL MMOL/L
CHOLEST SERPL-MCNC: NORMAL MG/DL
CO2 SERPL-SCNC: NORMAL MMOL/L
CREAT SERPL-MCNC: NORMAL MG/DL
EOSINOPHIL # BLD AUTO: 0.1 X10E3/UL (ref 0–0.4)
EOSINOPHIL NFR BLD AUTO: 1 %
ERYTHROCYTE [DISTWIDTH] IN BLOOD BY AUTOMATED COUNT: 13.2 % (ref 11.7–15.4)
GLUCOSE SERPL-MCNC: NORMAL MG/DL
HBA1C MFR BLD: 5.8 % (ref 4.8–5.6)
HCT VFR BLD AUTO: 41.5 % (ref 34–46.6)
HDLC SERPL-MCNC: NORMAL MG/DL
HGB BLD-MCNC: 13.5 G/DL (ref 11.1–15.9)
IMM GRANULOCYTES # BLD AUTO: 0 X10E3/UL (ref 0–0.1)
IMM GRANULOCYTES NFR BLD AUTO: 0 %
LYMPHOCYTES # BLD AUTO: 2.5 X10E3/UL (ref 0.7–3.1)
LYMPHOCYTES NFR BLD AUTO: 36 %
MCH RBC QN AUTO: 29.4 PG (ref 26.6–33)
MCHC RBC AUTO-ENTMCNC: 32.5 G/DL (ref 31.5–35.7)
MCV RBC AUTO: 90 FL (ref 79–97)
MONOCYTES # BLD AUTO: 0.7 X10E3/UL (ref 0.1–0.9)
MONOCYTES NFR BLD AUTO: 10 %
NEUTROPHILS # BLD AUTO: 3.6 X10E3/UL (ref 1.4–7)
NEUTROPHILS NFR BLD AUTO: 52 %
PLATELET # BLD AUTO: 334 X10E3/UL (ref 150–450)
POTASSIUM SERPL-SCNC: NORMAL MMOL/L
PROT SERPL-MCNC: NORMAL G/DL
RBC # BLD AUTO: 4.59 X10E6/UL (ref 3.77–5.28)
SODIUM SERPL-SCNC: NORMAL MMOL/L
SPECIMEN STATUS: NORMAL
TRIGL SERPL-MCNC: NORMAL MG/DL
TSH SERPL DL<=0.005 MIU/L-ACNC: 1.62 UIU/ML (ref 0.45–4.5)
VLDLC SERPL CALC-MCNC: NORMAL MG/DL
WBC # BLD AUTO: 6.9 X10E3/UL (ref 3.4–10.8)

## 2024-06-07 LAB
ALBUMIN SERPL-MCNC: 4.6 G/DL (ref 3.8–4.8)
ALBUMIN/GLOB SERPL: 1.8 {RATIO} (ref 1.2–2.2)
ALP SERPL-CCNC: 79 IU/L (ref 44–121)
ALT SERPL-CCNC: 10 IU/L (ref 0–32)
AST SERPL-CCNC: 22 IU/L (ref 0–40)
BILIRUB SERPL-MCNC: 0.4 MG/DL (ref 0–1.2)
BUN SERPL-MCNC: 15 MG/DL (ref 8–27)
BUN/CREAT SERPL: 16 (ref 12–28)
CALCIUM SERPL-MCNC: 10.1 MG/DL (ref 8.7–10.3)
CHLORIDE SERPL-SCNC: 98 MMOL/L (ref 96–106)
CHOLEST SERPL-MCNC: 242 MG/DL (ref 100–199)
CO2 SERPL-SCNC: 19 MMOL/L (ref 20–29)
CREAT SERPL-MCNC: 0.95 MG/DL (ref 0.57–1)
EGFRCR SERPLBLD CKD-EPI 2021: 61 ML/MIN/1.73
GLOBULIN SER CALC-MCNC: 2.6 G/DL (ref 1.5–4.5)
GLUCOSE SERPL-MCNC: 99 MG/DL (ref 70–99)
HDLC SERPL-MCNC: 78 MG/DL
LDLC SERPL CALC-MCNC: 131 MG/DL (ref 0–99)
POTASSIUM SERPL-SCNC: 4.8 MMOL/L (ref 3.5–5.2)
PROT SERPL-MCNC: 7.2 G/DL (ref 6–8.5)
SODIUM SERPL-SCNC: 135 MMOL/L (ref 134–144)
TRIGL SERPL-MCNC: 191 MG/DL (ref 0–149)
VLDLC SERPL CALC-MCNC: 33 MG/DL (ref 5–40)

## 2024-06-10 RX ORDER — FUROSEMIDE 20 MG/1
20 TABLET ORAL DAILY PRN
Qty: 30 TABLET | Refills: 0 | Status: SHIPPED | OUTPATIENT
Start: 2024-06-10

## 2024-06-10 NOTE — TELEPHONE ENCOUNTER
Caller: Pilar Colon FRANKO    Relationship: Self    Best call back number: [phone]     Requested Prescriptions:   Requested Prescriptions     Pending Prescriptions Disp Refills    furosemide (LASIX) 20 MG tablet [Pharmacy Med Name: FUROSEMIDE 20MG TABLETS] 30 tablet 0     Sig: TAKE 1 TABLET BY MOUTH DAILY AS NEEDED FOR SWELLING        Pharmacy where request should be sent: [id number] - [place], [place] - [address]  AT [facility] [address] - [phone]  - [phone] FX     Last office visit with prescribing clinician: 9/29/2023   Last telemedicine visit with prescribing clinician: Visit date not found   Next office visit with prescribing clinician: Visit date not found     Additional details provided by patient: PATIENT IS OUT    Does the patient have less than a 3 day supply:  [x] Yes  [] No    Would you like a call back once the refill request has been completed: [x] Yes [] No    If the office needs to give you a call back, can they leave a voicemail: [] Yes [x] No    Isaiah Card   06/10/24 10:48 EDT

## 2024-06-25 DIAGNOSIS — E78.2 HYPERLIPIDEMIA, MIXED: ICD-10-CM

## 2024-06-25 RX ORDER — PRAVASTATIN SODIUM 40 MG
40 TABLET ORAL DAILY
Qty: 30 TABLET | Refills: 0 | Status: SHIPPED | OUTPATIENT
Start: 2024-06-25

## 2024-06-27 DIAGNOSIS — R42 VERTIGO: ICD-10-CM

## 2024-06-27 RX ORDER — MECLIZINE HYDROCHLORIDE 25 MG/1
25 TABLET ORAL 3 TIMES DAILY PRN
Qty: 90 TABLET | Refills: 1 | Status: SHIPPED | OUTPATIENT
Start: 2024-06-27

## 2024-06-27 NOTE — TELEPHONE ENCOUNTER
Rx Refill Note    Requested Prescriptions     Pending Prescriptions Disp Refills    meclizine (ANTIVERT) 25 MG tablet 90 tablet 1     Sig: Take 1 tablet by mouth 3 (Three) Times a Day As Needed for Dizziness or Nausea.        Last office visit with prescribing clinician: 9/29/2023      Next office visit with prescribing clinician: 7/16/2024   Last labs:   Last refill: 12/11/2023   Pharmacy (be sure to add in Epic). correct

## 2024-06-27 NOTE — TELEPHONE ENCOUNTER
Caller: Pilar Colon    Relationship: Self    Best call back number: [phone]    Requested Prescriptions:   Requested Prescriptions     Pending Prescriptions Disp Refills    meclizine (ANTIVERT) 25 MG tablet 90 tablet 1     Sig: Take 1 tablet by mouth 3 (Three) Times a Day As Needed for Dizziness or Nausea.        Pharmacy where request should be sent: [facility] #[id number] - [place], [place] - [address]  AT [facility] [address] - [phone] [facility] [phone] FX     Last office visit with prescribing clinician: 9/29/2023   Last telemedicine visit with prescribing clinician: Visit date not found   Next office visit with prescribing clinician: 7/16/2024         Does the patient have less than a 3 day supply:  [x] Yes  [] No    Would you like a call back once the refill request has been completed: [] Yes [x] No    If the office needs to give you a call back, can they leave a voicemail: [] Yes [x] No    Isaiah Avendaño   06/27/24 08:36 EDT

## 2024-07-16 ENCOUNTER — OFFICE VISIT (OUTPATIENT)
Dept: FAMILY MEDICINE CLINIC | Facility: CLINIC | Age: 79
End: 2024-07-16
Payer: MEDICARE

## 2024-07-16 VITALS
SYSTOLIC BLOOD PRESSURE: 153 MMHG | DIASTOLIC BLOOD PRESSURE: 94 MMHG | HEART RATE: 85 BPM | WEIGHT: 119.1 LBS | HEIGHT: 60 IN | BODY MASS INDEX: 23.38 KG/M2 | OXYGEN SATURATION: 96 % | RESPIRATION RATE: 16 BRPM

## 2024-07-16 DIAGNOSIS — E78.2 HYPERLIPIDEMIA, MIXED: ICD-10-CM

## 2024-07-16 DIAGNOSIS — R73.9 HYPERGLYCEMIA: ICD-10-CM

## 2024-07-16 DIAGNOSIS — Z87.891 PERSONAL HISTORY OF TOBACCO USE, PRESENTING HAZARDS TO HEALTH: ICD-10-CM

## 2024-07-16 DIAGNOSIS — N18.31 CHRONIC KIDNEY DISEASE, STAGE 3A: ICD-10-CM

## 2024-07-16 DIAGNOSIS — I10 ESSENTIAL HYPERTENSION, BENIGN: Primary | ICD-10-CM

## 2024-07-16 DIAGNOSIS — E55.9 VITAMIN D DEFICIENCY: ICD-10-CM

## 2024-07-16 PROCEDURE — 3077F SYST BP >= 140 MM HG: CPT | Performed by: FAMILY MEDICINE

## 2024-07-16 PROCEDURE — 99214 OFFICE O/P EST MOD 30 MIN: CPT | Performed by: FAMILY MEDICINE

## 2024-07-16 PROCEDURE — 3080F DIAST BP >= 90 MM HG: CPT | Performed by: FAMILY MEDICINE

## 2024-07-16 PROCEDURE — 1126F AMNT PAIN NOTED NONE PRSNT: CPT | Performed by: FAMILY MEDICINE

## 2024-07-16 PROCEDURE — G2211 COMPLEX E/M VISIT ADD ON: HCPCS | Performed by: FAMILY MEDICINE

## 2024-07-16 NOTE — PROGRESS NOTES
Patient Name: Pilar Colon  : 1945   MRN: [id number]     Chief Complaint:    Chief Complaint   Patient presents with    Follow-up     lab       History of Present Illness: Pilar Colon is a 78 y.o. female who is here today for follow up.  Follow-up          Review of Systems:   Review of Systems   Constitutional: Negative.    HENT: Negative.     Eyes: Negative.    Respiratory: Negative.     Cardiovascular: Negative.    Gastrointestinal: Negative.    Neurological: Negative.         Past Medical History:   Past Medical History:   Diagnosis Date    Anemia     Anxiety     Arthritis     Asthma     Bronchitis     Chronic bronchitis with pulmonary emphysema 2015    COPD mixed type     Coronary arteriosclerosis in native artery 2010    Depression     Diverticulitis     Dupuytren contracture     Emphysema (subcutaneous) (surgical) resulting from a procedure     High risk medication use     History of degenerative disc disease     spine    History of left heart catheterization     STENT PLACED    Hx of adenomatous colonic polyps     Hyperlipidemia     Hypertension 2014    Low back pain     Mixed hyperlipidemia 2014    Osteoporosis     Paroxysmal A-fib     Peripheral polyneuropathy     Personal history of nicotine dependence     Pneumonia     Recurrent major depressive disorder in partial remission     Stress incontinence     Vertigo     Vitamin D deficiency        Past Surgical History:   Past Surgical History:   Procedure Laterality Date    ARTERY SURGERY      STENT LAD    BREAST BIOPSY Right     [place], Dr. Severo Kay    BREAST LUMPECTOMY      BREAST LUMPECTOMY Left     HAND SURGERY Left     Arthritis surgery, [place], [place]    SHOULDER SURGERY Bilateral     [place]. Dr. Dixon    SHOULDER SURGERY Bilateral     TUBAL ABDOMINAL LIGATION         Family History:   Family History   Problem Relation Age of Onset    Liver cancer Father     Diabetes Sister 53    Heart disease  Sister 59        Undefined       Social History:   Social History     Socioeconomic History    Marital status: Single   Tobacco Use    Smoking status: Every Day     Current packs/day: 0.25     Average packs/day: 0.3 packs/day for 50.5 years (12.6 ttl pk-yrs)     Types: Cigarettes     Start date: 1974    Smokeless tobacco: Never    Tobacco comments:     Pt started smoking in 1958 quit for 15 years and started back 1974   Vaping Use    Vaping status: Never Used   Substance and Sexual Activity    Alcohol use: Yes     Comment: 1 Pint per week, SATURDAY NIGHT LAST DRINK     Drug use: Never    Sexual activity: Defer       Medications:     Current Outpatient Medications:     baclofen (LIORESAL) 10 MG tablet, Take 1 tablet by mouth 2 (Two) Times a Day., Disp: , Rfl:     busPIRone (BUSPAR) 10 MG tablet, TAKE 1 TABLET BY MOUTH TWICE DAILY, Disp: 180 tablet, Rfl: 0    dilTIAZem (Tiadylt ER) 240 MG 24 hr capsule, TAKE 1 CAPSULE EVERY DAY, Disp: 90 capsule, Rfl: 1    Fluticasone-Umeclidin-Vilant (TRELEGY) 100-62.5-25 MCG/ACT inhaler, Inhale 1 puff Daily., Disp: 60 each, Rfl: 1    furosemide (LASIX) 20 MG tablet, TAKE 1 TABLET BY MOUTH DAILY AS NEEDED FOR SWELLING, Disp: 30 tablet, Rfl: 0    gabapentin (NEURONTIN) 400 MG capsule, Take 1 capsule by mouth 3 (Three) Times a Day., Disp: , Rfl:     HYDROcodone-acetaminophen (NORCO)  MG per tablet, TAKE 1 TABLET BY MOUTH EVERY 6 HOURS. DO NOT FILL ANY EARLIER THAN 30 DAYS, Disp: , Rfl:     ipratropium-albuterol (DUO-NEB) 0.5-2.5 mg/3 ml nebulizer, Take 3 mL by nebulization Every 4 (Four) Hours As Needed for Wheezing or Shortness of Air., Disp: 360 mL, Rfl: 0    losartan (COZAAR) 50 MG tablet, TAKE 1 TABLET BY MOUTH DAILY, Disp: 90 tablet, Rfl: 0    meclizine (ANTIVERT) 25 MG tablet, Take 1 tablet by mouth 3 (Three) Times a Day As Needed for Dizziness or Nausea., Disp: 90 tablet, Rfl: 1    omeprazole (priLOSEC) 40 MG capsule, TAKE 1 CAPSULE BY MOUTH DAILY, Disp: 90 capsule, Rfl:  "0    PARoxetine (PAXIL) 40 MG tablet, TAKE 1 TABLET EVERY MORNING, Disp: 90 tablet, Rfl: 1    pravastatin (PRAVACHOL) 40 MG tablet, TAKE 1 TABLET BY MOUTH DAILY, Disp: 30 tablet, Rfl: 0    aspirin 81 MG EC tablet, Take 1 tablet by mouth Daily. (Patient not taking: Reported on 7/16/2024), Disp: , Rfl:     Allergies:   Allergies   Allergen Reactions    Codeine Rash    Tramadol Swelling and Nausea Only         Physical Exam:  Vital Signs:   Vitals:    07/16/24 1028   BP: 153/94   BP Location: Left arm   Patient Position: Sitting   Cuff Size: Adult   Pulse: 85   Resp: 16   SpO2: 96%   Weight: 54 kg (119 lb 1.6 oz)   Height: 152.4 cm (60\")   PainSc: 0-No pain     Body mass index is 23.26 kg/m².     Physical Exam  Vitals and nursing note reviewed.   Constitutional:       Appearance: Normal appearance. She is normal weight.   HENT:      Head: Normocephalic and atraumatic.      Right Ear: Tympanic membrane, ear canal and external ear normal.      Left Ear: Tympanic membrane, ear canal and external ear normal.      Nose: Nose normal.      Mouth/Throat:      Mouth: Mucous membranes are dry.      Pharynx: Oropharynx is clear.   Eyes:      Extraocular Movements: Extraocular movements intact.      Conjunctiva/sclera: Conjunctivae normal.      Pupils: Pupils are equal, round, and reactive to light.   Cardiovascular:      Rate and Rhythm: Normal rate and regular rhythm.      Pulses: Normal pulses.      Heart sounds: Normal heart sounds.   Pulmonary:      Effort: Pulmonary effort is normal.      Breath sounds: Normal breath sounds.   Musculoskeletal:      Cervical back: Normal range of motion and neck supple.   Feet:      Comments:      Neurological:      Mental Status: She is alert.         Procedures      Assessment/Plan:   Diagnoses and all orders for this visit:    1. Essential hypertension, benign (Primary)  Assessment & Plan:  Discussed with patient to monitor their blood pressure and if systolic blood pressure goes above 140 " or diastolic is above 90 to return to clinic.  Take medicines as directed, call for any problems, patient not having or any worrisome symptoms.        Orders:  -     Hemoglobin A1c; Future  -     Lipid Panel; Future  -     Comprehensive Metabolic Panel; Future  -     Vitamin B12; Future  -     Vitamin D,25-Hydroxy; Future  -     TSH Rfx On Abnormal To Free T4; Future  -     CBC & Differential; Future    2. Hyperlipidemia, mixed  Assessment & Plan:  Blood work in 3 months    Orders:  -     Hemoglobin A1c; Future  -     Lipid Panel; Future  -     Comprehensive Metabolic Panel; Future  -     Vitamin B12; Future  -     Vitamin D,25-Hydroxy; Future  -     TSH Rfx On Abnormal To Free T4; Future  -     CBC & Differential; Future    3. Hyperglycemia  Assessment & Plan:  Work in 3 months    Orders:  -     Hemoglobin A1c; Future  -     Lipid Panel; Future  -     Comprehensive Metabolic Panel; Future  -     Vitamin B12; Future  -     Vitamin D,25-Hydroxy; Future  -     TSH Rfx On Abnormal To Free T4; Future  -     CBC & Differential; Future    4. Vitamin D deficiency  Assessment & Plan:  Blood work in 3 months    Orders:  -     Hemoglobin A1c; Future  -     Lipid Panel; Future  -     Comprehensive Metabolic Panel; Future  -     Vitamin B12; Future  -     Vitamin D,25-Hydroxy; Future  -     TSH Rfx On Abnormal To Free T4; Future  -     CBC & Differential; Future    5. Chronic kidney disease, stage 3a  Assessment & Plan:  Patient is instructed to not take any NSAIDs.  Medicines as directed.  Stay well-hydrated.  We will get renal ultrasound    Orders:  -     US Renal Bilateral; Future  -     Hemoglobin A1c; Future  -     Lipid Panel; Future  -     Comprehensive Metabolic Panel; Future  -     Vitamin B12; Future  -     Vitamin D,25-Hydroxy; Future  -     TSH Rfx On Abnormal To Free T4; Future  -     CBC & Differential; Future    6. Personal history of tobacco use, presenting hazards to health  -     Hemoglobin A1c; Future  -      Lipid Panel; Future  -     Comprehensive Metabolic Panel; Future  -     Vitamin B12; Future  -     Vitamin D,25-Hydroxy; Future  -     TSH Rfx On Abnormal To Free T4; Future  -     CBC & Differential; Future             Follow Up:   No follow-ups on file.      Omer Diaz MD  [facility]

## 2024-07-16 NOTE — ASSESSMENT & PLAN NOTE
Patient is instructed to not take any NSAIDs.  Medicines as directed.  Stay well-hydrated.  We will get renal ultrasound

## 2024-07-30 DIAGNOSIS — E78.2 HYPERLIPIDEMIA, MIXED: ICD-10-CM

## 2024-07-30 RX ORDER — PRAVASTATIN SODIUM 40 MG
40 TABLET ORAL DAILY
Qty: 90 TABLET | Refills: 0 | Status: SHIPPED | OUTPATIENT
Start: 2024-07-30

## 2024-07-30 NOTE — TELEPHONE ENCOUNTER
Caller: [facility] - [place], [place] - [address] - [phone] [facility] [phone] FX    Relationship: Pharmacy    Best call back number: [phone]     Requested Prescriptions:   Requested Prescriptions     Pending Prescriptions Disp Refills    pravastatin (PRAVACHOL) 40 MG tablet 30 tablet 0     Sig: Take 1 tablet by mouth Daily.        Pharmacy where request should be sent: [facility] - [place], [place] - [address] - [phone] [facility] [phone] FX     Last office visit with prescribing clinician: 7/16/2024   Last telemedicine visit with prescribing clinician: Visit date not found   Next office visit with prescribing clinician: 10/16/2024     Additional details provided by patient: PATIENT'S MAIL-IN PHARMACY CALLED TO GET A REFILL OF THIS MEDICATION      Does the patient have less than a 3 day supply:  [] Yes  [x] No    Would you like a call back once the refill request has been completed: [] Yes [x] No    If the office needs to give you a call back, can they leave a voicemail: [] Yes [x] No    Isaiah Javier Rep   07/30/24 10:24 EDT

## 2024-08-27 DIAGNOSIS — K21.9 GASTROESOPHAGEAL REFLUX DISEASE WITHOUT ESOPHAGITIS: ICD-10-CM

## 2024-08-27 RX ORDER — LOSARTAN POTASSIUM 50 MG/1
50 TABLET ORAL DAILY
Qty: 90 TABLET | Refills: 1 | Status: SHIPPED | OUTPATIENT
Start: 2024-08-27

## 2024-08-27 NOTE — TELEPHONE ENCOUNTER
Rx Refill Note    Requested Prescriptions     Pending Prescriptions Disp Refills    losartan (COZAAR) 50 MG tablet [Pharmacy Med Name: LOSARTAN 50MG TABLETS] 90 tablet 0     Sig: TAKE 1 TABLET BY MOUTH DAILY        Last office visit with prescribing clinician: 7/16/2024      Next office visit with prescribing clinician: 10/16/2024   Last labs:   Last refill: 05/14/2024   Pharmacy (be sure to add in Epic). correct

## 2024-09-04 DIAGNOSIS — O99.340 DEPRESSION DURING PREGNANCY, ANTEPARTUM: ICD-10-CM

## 2024-09-04 DIAGNOSIS — J44.1 COPD WITH EXACERBATION: ICD-10-CM

## 2024-09-04 DIAGNOSIS — F32.A DEPRESSION DURING PREGNANCY, ANTEPARTUM: ICD-10-CM

## 2024-09-04 DIAGNOSIS — K21.9 GASTROESOPHAGEAL REFLUX DISEASE WITHOUT ESOPHAGITIS: ICD-10-CM

## 2024-09-04 RX ORDER — BUSPIRONE HYDROCHLORIDE 10 MG/1
10 TABLET ORAL 2 TIMES DAILY
Qty: 180 TABLET | Refills: 0 | Status: SHIPPED | OUTPATIENT
Start: 2024-09-04

## 2024-09-04 RX ORDER — FLUTICASONE FUROATE, UMECLIDINIUM BROMIDE AND VILANTEROL TRIFENATATE 100; 62.5; 25 UG/1; UG/1; UG/1
1 POWDER RESPIRATORY (INHALATION) DAILY
Qty: 60 EACH | Refills: 0 | Status: SHIPPED | OUTPATIENT
Start: 2024-09-04

## 2024-09-04 RX ORDER — FLUTICASONE FUROATE, UMECLIDINIUM BROMIDE AND VILANTEROL TRIFENATATE 100; 62.5; 25 UG/1; UG/1; UG/1
1 POWDER RESPIRATORY (INHALATION) DAILY
Qty: 60 EACH | Refills: 0 | OUTPATIENT
Start: 2024-09-04

## 2024-09-04 RX ORDER — OMEPRAZOLE 40 MG/1
40 CAPSULE, DELAYED RELEASE ORAL DAILY
Qty: 90 CAPSULE | Refills: 0 | Status: SHIPPED | OUTPATIENT
Start: 2024-09-04

## 2024-09-04 NOTE — TELEPHONE ENCOUNTER
Rx Refill Note    Requested Prescriptions     Pending Prescriptions Disp Refills    Trelegy Ellipta 100-62.5-25 MCG/ACT inhaler [Pharmacy Med Name: TRELEGY ELLIPTA 100-62.5MCG INH 30P] 60 each 0     Sig: INHALE 1 PUFF BY MOUTH DAILY        Last office visit with prescribing clinician: 6/5/2024      Next office visit with prescribing clinician: Visit date not found   Last labs:   Last refill: 06/05/2024   Pharmacy (be sure to add in Epic). correct

## 2024-09-04 NOTE — TELEPHONE ENCOUNTER
Rx Refill Note    Requested Prescriptions     Pending Prescriptions Disp Refills    omeprazole (priLOSEC) 40 MG capsule [Pharmacy Med Name: OMEPRAZOLE 40MG CAPSULES] 90 capsule 0     Sig: TAKE 1 CAPSULE BY MOUTH DAILY    busPIRone (BUSPAR) 10 MG tablet [Pharmacy Med Name: BUSPIRONE 10MG TABLETS] 180 tablet 0     Sig: TAKE 1 TABLET BY MOUTH TWICE DAILY        Last office visit with prescribing clinician: 7/16/2024      Next office visit with prescribing clinician: 10/16/2024   Last labs:   Last refill: 05/14/2024   Pharmacy (be sure to add in Epic). correct

## 2024-09-06 DIAGNOSIS — I10 ESSENTIAL HYPERTENSION, BENIGN: ICD-10-CM

## 2024-09-06 DIAGNOSIS — R42 VERTIGO: ICD-10-CM

## 2024-09-06 RX ORDER — MECLIZINE HYDROCHLORIDE 25 MG/1
25 TABLET ORAL 3 TIMES DAILY PRN
Qty: 90 TABLET | Refills: 1 | Status: SHIPPED | OUTPATIENT
Start: 2024-09-06

## 2024-09-06 RX ORDER — DILTIAZEM HYDROCHLORIDE 240 MG/1
240 CAPSULE, EXTENDED RELEASE ORAL DAILY
Qty: 90 CAPSULE | Refills: 1 | Status: SHIPPED | OUTPATIENT
Start: 2024-09-06

## 2024-09-06 NOTE — TELEPHONE ENCOUNTER
Caller: Pilar Colon    Relationship: Self    Best call back number: [phone]     Requested Prescriptions:   Requested Prescriptions     Pending Prescriptions Disp Refills    meclizine (ANTIVERT) 25 MG tablet 90 tablet 1     Sig: Take 1 tablet by mouth 3 (Three) Times a Day As Needed for Dizziness or Nausea.    dilTIAZem (Tiadylt ER) 240 MG 24 hr capsule 90 capsule 1     Sig: Take 1 capsule by mouth Daily.        [id number] [place], [place] - [address] [place], [place] [address]  AT [facility] [address] - [phone]  - [phone]      Last office visit with prescribing clinician: 7/16/2024   Last telemedicine visit with prescribing clinician: Visit date not found   Next office visit with prescribing clinician: 10/16/2024     Additional details provided by patient: PATIENT HAS 3 DAYS REMAINING. PLEASE ADVISE    Does the patient have less than a 3 day supply:  [x] Yes  [] No    Would you like a call back once the refill request has been completed: [x] Yes [] No    If the office needs to give you a call back, can they leave a voicemail: [x] Yes [] No    Isaiah Galeas Rep   09/06/24 09:59 EDT

## 2024-09-16 ENCOUNTER — TELEPHONE (OUTPATIENT)
Dept: FAMILY MEDICINE CLINIC | Facility: CLINIC | Age: 79
End: 2024-09-16
Payer: MEDICARE

## 2024-09-16 RX ORDER — ALENDRONATE SODIUM 70 MG/1
70 TABLET ORAL
Qty: 12 TABLET | Refills: 1 | Status: SHIPPED | OUTPATIENT
Start: 2024-09-16

## 2024-10-02 DIAGNOSIS — J44.1 COPD WITH EXACERBATION: ICD-10-CM

## 2024-10-02 RX ORDER — FLUTICASONE FUROATE, UMECLIDINIUM BROMIDE AND VILANTEROL TRIFENATATE 100; 62.5; 25 UG/1; UG/1; UG/1
1 POWDER RESPIRATORY (INHALATION) DAILY
Qty: 60 EACH | Refills: 0 | Status: SHIPPED | OUTPATIENT
Start: 2024-10-02

## 2024-11-04 DIAGNOSIS — J44.1 COPD WITH EXACERBATION: ICD-10-CM

## 2024-11-04 DIAGNOSIS — E78.2 HYPERLIPIDEMIA, MIXED: ICD-10-CM

## 2024-11-04 RX ORDER — PRAVASTATIN SODIUM 40 MG
40 TABLET ORAL DAILY
Qty: 30 TABLET | Refills: 0 | Status: SHIPPED | OUTPATIENT
Start: 2024-11-04

## 2024-11-04 RX ORDER — FLUTICASONE FUROATE, UMECLIDINIUM BROMIDE AND VILANTEROL TRIFENATATE 100; 62.5; 25 UG/1; UG/1; UG/1
1 POWDER RESPIRATORY (INHALATION) DAILY
Qty: 60 EACH | Refills: 0 | Status: SHIPPED | OUTPATIENT
Start: 2024-11-04

## 2024-11-04 NOTE — TELEPHONE ENCOUNTER
Rx Refill Note    Requested Prescriptions     Pending Prescriptions Disp Refills    Trelegy Ellipta 100-62.5-25 MCG/ACT inhaler [Pharmacy Med Name: TRELEGY ELLIPTA 100-62.5MCG INH 30P] 60 each 0     Sig: INHALE 1 PUFF BY MOUTH DAILY    pravastatin (PRAVACHOL) 40 MG tablet [Pharmacy Med Name: PRAVASTATIN 40MG TABLETS] 30 tablet 0     Sig: TAKE 1 TABLET BY MOUTH DAILY        Last office visit with prescribing clinician: 7/16/2024      Next office visit with prescribing clinician: Visit date not found   Last labs:   Last refill: needs   Pharmacy (be sure to add in Epic). correct

## 2024-11-16 DIAGNOSIS — F32.A DEPRESSION DURING PREGNANCY, ANTEPARTUM: ICD-10-CM

## 2024-11-16 DIAGNOSIS — O99.340 DEPRESSION DURING PREGNANCY, ANTEPARTUM: ICD-10-CM

## 2024-11-18 RX ORDER — PAROXETINE 40 MG/1
40 TABLET, FILM COATED ORAL EVERY MORNING
Qty: 90 TABLET | Refills: 0 | Status: SHIPPED | OUTPATIENT
Start: 2024-11-18

## 2024-11-22 DIAGNOSIS — O99.340 DEPRESSION DURING PREGNANCY, ANTEPARTUM: ICD-10-CM

## 2024-11-22 DIAGNOSIS — F32.A DEPRESSION DURING PREGNANCY, ANTEPARTUM: ICD-10-CM

## 2024-11-22 DIAGNOSIS — K21.9 GASTROESOPHAGEAL REFLUX DISEASE WITHOUT ESOPHAGITIS: ICD-10-CM

## 2024-11-22 RX ORDER — OMEPRAZOLE 40 MG/1
40 CAPSULE, DELAYED RELEASE ORAL DAILY
Qty: 90 CAPSULE | Refills: 0 | Status: SHIPPED | OUTPATIENT
Start: 2024-11-22

## 2024-11-22 RX ORDER — BUSPIRONE HYDROCHLORIDE 10 MG/1
10 TABLET ORAL 2 TIMES DAILY
Qty: 180 TABLET | Refills: 0 | Status: SHIPPED | OUTPATIENT
Start: 2024-11-22

## 2024-12-05 DIAGNOSIS — J44.1 COPD WITH EXACERBATION: ICD-10-CM

## 2024-12-05 RX ORDER — FLUTICASONE FUROATE, UMECLIDINIUM BROMIDE AND VILANTEROL TRIFENATATE 100; 62.5; 25 UG/1; UG/1; UG/1
1 POWDER RESPIRATORY (INHALATION) DAILY
Qty: 60 EACH | Refills: 0 | Status: SHIPPED | OUTPATIENT
Start: 2024-12-05

## 2024-12-05 NOTE — TELEPHONE ENCOUNTER
Rx Refill Note    Requested Prescriptions     Pending Prescriptions Disp Refills    Trelegy Ellipta 100-62.5-25 MCG/ACT inhaler [Pharmacy Med Name: TRELEGY ELLIPTA 100-62.5MCG INH 30P] 60 each 0     Sig: INHALE 1 PUFF BY MOUTH DAILY        Last office visit with prescribing clinician: 7/16/2024      Next office visit with prescribing clinician: Visit date not found   Last labs:   Last refill: 11/04/2024   Pharmacy (be sure to add in Epic). correct

## 2024-12-09 NOTE — TELEPHONE ENCOUNTER
Rx Refill Note    Requested Prescriptions     Pending Prescriptions Disp Refills    furosemide (LASIX) 20 MG tablet [Pharmacy Med Name: FUROSEMIDE 20MG TABLETS] 30 tablet 0     Sig: TAKE 1 TABLET BY MOUTH DAILY AS NEEDED FOR SWELLING        Last office visit with prescribing clinician: 7/16/2024      Next office visit with prescribing clinician: Visit date not found   Last labs:   Last refill: 06/10/2024   Pharmacy (be sure to add in Epic). correct

## 2024-12-10 RX ORDER — FUROSEMIDE 20 MG/1
20 TABLET ORAL DAILY PRN
Qty: 30 TABLET | Refills: 0 | Status: SHIPPED | OUTPATIENT
Start: 2024-12-10

## 2024-12-11 DIAGNOSIS — E78.2 HYPERLIPIDEMIA, MIXED: ICD-10-CM

## 2024-12-11 RX ORDER — PRAVASTATIN SODIUM 40 MG
40 TABLET ORAL DAILY
Qty: 30 TABLET | Refills: 0 | Status: SHIPPED | OUTPATIENT
Start: 2024-12-11

## 2024-12-28 DIAGNOSIS — R42 VERTIGO: ICD-10-CM

## 2024-12-28 RX ORDER — MECLIZINE HYDROCHLORIDE 25 MG/1
TABLET ORAL
Qty: 90 TABLET | Refills: 1 | OUTPATIENT
Start: 2024-12-28

## 2024-12-31 ENCOUNTER — OFFICE VISIT (OUTPATIENT)
Dept: FAMILY MEDICINE CLINIC | Facility: CLINIC | Age: 79
End: 2024-12-31
Payer: MEDICARE

## 2024-12-31 VITALS
BODY MASS INDEX: 24.7 KG/M2 | HEART RATE: 77 BPM | OXYGEN SATURATION: 98 % | SYSTOLIC BLOOD PRESSURE: 118 MMHG | DIASTOLIC BLOOD PRESSURE: 74 MMHG | WEIGHT: 125.8 LBS | HEIGHT: 60 IN | RESPIRATION RATE: 16 BRPM

## 2024-12-31 DIAGNOSIS — I25.118 CORONARY ARTERY DISEASE OF NATIVE ARTERY OF NATIVE HEART WITH STABLE ANGINA PECTORIS: ICD-10-CM

## 2024-12-31 DIAGNOSIS — J43.9 PULMONARY EMPHYSEMA, UNSPECIFIED EMPHYSEMA TYPE: ICD-10-CM

## 2024-12-31 DIAGNOSIS — I10 ESSENTIAL HYPERTENSION, BENIGN: ICD-10-CM

## 2024-12-31 DIAGNOSIS — Z87.891 PERSONAL HISTORY OF TOBACCO USE, PRESENTING HAZARDS TO HEALTH: ICD-10-CM

## 2024-12-31 DIAGNOSIS — E55.9 VITAMIN D DEFICIENCY: ICD-10-CM

## 2024-12-31 DIAGNOSIS — E78.2 HYPERLIPIDEMIA, MIXED: ICD-10-CM

## 2024-12-31 DIAGNOSIS — R53.83 OTHER FATIGUE: Primary | ICD-10-CM

## 2024-12-31 DIAGNOSIS — N18.31 CHRONIC KIDNEY DISEASE, STAGE 3A: ICD-10-CM

## 2024-12-31 DIAGNOSIS — Z23 ENCOUNTER FOR IMMUNIZATION: ICD-10-CM

## 2024-12-31 DIAGNOSIS — R73.9 HYPERGLYCEMIA: ICD-10-CM

## 2024-12-31 PROCEDURE — 90662 IIV NO PRSV INCREASED AG IM: CPT | Performed by: FAMILY MEDICINE

## 2024-12-31 PROCEDURE — G0008 ADMIN INFLUENZA VIRUS VAC: HCPCS | Performed by: FAMILY MEDICINE

## 2024-12-31 PROCEDURE — 1126F AMNT PAIN NOTED NONE PRSNT: CPT | Performed by: FAMILY MEDICINE

## 2024-12-31 PROCEDURE — 3074F SYST BP LT 130 MM HG: CPT | Performed by: FAMILY MEDICINE

## 2024-12-31 PROCEDURE — 99214 OFFICE O/P EST MOD 30 MIN: CPT | Performed by: FAMILY MEDICINE

## 2024-12-31 PROCEDURE — 3078F DIAST BP <80 MM HG: CPT | Performed by: FAMILY MEDICINE

## 2024-12-31 NOTE — PROGRESS NOTES
Patient Name: Pilar Colon  : 1945   MRN: [id number]     Chief Complaint:  No chief complaint on file.      History of Present Illness: Pilar Colon is a 79 y.o. female who is here today for follow up on dizziness  HPI        Review of Systems:   Review of Systems   Constitutional:  Positive for diaphoresis and fatigue.   HENT: Negative.     Eyes: Negative.    Respiratory:  Positive for cough.    Cardiovascular: Negative.    Gastrointestinal: Negative.    Neurological: Negative.         Past Medical History:   Past Medical History:   Diagnosis Date    Anemia     Anxiety     Arthritis     Asthma     Bronchitis     Chronic bronchitis with pulmonary emphysema 2015    COPD mixed type     Coronary arteriosclerosis in native artery 2010    Depression     Diverticulitis     Dupuytren contracture     Emphysema (subcutaneous) (surgical) resulting from a procedure     High risk medication use     History of degenerative disc disease     spine    History of left heart catheterization     STENT PLACED    Hx of adenomatous colonic polyps     Hyperlipidemia     Hypertension 2014    Low back pain     Mixed hyperlipidemia 2014    Osteoporosis     Paroxysmal A-fib     Peripheral polyneuropathy     Personal history of nicotine dependence     Pneumonia     Recurrent major depressive disorder in partial remission     Stress incontinence     Vertigo     Vitamin D deficiency        Past Surgical History:   Past Surgical History:   Procedure Laterality Date    ARTERY SURGERY      STENT LAD    BREAST BIOPSY Right     [place], Dr. Severo Kay    BREAST LUMPECTOMY      BREAST LUMPECTOMY Left     HAND SURGERY Left     Arthritis surgery, [place], [place]    SHOULDER SURGERY Bilateral     [place]. Dr. Dixon    SHOULDER SURGERY Bilateral     TUBAL ABDOMINAL LIGATION         Family History:   Family History   Problem Relation Age of Onset    Liver cancer Father     Diabetes Sister 53    Heart  disease Sister 59        Undefined       Social History:   Social History     Socioeconomic History    Marital status: Single   Tobacco Use    Smoking status: Every Day     Current packs/day: 0.25     Average packs/day: 0.3 packs/day for 51.0 years (12.7 ttl pk-yrs)     Types: Cigarettes     Start date: 1974    Smokeless tobacco: Never    Tobacco comments:     Pt started smoking in 1958 quit for 15 years and started back 1974   Vaping Use    Vaping status: Never Used   Substance and Sexual Activity    Alcohol use: Yes     Comment: 1 Pint per week, SATURDAY NIGHT LAST DRINK     Drug use: Never    Sexual activity: Defer       Medications:     Current Outpatient Medications:     alendronate (Fosamax) 70 MG tablet, Take 1 tablet by mouth Every 7 (Seven) Days., Disp: 12 tablet, Rfl: 1    baclofen (LIORESAL) 10 MG tablet, Take 1 tablet by mouth 2 (Two) Times a Day., Disp: , Rfl:     busPIRone (BUSPAR) 10 MG tablet, TAKE 1 TABLET BY MOUTH TWICE DAILY, Disp: 180 tablet, Rfl: 0    dilTIAZem (Tiadylt ER) 240 MG 24 hr capsule, Take 1 capsule by mouth Daily., Disp: 90 capsule, Rfl: 1    furosemide (LASIX) 20 MG tablet, TAKE 1 TABLET BY MOUTH DAILY AS NEEDED FOR SWELLING, Disp: 30 tablet, Rfl: 0    gabapentin (NEURONTIN) 400 MG capsule, Take 1 capsule by mouth 3 (Three) Times a Day., Disp: , Rfl:     HYDROcodone-acetaminophen (NORCO)  MG per tablet, TAKE 1 TABLET BY MOUTH EVERY 6 HOURS. DO NOT FILL ANY EARLIER THAN 30 DAYS, Disp: , Rfl:     ipratropium-albuterol (DUO-NEB) 0.5-2.5 mg/3 ml nebulizer, Take 3 mL by nebulization Every 4 (Four) Hours As Needed for Wheezing or Shortness of Air., Disp: 360 mL, Rfl: 0    losartan (COZAAR) 50 MG tablet, TAKE 1 TABLET BY MOUTH DAILY, Disp: 90 tablet, Rfl: 1    meclizine (ANTIVERT) 25 MG tablet, Take 1 tablet by mouth 3 (Three) Times a Day As Needed for Dizziness or Nausea., Disp: 90 tablet, Rfl: 1    omeprazole (priLOSEC) 40 MG capsule, TAKE 1 CAPSULE BY MOUTH DAILY, Disp: 90  capsule, Rfl: 0    PARoxetine (PAXIL) 40 MG tablet, TAKE 1 TABLET BY MOUTH EVERY MORNING, Disp: 90 tablet, Rfl: 0    pravastatin (PRAVACHOL) 40 MG tablet, TAKE 1 TABLET BY MOUTH DAILY, Disp: 30 tablet, Rfl: 0    Trelegy Ellipta 100-62.5-25 MCG/ACT inhaler, INHALE 1 PUFF BY MOUTH DAILY, Disp: 60 each, Rfl: 0    Allergies:   Allergies   Allergen Reactions    Codeine Rash    Tramadol Swelling and Nausea Only         Physical Exam:  Vital Signs: There were no vitals filed for this visit.  There is no height or weight on file to calculate BMI.     Physical Exam  Vitals and nursing note reviewed.   Constitutional:       Appearance: Normal appearance. She is normal weight.   HENT:      Head: Normocephalic and atraumatic.      Right Ear: Tympanic membrane, ear canal and external ear normal.      Left Ear: Tympanic membrane, ear canal and external ear normal.      Nose: Nose normal.      Mouth/Throat:      Mouth: Mucous membranes are dry.      Pharynx: Oropharynx is clear.   Eyes:      Extraocular Movements: Extraocular movements intact.      Conjunctiva/sclera: Conjunctivae normal.      Pupils: Pupils are equal, round, and reactive to light.   Cardiovascular:      Rate and Rhythm: Normal rate and regular rhythm.      Pulses: Normal pulses.      Heart sounds: Normal heart sounds.   Pulmonary:      Effort: Pulmonary effort is normal.      Breath sounds: Normal breath sounds.   Musculoskeletal:      Cervical back: Normal range of motion and neck supple.   Feet:      Comments:      Neurological:      Mental Status: She is alert.         Procedures      Assessment/Plan:   Diagnoses and all orders for this visit:    1. Other fatigue (Primary)  Assessment & Plan:  Over the last 3 months patient has been excessively tired and having sweating episodes both night and day.  She is getting progressively weaker.  She has been coughing in is not short of breath but is not energetic.  We are going to get Blood work today, CT scan of her  chest, return to clinic in 3 weeks.  If these tests are good and she improves we will just monitor if anything shows up we will pursue.      2. Coronary artery disease of native artery of native heart with stable angina pectoris  Assessment & Plan:  Risk factor modification.    Orders:  -     Fluzone High-Dose 65+yrs  -     Comprehensive Metabolic Panel; Future  -     Hemoglobin A1c; Future  -     Lipid Panel; Future  -     CBC & Differential; Future  -     Vitamin B12; Future  -     Vitamin D,25-Hydroxy; Future  -     TSH Rfx On Abnormal To Free T4; Future    3. Essential hypertension, benign  Assessment & Plan:  Discussed with patient to monitor their blood pressure and if systolic blood pressure goes above 140 or diastolic is above 90 to return to clinic.  Take medicines as directed, call for any problems, patient not having or any worrisome symptoms.        Orders:  -     Comprehensive Metabolic Panel; Future  -     Hemoglobin A1c; Future  -     Lipid Panel; Future  -     CBC & Differential; Future  -     Vitamin B12; Future  -     Vitamin D,25-Hydroxy; Future  -     TSH Rfx On Abnormal To Free T4; Future    4. Hyperlipidemia, mixed  Assessment & Plan:  Blood work    Orders:  -     Comprehensive Metabolic Panel; Future  -     Hemoglobin A1c; Future  -     Lipid Panel; Future  -     CBC & Differential; Future  -     Vitamin B12; Future  -     Vitamin D,25-Hydroxy; Future  -     TSH Rfx On Abnormal To Free T4; Future    5. Hyperglycemia  Assessment & Plan:  Blood work    Orders:  -     Comprehensive Metabolic Panel; Future  -     Hemoglobin A1c; Future  -     Lipid Panel; Future  -     CBC & Differential; Future  -     Vitamin B12; Future  -     Vitamin D,25-Hydroxy; Future  -     TSH Rfx On Abnormal To Free T4; Future    6. Vitamin D deficiency  Assessment & Plan:  Blood work    Orders:  -     Comprehensive Metabolic Panel; Future  -     Hemoglobin A1c; Future  -     Lipid Panel; Future  -     CBC & Differential;  Future  -     Vitamin B12; Future  -     Vitamin D,25-Hydroxy; Future  -     TSH Rfx On Abnormal To Free T4; Future    7. Chronic kidney disease, stage 3a  Assessment & Plan:  Patient is instructed to not take any NSAIDs.  Medicines as directed.  Stay well-hydrated.  We will get renal ultrasound    Orders:  -     Comprehensive Metabolic Panel; Future  -     Hemoglobin A1c; Future  -     Lipid Panel; Future  -     CBC & Differential; Future  -     Vitamin B12; Future  -     Vitamin D,25-Hydroxy; Future  -     TSH Rfx On Abnormal To Free T4; Future    8. Personal history of tobacco use, presenting hazards to health  -     Comprehensive Metabolic Panel; Future  -     Hemoglobin A1c; Future  -     Lipid Panel; Future  -     CBC & Differential; Future  -     Vitamin B12; Future  -     Vitamin D,25-Hydroxy; Future  -     TSH Rfx On Abnormal To Free T4; Future  -     CT Chest Without Contrast Diagnostic; Future    9. Encounter for immunization  -     Fluzone High-Dose 65+yrs    10. Pulmonary emphysema, unspecified emphysema type  -     CT Chest Without Contrast Diagnostic; Future             Follow Up:   Return in about 3 weeks (around 1/21/2025) for Annual physical.      Omer Diaz MD  [facility]

## 2024-12-31 NOTE — ASSESSMENT & PLAN NOTE
Over the last 3 months patient has been excessively tired and having sweating episodes both night and day.  She is getting progressively weaker.  She has been coughing in is not short of breath but is not energetic.  We are going to get Blood work today, CT scan of her chest, return to clinic in 3 weeks.  If these tests are good and she improves we will just monitor if anything shows up we will pursue.

## 2025-01-01 LAB
25(OH)D3+25(OH)D2 SERPL-MCNC: 33.1 NG/ML (ref 30–100)
ALBUMIN SERPL-MCNC: 4.2 G/DL (ref 3.8–4.8)
ALP SERPL-CCNC: 66 IU/L (ref 44–121)
ALT SERPL-CCNC: 9 IU/L (ref 0–32)
AST SERPL-CCNC: 21 IU/L (ref 0–40)
BASOPHILS # BLD AUTO: 0.1 X10E3/UL (ref 0–0.2)
BASOPHILS NFR BLD AUTO: 1 %
BILIRUB SERPL-MCNC: 0.3 MG/DL (ref 0–1.2)
BUN SERPL-MCNC: 17 MG/DL (ref 8–27)
BUN/CREAT SERPL: 17 (ref 12–28)
CALCIUM SERPL-MCNC: 9.6 MG/DL (ref 8.7–10.3)
CHLORIDE SERPL-SCNC: 100 MMOL/L (ref 96–106)
CHOLEST SERPL-MCNC: 237 MG/DL (ref 100–199)
CO2 SERPL-SCNC: 23 MMOL/L (ref 20–29)
CREAT SERPL-MCNC: 0.98 MG/DL (ref 0.57–1)
EGFRCR SERPLBLD CKD-EPI 2021: 58 ML/MIN/1.73
EOSINOPHIL # BLD AUTO: 0.2 X10E3/UL (ref 0–0.4)
EOSINOPHIL NFR BLD AUTO: 2 %
ERYTHROCYTE [DISTWIDTH] IN BLOOD BY AUTOMATED COUNT: 12.8 % (ref 11.7–15.4)
GLOBULIN SER CALC-MCNC: 2.5 G/DL (ref 1.5–4.5)
GLUCOSE SERPL-MCNC: 96 MG/DL (ref 70–99)
HBA1C MFR BLD: 5.8 % (ref 4.8–5.6)
HCT VFR BLD AUTO: 38.8 % (ref 34–46.6)
HDLC SERPL-MCNC: 67 MG/DL
HGB BLD-MCNC: 12.6 G/DL (ref 11.1–15.9)
IMM GRANULOCYTES # BLD AUTO: 0.1 X10E3/UL (ref 0–0.1)
IMM GRANULOCYTES NFR BLD AUTO: 1 %
LDLC SERPL CALC-MCNC: 133 MG/DL (ref 0–99)
LYMPHOCYTES # BLD AUTO: 2.7 X10E3/UL (ref 0.7–3.1)
LYMPHOCYTES NFR BLD AUTO: 30 %
MCH RBC QN AUTO: 29.5 PG (ref 26.6–33)
MCHC RBC AUTO-ENTMCNC: 32.5 G/DL (ref 31.5–35.7)
MCV RBC AUTO: 91 FL (ref 79–97)
MONOCYTES # BLD AUTO: 0.9 X10E3/UL (ref 0.1–0.9)
MONOCYTES NFR BLD AUTO: 10 %
NEUTROPHILS # BLD AUTO: 5.1 X10E3/UL (ref 1.4–7)
NEUTROPHILS NFR BLD AUTO: 56 %
PLATELET # BLD AUTO: 323 X10E3/UL (ref 150–450)
POTASSIUM SERPL-SCNC: 4.9 MMOL/L (ref 3.5–5.2)
PROT SERPL-MCNC: 6.7 G/DL (ref 6–8.5)
RBC # BLD AUTO: 4.27 X10E6/UL (ref 3.77–5.28)
SODIUM SERPL-SCNC: 137 MMOL/L (ref 134–144)
TRIGL SERPL-MCNC: 210 MG/DL (ref 0–149)
TSH SERPL DL<=0.005 MIU/L-ACNC: 2.59 UIU/ML (ref 0.45–4.5)
VIT B12 SERPL-MCNC: 434 PG/ML (ref 232–1245)
VLDLC SERPL CALC-MCNC: 37 MG/DL (ref 5–40)
WBC # BLD AUTO: 8.9 X10E3/UL (ref 3.4–10.8)

## 2025-01-06 DIAGNOSIS — J44.1 COPD WITH EXACERBATION: ICD-10-CM

## 2025-01-06 RX ORDER — FLUTICASONE FUROATE, UMECLIDINIUM BROMIDE AND VILANTEROL TRIFENATATE 100; 62.5; 25 UG/1; UG/1; UG/1
1 POWDER RESPIRATORY (INHALATION) DAILY
Qty: 60 EACH | Refills: 3 | Status: SHIPPED | OUTPATIENT
Start: 2025-01-06

## 2025-01-21 ENCOUNTER — TELEPHONE (OUTPATIENT)
Dept: FAMILY MEDICINE CLINIC | Facility: CLINIC | Age: 80
End: 2025-01-21

## 2025-01-21 ENCOUNTER — TELEPHONE (OUTPATIENT)
Dept: FAMILY MEDICINE CLINIC | Facility: CLINIC | Age: 80
End: 2025-01-21
Payer: MEDICARE

## 2025-01-21 DIAGNOSIS — E78.2 HYPERLIPIDEMIA, MIXED: ICD-10-CM

## 2025-01-21 RX ORDER — PRAVASTATIN SODIUM 40 MG
40 TABLET ORAL DAILY
Qty: 30 TABLET | Refills: 0 | Status: SHIPPED | OUTPATIENT
Start: 2025-01-21

## 2025-01-21 NOTE — TELEPHONE ENCOUNTER
Caller: Pilar Colon    Relationship: Self    Best call back number:[phone]    What is the best time to reach you: ANYTIME     Who are you requesting to speak with (clinical staff, provider,  specific staff member): CLINICAL STAFF     PATIENT IS WANTING SOMETHING TO REPLACE THE TRELEGY INHALER. PATIENT STATES SHE NEEDS TO GET HER CO PAY DOWN BEFORE SHE CAN AFFORD THE MEDICATION. PATIENT DOES WANT TO CONTINUE ONCE COPAY IS DOWN. PLEASE CALL TO DISCUSS.

## 2025-01-21 NOTE — TELEPHONE ENCOUNTER
Caller: SOY ACEVES     Relationship:PATIENT     Callback number: [phone]   Is it ok to leave a message: [x] Yes [] No    Requested medication for samples: TRELEGY INHALER     How much medication does the patient currently have left: NONE     Who will be picking up the samples: MAX SIEGEL     Do you need information about patient financial assistance for this medication: [] Yes [x] No    Additional details provided: PATIENT IS NEEDING SAMPLES DUE TO COST OF MEDICATION UNTIL SHE GETS HER CO PAY DOWN.

## 2025-01-28 ENCOUNTER — OFFICE VISIT (OUTPATIENT)
Dept: FAMILY MEDICINE CLINIC | Facility: CLINIC | Age: 80
End: 2025-01-28
Payer: MEDICARE

## 2025-01-28 VITALS
RESPIRATION RATE: 16 BRPM | DIASTOLIC BLOOD PRESSURE: 62 MMHG | OXYGEN SATURATION: 98 % | SYSTOLIC BLOOD PRESSURE: 122 MMHG | BODY MASS INDEX: 25.29 KG/M2 | HEART RATE: 68 BPM | HEIGHT: 60 IN | WEIGHT: 128.8 LBS

## 2025-01-28 DIAGNOSIS — N18.31 CHRONIC KIDNEY DISEASE, STAGE 3A: ICD-10-CM

## 2025-01-28 DIAGNOSIS — E55.9 VITAMIN D DEFICIENCY: ICD-10-CM

## 2025-01-28 DIAGNOSIS — I10 ESSENTIAL HYPERTENSION, BENIGN: ICD-10-CM

## 2025-01-28 DIAGNOSIS — R25.2 SPASM: Primary | ICD-10-CM

## 2025-01-28 DIAGNOSIS — R53.83 OTHER FATIGUE: ICD-10-CM

## 2025-01-28 DIAGNOSIS — I25.118 CORONARY ARTERY DISEASE OF NATIVE ARTERY OF NATIVE HEART WITH STABLE ANGINA PECTORIS: ICD-10-CM

## 2025-01-28 DIAGNOSIS — R73.9 HYPERGLYCEMIA: ICD-10-CM

## 2025-01-28 DIAGNOSIS — E78.2 HYPERLIPIDEMIA, MIXED: ICD-10-CM

## 2025-01-28 DIAGNOSIS — J44.89 CHRONIC BRONCHITIS WITH PULMONARY EMPHYSEMA: ICD-10-CM

## 2025-01-28 PROCEDURE — 3074F SYST BP LT 130 MM HG: CPT | Performed by: FAMILY MEDICINE

## 2025-01-28 PROCEDURE — 99214 OFFICE O/P EST MOD 30 MIN: CPT | Performed by: FAMILY MEDICINE

## 2025-01-28 PROCEDURE — 1126F AMNT PAIN NOTED NONE PRSNT: CPT | Performed by: FAMILY MEDICINE

## 2025-01-28 PROCEDURE — 3078F DIAST BP <80 MM HG: CPT | Performed by: FAMILY MEDICINE

## 2025-01-28 NOTE — ASSESSMENT & PLAN NOTE
Patient is doing okay today.  We gave her some Trelegy samples.  Return to clinic if worse.  Recheck in 6 months

## 2025-01-28 NOTE — PROGRESS NOTES
Patient Name: Pilar Colon  : 1945   MRN: [id number]     Chief Complaint:    Chief Complaint   Patient presents with    Hypertension     Patient is here today to follow up on Hypertension and lab work       History of Present Illness: Pilar Colon is a 79 y.o. female who is here today for follow up on fatigue, blood pressure, cholesterol, blood sugar.  HPI        Review of Systems:   Review of Systems   Constitutional: Negative.    HENT: Negative.     Eyes: Negative.    Respiratory: Negative.     Cardiovascular: Negative.    Gastrointestinal: Negative.    Neurological: Negative.         Past Medical History:   Past Medical History:   Diagnosis Date    Anemia     Anxiety     Arthritis     Asthma     Bronchitis     Chronic bronchitis with pulmonary emphysema 2015    COPD mixed type     Coronary arteriosclerosis in native artery 2010    Depression     Diverticulitis     Dupuytren contracture     Emphysema (subcutaneous) (surgical) resulting from a procedure     High risk medication use     History of degenerative disc disease     spine    History of left heart catheterization     STENT PLACED    Hx of adenomatous colonic polyps     Hyperlipidemia     Hypertension 2014    Low back pain     Mixed hyperlipidemia 2014    Osteoporosis     Paroxysmal A-fib     Peripheral polyneuropathy     Personal history of nicotine dependence     Pneumonia     Recurrent major depressive disorder in partial remission     Stress incontinence     Vertigo     Vitamin D deficiency        Past Surgical History:   Past Surgical History:   Procedure Laterality Date    ARTERY SURGERY      STENT LAD    BREAST BIOPSY Right     [place], Dr. Severo Kay    BREAST LUMPECTOMY      BREAST LUMPECTOMY Left     HAND SURGERY Left     Arthritis surgery, [place], [place]    SHOULDER SURGERY Bilateral     [place]. Dr. Dixon    SHOULDER SURGERY Bilateral     TUBAL ABDOMINAL LIGATION         Family History:    Family History   Problem Relation Age of Onset    Liver cancer Father     Diabetes Sister 53    Heart disease Sister 59        Undefined       Social History:   Social History     Socioeconomic History    Marital status: Single   Tobacco Use    Smoking status: Former     Current packs/day: 0.00     Average packs/day: 0.3 packs/day for 50.0 years (12.5 ttl pk-yrs)     Types: Cigarettes     Start date:      Quit date:      Years since quittin.0     Passive exposure: Past    Smokeless tobacco: Never    Tobacco comments:     Pt started smoking in  quit for 15 years and started back    Vaping Use    Vaping status: Never Used   Substance and Sexual Activity    Alcohol use: Yes     Comment: 1 Pint per week, SATURDAY NIGHT LAST DRINK     Drug use: Never    Sexual activity: Defer       Medications:     Current Outpatient Medications:     alendronate (Fosamax) 70 MG tablet, Take 1 tablet by mouth Every 7 (Seven) Days., Disp: 12 tablet, Rfl: 1    baclofen (LIORESAL) 10 MG tablet, Take 1 tablet by mouth 2 (Two) Times a Day., Disp: , Rfl:     busPIRone (BUSPAR) 10 MG tablet, TAKE 1 TABLET BY MOUTH TWICE DAILY, Disp: 180 tablet, Rfl: 0    dilTIAZem (Tiadylt ER) 240 MG 24 hr capsule, Take 1 capsule by mouth Daily., Disp: 90 capsule, Rfl: 1    Fluticasone-Umeclidin-Vilant (Trelegy Ellipta) 100-62.5-25 MCG/ACT inhaler, INHALE 1 PUFF BY MOUTH DAILY, Disp: 60 each, Rfl: 3    furosemide (LASIX) 20 MG tablet, TAKE 1 TABLET BY MOUTH DAILY AS NEEDED FOR SWELLING, Disp: 30 tablet, Rfl: 0    gabapentin (NEURONTIN) 400 MG capsule, Take 1 capsule by mouth 3 (Three) Times a Day., Disp: , Rfl:     HYDROcodone-acetaminophen (NORCO)  MG per tablet, TAKE 1 TABLET BY MOUTH EVERY 6 HOURS. DO NOT FILL ANY EARLIER THAN 30 DAYS, Disp: , Rfl:     ipratropium-albuterol (DUO-NEB) 0.5-2.5 mg/3 ml nebulizer, Take 3 mL by nebulization Every 4 (Four) Hours As Needed for Wheezing or Shortness of Air., Disp: 360 mL, Rfl: 0     "losartan (COZAAR) 50 MG tablet, TAKE 1 TABLET BY MOUTH DAILY, Disp: 90 tablet, Rfl: 1    meclizine (ANTIVERT) 25 MG tablet, Take 1 tablet by mouth 3 (Three) Times a Day As Needed for Dizziness or Nausea., Disp: 90 tablet, Rfl: 1    omeprazole (priLOSEC) 40 MG capsule, TAKE 1 CAPSULE BY MOUTH DAILY, Disp: 90 capsule, Rfl: 0    PARoxetine (PAXIL) 40 MG tablet, TAKE 1 TABLET BY MOUTH EVERY MORNING, Disp: 90 tablet, Rfl: 0    pravastatin (PRAVACHOL) 40 MG tablet, TAKE 1 TABLET BY MOUTH DAILY, Disp: 30 tablet, Rfl: 0    Allergies:   Allergies   Allergen Reactions    Codeine Rash    Tramadol Swelling and Nausea Only         Physical Exam:  Vital Signs:   Vitals:    01/28/25 1120   BP: 122/62   BP Location: Left arm   Patient Position: Sitting   Cuff Size: Adult   Pulse: 68   Resp: 16   SpO2: 98%   Weight: 58.4 kg (128 lb 12.8 oz)   Height: 152.4 cm (60\")     Body mass index is 25.15 kg/m².     Physical Exam  Vitals and nursing note reviewed.   Constitutional:       Appearance: Normal appearance. She is normal weight.   HENT:      Head: Normocephalic and atraumatic.      Right Ear: Tympanic membrane, ear canal and external ear normal.      Left Ear: Tympanic membrane, ear canal and external ear normal.      Nose: Nose normal.      Mouth/Throat:      Mouth: Mucous membranes are dry.      Pharynx: Oropharynx is clear.   Eyes:      Extraocular Movements: Extraocular movements intact.      Conjunctiva/sclera: Conjunctivae normal.      Pupils: Pupils are equal, round, and reactive to light.   Cardiovascular:      Rate and Rhythm: Normal rate and regular rhythm.      Pulses: Normal pulses.      Heart sounds: Normal heart sounds.   Pulmonary:      Effort: Pulmonary effort is normal.      Breath sounds: Normal breath sounds.   Musculoskeletal:      Cervical back: Normal range of motion and neck supple.   Feet:      Comments:      Neurological:      Mental Status: She is alert.         Procedures      Assessment/Plan:   Diagnoses " and all orders for this visit:    1. Spasm (Primary)  -     Hemoglobin A1c; Future  -     Vitamin B12; Future  -     Vitamin D,25-Hydroxy; Future  -     Lipid Panel; Future  -     Comprehensive Metabolic Panel; Future  -     TSH Rfx On Abnormal To Free T4; Future  -     CBC & Differential; Future    2. Coronary artery disease of native artery of native heart with stable angina pectoris  Assessment & Plan:  Aggressive risk factor modification.    Orders:  -     Hemoglobin A1c; Future  -     Vitamin B12; Future  -     Vitamin D,25-Hydroxy; Future  -     Lipid Panel; Future  -     Comprehensive Metabolic Panel; Future  -     TSH Rfx On Abnormal To Free T4; Future  -     CBC & Differential; Future    3. Essential hypertension, benign  Assessment & Plan:  Discussed with patient to monitor their blood pressure and if systolic blood pressure goes above 140 or diastolic is above 90 to return to clinic.  Take medicines as directed, call for any problems, patient not having or any worrisome symptoms.        Orders:  -     Hemoglobin A1c; Future  -     Vitamin B12; Future  -     Vitamin D,25-Hydroxy; Future  -     Lipid Panel; Future  -     Comprehensive Metabolic Panel; Future  -     TSH Rfx On Abnormal To Free T4; Future  -     CBC & Differential; Future    4. Hyperlipidemia, mixed  Assessment & Plan:  HDL 67.  .  Triglycerides 210.  Recheck in 6 months.    Orders:  -     Hemoglobin A1c; Future  -     Vitamin B12; Future  -     Vitamin D,25-Hydroxy; Future  -     Lipid Panel; Future  -     Comprehensive Metabolic Panel; Future  -     TSH Rfx On Abnormal To Free T4; Future  -     CBC & Differential; Future    5. Hyperglycemia  Assessment & Plan:  A1c is 5.8.  Recheck in 6 months.    Orders:  -     Hemoglobin A1c; Future  -     Vitamin B12; Future  -     Vitamin D,25-Hydroxy; Future  -     Lipid Panel; Future  -     Comprehensive Metabolic Panel; Future  -     TSH Rfx On Abnormal To Free T4; Future  -     CBC &  Differential; Future    6. Vitamin D deficiency  Assessment & Plan:  Vitamin D is 33.1.  Recheck in 6 months.  Continue replacement.    Orders:  -     Hemoglobin A1c; Future  -     Vitamin B12; Future  -     Vitamin D,25-Hydroxy; Future  -     Lipid Panel; Future  -     Comprehensive Metabolic Panel; Future  -     TSH Rfx On Abnormal To Free T4; Future  -     CBC & Differential; Future    7. Chronic kidney disease, stage 3a  Assessment & Plan:  GFR is 58.Patient is instructed to not take any NSAIDs.  Medicines as directed.  Stay well-hydrated.      Orders:  -     Hemoglobin A1c; Future  -     Vitamin B12; Future  -     Vitamin D,25-Hydroxy; Future  -     Lipid Panel; Future  -     Comprehensive Metabolic Panel; Future  -     TSH Rfx On Abnormal To Free T4; Future  -     CBC & Differential; Future    8. Other fatigue  Assessment & Plan:  Blood work was fairly unremarkable.  CBC was normal.  CT scan is pending    Orders:  -     Hemoglobin A1c; Future  -     Vitamin B12; Future  -     Vitamin D,25-Hydroxy; Future  -     Lipid Panel; Future  -     Comprehensive Metabolic Panel; Future  -     TSH Rfx On Abnormal To Free T4; Future  -     CBC & Differential; Future    9. Chronic bronchitis with pulmonary emphysema  Assessment & Plan:  Patient is doing okay today.  We gave her some Trelegy samples.  Return to clinic if worse.  Recheck in 6 months    Orders:  -     Hemoglobin A1c; Future  -     Vitamin B12; Future  -     Vitamin D,25-Hydroxy; Future  -     Lipid Panel; Future  -     Comprehensive Metabolic Panel; Future  -     TSH Rfx On Abnormal To Free T4; Future  -     CBC & Differential; Future             Follow Up:   Return in about 6 months (around 7/28/2025) for Annual physical, Bloodwork 1 week prior to next appointment.      Omer Diaz MD  [facility]

## 2025-02-13 DIAGNOSIS — R42 VERTIGO: ICD-10-CM

## 2025-02-13 RX ORDER — MECLIZINE HYDROCHLORIDE 25 MG/1
TABLET ORAL
Qty: 90 TABLET | Refills: 1 | Status: SHIPPED | OUTPATIENT
Start: 2025-02-13

## 2025-02-13 RX ORDER — FUROSEMIDE 20 MG/1
20 TABLET ORAL DAILY PRN
Qty: 30 TABLET | Refills: 0 | Status: SHIPPED | OUTPATIENT
Start: 2025-02-13

## 2025-02-13 NOTE — TELEPHONE ENCOUNTER
Rx Refill Note    Requested Prescriptions     Pending Prescriptions Disp Refills    meclizine (ANTIVERT) 25 MG tablet [Pharmacy Med Name: MECLIZINE 25MG RX TABLETS] 90 tablet 1     Sig: TAKE 1 TABLET BY MOUTH THREE TIMES DAILY AS NEEDED FOR DIZZINESS OR NAUSEA        Last office visit with prescribing clinician: 1/28/2025      Next office visit with prescribing clinician: Visit date not found   Last labs:   Last refill: 09/06/2024   Pharmacy (be sure to add in Epic). correct

## 2025-02-18 ENCOUNTER — TELEPHONE (OUTPATIENT)
Dept: FAMILY MEDICINE CLINIC | Facility: CLINIC | Age: 80
End: 2025-02-18

## 2025-02-18 NOTE — TELEPHONE ENCOUNTER
Caller: Pilar Colon    Relationship: Self    Best call back number:   Telephone Information:   Mobile [phone]        What medication are you requesting: ANTIBIOTIC FOR UTI    What are your current symptoms: BURNING WHEN PEEING    How long have you been experiencing symptoms: ABOUT A DAY    Have you had these symptoms before:    [x] Yes  [] No    Have you been treated for these symptoms before:   [x] Yes  [] No    If a prescription is needed, what is your preferred pharmacy and phone number: [id number] [place] - [facility] [address] AT [facility] [address] - [phone]  - [phone] FX     Additional notes:

## 2025-02-18 NOTE — TELEPHONE ENCOUNTER
HUB TO RELAY    CALLED PT TO GET HER SCHEDULED FOR AN APPT FOR UTI, ALSO INFORMED HER OF OUR URGENT TREATMENT. PT DECLINED AND STATED SHE WOULD CALL BACK IF SHE WANTED TO BE SEEN

## 2025-02-21 DIAGNOSIS — E78.2 HYPERLIPIDEMIA, MIXED: ICD-10-CM

## 2025-02-21 RX ORDER — PRAVASTATIN SODIUM 40 MG
40 TABLET ORAL DAILY
Qty: 30 TABLET | Refills: 0 | Status: SHIPPED | OUTPATIENT
Start: 2025-02-21

## 2025-02-26 ENCOUNTER — TRANSCRIBE ORDERS (OUTPATIENT)
Dept: GENERAL RADIOLOGY | Facility: CLINIC | Age: 80
End: 2025-02-26
Payer: MEDICARE

## 2025-02-26 DIAGNOSIS — M54.50 LOW BACK PAIN, UNSPECIFIED BACK PAIN LATERALITY, UNSPECIFIED CHRONICITY, UNSPECIFIED WHETHER SCIATICA PRESENT: Primary | ICD-10-CM

## 2025-02-26 DIAGNOSIS — M25.552 BILATERAL HIP PAIN: ICD-10-CM

## 2025-02-26 DIAGNOSIS — M25.551 BILATERAL HIP PAIN: ICD-10-CM

## 2025-03-08 ENCOUNTER — OFFICE VISIT (OUTPATIENT)
Dept: FAMILY MEDICINE CLINIC | Facility: CLINIC | Age: 80
End: 2025-03-08
Payer: MEDICARE

## 2025-03-08 VITALS
OXYGEN SATURATION: 98 % | DIASTOLIC BLOOD PRESSURE: 82 MMHG | RESPIRATION RATE: 16 BRPM | BODY MASS INDEX: 25.97 KG/M2 | SYSTOLIC BLOOD PRESSURE: 144 MMHG | HEIGHT: 60 IN | WEIGHT: 132.3 LBS

## 2025-03-08 DIAGNOSIS — J43.2 CENTRILOBULAR EMPHYSEMA: ICD-10-CM

## 2025-03-08 DIAGNOSIS — N30.90 CYSTITIS: Primary | ICD-10-CM

## 2025-03-08 DIAGNOSIS — I10 ESSENTIAL HYPERTENSION, BENIGN: ICD-10-CM

## 2025-03-08 DIAGNOSIS — K21.9 GASTROESOPHAGEAL REFLUX DISEASE WITHOUT ESOPHAGITIS: ICD-10-CM

## 2025-03-08 DIAGNOSIS — R91.8 ABNORMAL CT LUNG SCREENING: ICD-10-CM

## 2025-03-08 LAB
BILIRUB BLD-MCNC: NEGATIVE MG/DL
CLARITY, POC: CLEAR
COLOR UR: YELLOW
EXPIRATION DATE: ABNORMAL
GLUCOSE UR STRIP-MCNC: NEGATIVE MG/DL
KETONES UR QL: NEGATIVE
LEUKOCYTE EST, POC: ABNORMAL
Lab: ABNORMAL
NITRITE UR-MCNC: NEGATIVE MG/ML
PH UR: 6 [PH] (ref 5–8)
PROT UR STRIP-MCNC: NEGATIVE MG/DL
RBC # UR STRIP: ABNORMAL /UL
SP GR UR: 1.02 (ref 1–1.03)
UROBILINOGEN UR QL: NORMAL

## 2025-03-08 PROCEDURE — 3079F DIAST BP 80-89 MM HG: CPT | Performed by: FAMILY MEDICINE

## 2025-03-08 PROCEDURE — 81003 URINALYSIS AUTO W/O SCOPE: CPT | Performed by: FAMILY MEDICINE

## 2025-03-08 PROCEDURE — 1125F AMNT PAIN NOTED PAIN PRSNT: CPT | Performed by: FAMILY MEDICINE

## 2025-03-08 PROCEDURE — 99214 OFFICE O/P EST MOD 30 MIN: CPT | Performed by: FAMILY MEDICINE

## 2025-03-08 PROCEDURE — 3077F SYST BP >= 140 MM HG: CPT | Performed by: FAMILY MEDICINE

## 2025-03-08 RX ORDER — LOSARTAN POTASSIUM 50 MG/1
50 TABLET ORAL DAILY
Qty: 90 TABLET | Refills: 1 | Status: SHIPPED | OUTPATIENT
Start: 2025-03-08

## 2025-03-08 RX ORDER — PREDNISONE 10 MG/1
10 TABLET ORAL DAILY
Qty: 30 TABLET | Refills: 0 | Status: SHIPPED | OUTPATIENT
Start: 2025-03-08

## 2025-03-08 RX ORDER — FUROSEMIDE 20 MG/1
20 TABLET ORAL DAILY PRN
Qty: 30 TABLET | Refills: 0 | Status: SHIPPED | OUTPATIENT
Start: 2025-03-08

## 2025-03-08 RX ORDER — DILTIAZEM HYDROCHLORIDE 240 MG/1
240 CAPSULE, EXTENDED RELEASE ORAL DAILY
Qty: 90 CAPSULE | Refills: 1 | Status: SHIPPED | OUTPATIENT
Start: 2025-03-08

## 2025-03-08 RX ORDER — DOXYCYCLINE 100 MG/1
100 TABLET ORAL 2 TIMES DAILY
Qty: 20 TABLET | Refills: 0 | Status: SHIPPED | OUTPATIENT
Start: 2025-03-08

## 2025-03-08 NOTE — PROGRESS NOTES
Patient Name: Pilar Colon  : 1945   MRN: [id number]     Chief Complaint:    Chief Complaint   Patient presents with    folllow up imaging     Urinary Tract Infection       History of Present Illness: Pilar Colon is a 79 y.o. female who is here today for follow up on CT, blood pressure, and urine.  HPI        Review of Systems:   Review of Systems   Constitutional: Negative.    HENT: Negative.     Eyes: Negative.    Respiratory:  Positive for cough.    Cardiovascular: Negative.    Gastrointestinal: Negative.    Neurological: Negative.         Past Medical History:   Past Medical History:   Diagnosis Date    Anemia     Anxiety     Arthritis     Asthma     Bronchitis     Chronic bronchitis with pulmonary emphysema 2015    COPD mixed type     Coronary arteriosclerosis in native artery 2010    Depression     Diverticulitis     Dupuytren contracture     Emphysema (subcutaneous) (surgical) resulting from a procedure     High risk medication use     History of degenerative disc disease     spine    History of left heart catheterization     STENT PLACED    Hx of adenomatous colonic polyps     Hyperlipidemia     Hypertension 2014    Low back pain     Mixed hyperlipidemia 2014    Osteoporosis     Paroxysmal A-fib     Peripheral polyneuropathy     Personal history of nicotine dependence     Pneumonia     Recurrent major depressive disorder in partial remission     Stress incontinence     Vertigo     Vitamin D deficiency        Past Surgical History:   Past Surgical History:   Procedure Laterality Date    ARTERY SURGERY      STENT LAD    BREAST BIOPSY Right     [place], Dr. Severo Kay    BREAST LUMPECTOMY      BREAST LUMPECTOMY Left     HAND SURGERY Left     Arthritis surgery, [place], [place]    SHOULDER SURGERY Bilateral     [place]. Dr. Dixon    SHOULDER SURGERY Bilateral     TUBAL ABDOMINAL LIGATION         Family History:   Family History   Problem Relation Age of Onset     Liver cancer Father     Diabetes Sister 53    Heart disease Sister 59        Undefined       Social History:   Social History     Socioeconomic History    Marital status: Single   Tobacco Use    Smoking status: Former     Current packs/day: 0.00     Average packs/day: 0.3 packs/day for 50.0 years (12.5 ttl pk-yrs)     Types: Cigarettes     Start date:      Quit date:      Years since quittin.1     Passive exposure: Past    Smokeless tobacco: Never    Tobacco comments:     Pt started smoking in  quit for 15 years and started back    Vaping Use    Vaping status: Never Used   Substance and Sexual Activity    Alcohol use: Yes     Comment: 1 Pint per week, SATURDAY NIGHT LAST DRINK     Drug use: Never    Sexual activity: Defer       Medications:     Current Outpatient Medications:     alendronate (Fosamax) 70 MG tablet, Take 1 tablet by mouth Every 7 (Seven) Days., Disp: 12 tablet, Rfl: 1    baclofen (LIORESAL) 10 MG tablet, Take 1 tablet by mouth 2 (Two) Times a Day., Disp: , Rfl:     busPIRone (BUSPAR) 10 MG tablet, TAKE 1 TABLET BY MOUTH TWICE DAILY, Disp: 180 tablet, Rfl: 0    dilTIAZem (Tiadylt ER) 240 MG 24 hr capsule, Take 1 capsule by mouth Daily., Disp: 90 capsule, Rfl: 1    Fluticasone-Umeclidin-Vilant (Trelegy Ellipta) 100-62.5-25 MCG/ACT inhaler, INHALE 1 PUFF BY MOUTH DAILY, Disp: 60 each, Rfl: 3    furosemide (LASIX) 20 MG tablet, TAKE 1 TABLET BY MOUTH DAILY AS NEEDED FOR SWELLING, Disp: 30 tablet, Rfl: 0    gabapentin (NEURONTIN) 400 MG capsule, Take 1 capsule by mouth 3 (Three) Times a Day., Disp: , Rfl:     HYDROcodone-acetaminophen (NORCO)  MG per tablet, TAKE 1 TABLET BY MOUTH EVERY 6 HOURS. DO NOT FILL ANY EARLIER THAN 30 DAYS, Disp: , Rfl:     ipratropium-albuterol (DUO-NEB) 0.5-2.5 mg/3 ml nebulizer, Take 3 mL by nebulization Every 4 (Four) Hours As Needed for Wheezing or Shortness of Air., Disp: 360 mL, Rfl: 0    losartan (COZAAR) 50 MG tablet, TAKE 1 TABLET BY MOUTH  "DAILY, Disp: 90 tablet, Rfl: 1    meclizine (ANTIVERT) 25 MG tablet, TAKE 1 TABLET BY MOUTH THREE TIMES DAILY AS NEEDED FOR DIZZINESS OR NAUSEA, Disp: 90 tablet, Rfl: 1    omeprazole (priLOSEC) 40 MG capsule, TAKE 1 CAPSULE BY MOUTH DAILY, Disp: 90 capsule, Rfl: 0    PARoxetine (PAXIL) 40 MG tablet, TAKE 1 TABLET BY MOUTH EVERY MORNING, Disp: 90 tablet, Rfl: 0    amoxicillin-clavulanate (AUGMENTIN) 875-125 MG per tablet, Take 1 tablet by mouth 2 (Two) Times a Day., Disp: 20 tablet, Rfl: 0    doxycycline (ADOXA) 100 MG tablet, Take 1 tablet by mouth 2 (Two) Times a Day., Disp: 20 tablet, Rfl: 0    pravastatin (PRAVACHOL) 40 MG tablet, TAKE 1 TABLET BY MOUTH DAILY, Disp: 30 tablet, Rfl: 0    predniSONE (DELTASONE) 10 MG tablet, Take 1 tablet by mouth Daily. 5 po once a day for 2 days, 4 po once a day for 2 days, 3 po once a day for 2 days, 2 po once a day for 2 days, 1 po once a day for 2 days, Disp: 30 tablet, Rfl: 0    Allergies:   Allergies   Allergen Reactions    Codeine Rash    Tramadol Swelling and Nausea Only         Physical Exam:  Vital Signs:   Vitals:    03/08/25 0940   BP: 144/82   BP Location: Left arm   Patient Position: Sitting   Cuff Size: Adult   Resp: 16   SpO2: 98%   Weight: 60 kg (132 lb 4.8 oz)   Height: 152.4 cm (60\")   PainSc: 8    PainLoc: Generalized     Body mass index is 25.84 kg/m².     Physical Exam  Vitals and nursing note reviewed.   Constitutional:       Appearance: Normal appearance. She is normal weight.   HENT:      Head: Normocephalic and atraumatic.      Right Ear: Tympanic membrane, ear canal and external ear normal.      Left Ear: Tympanic membrane, ear canal and external ear normal.      Nose: Nose normal.      Mouth/Throat:      Mouth: Mucous membranes are dry.      Pharynx: Oropharynx is clear.   Eyes:      Extraocular Movements: Extraocular movements intact.      Conjunctiva/sclera: Conjunctivae normal.      Pupils: Pupils are equal, round, and reactive to light. "   Cardiovascular:      Rate and Rhythm: Normal rate and regular rhythm.      Pulses: Normal pulses.      Heart sounds: Normal heart sounds.   Pulmonary:      Effort: Pulmonary effort is normal.      Breath sounds: Normal breath sounds.   Musculoskeletal:      Cervical back: Normal range of motion and neck supple.   Feet:      Comments:      Neurological:      Mental Status: She is alert.         Procedures      Assessment/Plan:   Diagnoses and all orders for this visit:    1. Cystitis (Primary)  Assessment & Plan:  Augmentin.    Orders:  -     POCT urinalysis dipstick, automated  -     Urine Culture - Urine, Urine, Clean Catch; Future    2. Abnormal CT lung screening  Assessment & Plan:  Reviewed CT with patient.  We are going to repeat in August 2025      3. Centrilobular emphysema  Assessment & Plan:  Patient is having a mild exacerbation.  I am going to give her some Augmentin and doxycycline.  I am also give her a prednisone taper.  She is willing to see a pulmonologist now.  Will give her samples of Trelegy as she cannot afford it.    Orders:  -     Ambulatory Referral to Pulmonology    Other orders  -     amoxicillin-clavulanate (AUGMENTIN) 875-125 MG per tablet; Take 1 tablet by mouth 2 (Two) Times a Day.  Dispense: 20 tablet; Refill: 0  -     doxycycline (ADOXA) 100 MG tablet; Take 1 tablet by mouth 2 (Two) Times a Day.  Dispense: 20 tablet; Refill: 0  -     predniSONE (DELTASONE) 10 MG tablet; Take 1 tablet by mouth Daily. 5 po once a day for 2 days, 4 po once a day for 2 days, 3 po once a day for 2 days, 2 po once a day for 2 days, 1 po once a day for 2 days  Dispense: 30 tablet; Refill: 0             Follow Up:   No follow-ups on file.      Omer Diaz MD  [facility]

## 2025-03-08 NOTE — ASSESSMENT & PLAN NOTE
Patient is having a mild exacerbation.  I am going to give her some Augmentin and doxycycline.  I am also give her a prednisone taper.  She is willing to see a pulmonologist now.  Will give her samples of Trelegy as she cannot afford it.

## 2025-03-10 RX ORDER — ALENDRONATE SODIUM 70 MG/1
70 TABLET ORAL
Qty: 12 TABLET | Refills: 1 | Status: SHIPPED | OUTPATIENT
Start: 2025-03-10

## 2025-03-13 ENCOUNTER — TELEPHONE (OUTPATIENT)
Dept: FAMILY MEDICINE CLINIC | Facility: CLINIC | Age: 80
End: 2025-03-13

## 2025-03-13 ENCOUNTER — OFFICE VISIT (OUTPATIENT)
Dept: FAMILY MEDICINE CLINIC | Facility: CLINIC | Age: 80
End: 2025-03-13
Payer: MEDICARE

## 2025-03-13 VITALS
RESPIRATION RATE: 16 BRPM | WEIGHT: 132.4 LBS | OXYGEN SATURATION: 94 % | HEART RATE: 76 BPM | DIASTOLIC BLOOD PRESSURE: 92 MMHG | HEIGHT: 60 IN | BODY MASS INDEX: 26 KG/M2 | SYSTOLIC BLOOD PRESSURE: 144 MMHG

## 2025-03-13 DIAGNOSIS — J44.89 CHRONIC BRONCHITIS WITH PULMONARY EMPHYSEMA: Primary | ICD-10-CM

## 2025-03-13 RX ORDER — METHYLPREDNISOLONE SODIUM SUCCINATE 125 MG/2ML
125 INJECTION INTRAMUSCULAR; INTRAVENOUS EVERY 6 HOURS
Status: SHIPPED | OUTPATIENT
Start: 2025-03-13

## 2025-03-13 RX ADMIN — METHYLPREDNISOLONE SODIUM SUCCINATE 125 MG: 125 INJECTION INTRAMUSCULAR; INTRAVENOUS at 14:55

## 2025-03-13 NOTE — ASSESSMENT & PLAN NOTE
Patient is having a COPD exacerbation.  Her O2 sat is 95%.  She is breathing pretty quickly I think about 28 times per minute.  I wanted her to go to the ER if she refuses.  She knows this is AMA.  She is going to continue the doxycycline and Augmentin.  Her EKG is normal.  She is agreed that if she gets worse to go to emergency room.  I am going to give her a booster dose of 125 mg of Solu-Medrol.  She will get a chest x-ray.  I have discussed this with Caprice the pharmacist at the hospital.

## 2025-03-13 NOTE — TELEPHONE ENCOUNTER
Caller: Pilar Colon    Relationship: Self    Best call back number: [phone]    What is the best time to reach you: ANYTIME     Who are you requesting to speak with (clinical staff, provider,  specific staff member): CLINICAL STAFF     PATIENT STATES SHE HAD TO CALL AMBULANCE THIS MORNING DUE TO BREAHTING ISSUES. PATIENT IS WANTING DR. DAI TO ORDER A OXYGEN CONCENTRATOR SO SHE CAN BREATH BETTER. PATIENT DECLINED EMERGENCY ROOM WITH BOTH AMBULANCE AND HUB OFFERING. PLEASE CALL TO DISCUSS.

## 2025-03-13 NOTE — PROGRESS NOTES
Patient Name: Pilar Colon  : 1945   MRN: [id number]     Chief Complaint:  No chief complaint on file.      History of Present Illness: Pilar Colon is a 79 y.o. female who is here today for follow up on difficulty breathing.  HPI        Review of Systems:   Review of Systems   Constitutional: Negative.  Positive for fatigue.   HENT: Negative.     Eyes: Negative.    Respiratory: Negative.  Positive for shortness of breath.    Cardiovascular: Negative.  Negative for chest pain.   Gastrointestinal: Negative.    Neurological: Negative.         Past Medical History:   Past Medical History:   Diagnosis Date    Anemia     Anxiety     Arthritis     Asthma     Bronchitis     Chronic bronchitis with pulmonary emphysema 2015    COPD mixed type     Coronary arteriosclerosis in native artery 2010    Depression     Diverticulitis     Dupuytren contracture     Emphysema (subcutaneous) (surgical) resulting from a procedure     High risk medication use     History of degenerative disc disease     spine    History of left heart catheterization     STENT PLACED    Hx of adenomatous colonic polyps     Hyperlipidemia     Hypertension 2014    Low back pain     Mixed hyperlipidemia 2014    Osteoporosis     Paroxysmal A-fib     Peripheral polyneuropathy     Personal history of nicotine dependence     Pneumonia     Recurrent major depressive 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 Severo Kay    BREAST LUMPECTOMY      BREAST LUMPECTOMY Left     HAND SURGERY Left     Arthritis surgery, [place], [place]    SHOULDER SURGERY Bilateral     [place]. Dr. Dixon    SHOULDER SURGERY Bilateral     TUBAL ABDOMINAL LIGATION         Family History:   Family History   Problem Relation Age of Onset     Liver cancer Father     Diabetes Sister 53    Heart disease Sister 59        Undefined       Social History:   Social History     Socioeconomic History    Marital status: Single   Tobacco Use    Smoking status: Former     Current packs/day: 0.00     Average packs/day: 0.3 packs/day for 50.0 years (12.5 ttl pk-yrs)     Types: Cigarettes     Start date:      Quit date:      Years since quittin.1     Passive exposure: Past    Smokeless tobacco: Never    Tobacco comments:     Pt started smoking in  quit for 15 years and started back    Vaping Use    Vaping status: Never Used   Substance and Sexual Activity    Alcohol use: Yes     Comment: 1 Pint per week, SATURDAY NIGHT LAST DRINK     Drug use: Never    Sexual activity: Defer       Medications:     Current Outpatient Medications:     alendronate (FOSAMAX) 70 MG tablet, TAKE 1 TABLET BY MOUTH EVERY 7 DAYS, Disp: 12 tablet, Rfl: 1    amoxicillin-clavulanate (AUGMENTIN) 875-125 MG per tablet, Take 1 tablet by mouth 2 (Two) Times a Day., Disp: 20 tablet, Rfl: 0    baclofen (LIORESAL) 10 MG tablet, Take 1 tablet by mouth 2 (Two) Times a Day., Disp: , Rfl:     busPIRone (BUSPAR) 10 MG tablet, TAKE 1 TABLET BY MOUTH TWICE DAILY, Disp: 180 tablet, Rfl: 0    dilTIAZem (TIAZAC) 240 MG 24 hr capsule, TAKE 1 CAPSULE BY MOUTH DAILY, Disp: 90 capsule, Rfl: 1    doxycycline (ADOXA) 100 MG tablet, Take 1 tablet by mouth 2 (Two) Times a Day., Disp: 20 tablet, Rfl: 0    Fluticasone-Umeclidin-Vilant (Trelegy Ellipta) 100-62.5-25 MCG/ACT inhaler, INHALE 1 PUFF BY MOUTH DAILY, Disp: 60 each, Rfl: 3    furosemide (LASIX) 20 MG tablet, TAKE 1 TABLET BY MOUTH DAILY AS NEEDED FOR SWELLING, Disp: 30 tablet, Rfl: 0    gabapentin (NEURONTIN) 400 MG capsule, Take 1 capsule by mouth 3 (Three) Times a Day., Disp: , Rfl:     HYDROcodone-acetaminophen (NORCO)  MG per tablet, TAKE 1 TABLET BY MOUTH EVERY 6 HOURS. DO NOT FILL ANY EARLIER THAN 30 DAYS, Disp: , Rfl:      "ipratropium-albuterol (DUO-NEB) 0.5-2.5 mg/3 ml nebulizer, Take 3 mL by nebulization Every 4 (Four) Hours As Needed for Wheezing or Shortness of Air., Disp: 360 mL, Rfl: 0    losartan (COZAAR) 50 MG tablet, TAKE 1 TABLET BY MOUTH DAILY, Disp: 90 tablet, Rfl: 1    meclizine (ANTIVERT) 25 MG tablet, TAKE 1 TABLET BY MOUTH THREE TIMES DAILY AS NEEDED FOR DIZZINESS OR NAUSEA, Disp: 90 tablet, Rfl: 1    omeprazole (priLOSEC) 40 MG capsule, TAKE 1 CAPSULE BY MOUTH DAILY, Disp: 90 capsule, Rfl: 0    PARoxetine (PAXIL) 40 MG tablet, TAKE 1 TABLET BY MOUTH EVERY MORNING, Disp: 90 tablet, Rfl: 0    pravastatin (PRAVACHOL) 40 MG tablet, TAKE 1 TABLET BY MOUTH DAILY, Disp: 30 tablet, Rfl: 0    predniSONE (DELTASONE) 10 MG tablet, Take 1 tablet by mouth Daily. 5 po once a day for 2 days, 4 po once a day for 2 days, 3 po once a day for 2 days, 2 po once a day for 2 days, 1 po once a day for 2 days, Disp: 30 tablet, Rfl: 0    Allergies:   Allergies   Allergen Reactions    Codeine Rash    Tramadol Swelling and Nausea Only         Physical Exam:  Vital Signs:   Vitals:    03/13/25 1420   BP: 144/92   BP Location: Left arm   Patient Position: Sitting   Cuff Size: Adult   Pulse: 76   Resp: 16   SpO2: 94%   Weight: 60.1 kg (132 lb 6.4 oz)   Height: 152.4 cm (60\")   PainSc: 0-No pain     Body mass index is 25.86 kg/m².     Physical Exam  Vitals and nursing note reviewed.   Constitutional:       Appearance: Normal appearance. She is normal weight.   HENT:      Head: Normocephalic and atraumatic.      Right Ear: Tympanic membrane, ear canal and external ear normal.      Left Ear: Tympanic membrane, ear canal and external ear normal.      Nose: Nose normal.      Mouth/Throat:      Mouth: Mucous membranes are dry.      Pharynx: Oropharynx is clear.   Eyes:      Extraocular Movements: Extraocular movements intact.      Conjunctiva/sclera: Conjunctivae normal.      Pupils: Pupils are equal, round, and reactive to light.   Cardiovascular:      " Rate and Rhythm: Normal rate and regular rhythm.      Pulses: Normal pulses.      Heart sounds: Normal heart sounds.   Pulmonary:      Effort: Pulmonary effort is normal.      Breath sounds: Wheezing present.   Musculoskeletal:      Cervical back: Normal range of motion and neck supple.   Feet:      Comments:      Neurological:      Mental Status: She is alert.           ECG 12 Lead    Date/Time: 3/13/2025 2:40 PM  Performed by: Omer Diaz MD    Authorized by: Omer Diaz MD  Comparison: compared with previous ECG from 6/26/2023  Comparison to previous ECG: No change  Rhythm: sinus rhythm  Rate: normal  ST Segments: ST segments normal  T Waves: T waves normal  QRS axis: normal    Clinical impression: normal ECG  Comments: NSR,no acute sign of ischemia            Assessment/Plan:   Diagnoses and all orders for this visit:    1. Chronic bronchitis with pulmonary emphysema (Primary)  Assessment & Plan:  Patient is having a COPD exacerbation.  Her O2 sat is 95%.  She is breathing pretty quickly I think about 28 times per minute.  I wanted her to go to the ER if she refuses.  She knows this is AMA.  She is going to continue the doxycycline and Augmentin.  Her EKG is normal.  She is agreed that if she gets worse to go to emergency room.  I am going to give her a booster dose of 125 mg of Solu-Medrol.  She will get a chest x-ray.  I have discussed this with Caprice the pharmacist at the hospital.      Other orders  -     ECG 12 Lead             Follow Up:   No follow-ups on file.      Omer Diaz MD  [facility]

## 2025-03-15 ENCOUNTER — TELEPHONE (OUTPATIENT)
Dept: FAMILY MEDICINE CLINIC | Facility: CLINIC | Age: 80
End: 2025-03-15
Payer: MEDICARE

## 2025-03-15 NOTE — TELEPHONE ENCOUNTER
Patient called in wanting Dr Diaz to send her a script for medicine to put in her nebulizer, I advised her Dr Diaz wasn't here. She states she has pneumonia and Dr Diaz told her if she did not get any better to go to ER so I re-suggested that option and she told me I was no help and hung up.

## 2025-03-17 RX ORDER — IPRATROPIUM BROMIDE AND ALBUTEROL SULFATE 2.5; .5 MG/3ML; MG/3ML
3 SOLUTION RESPIRATORY (INHALATION) EVERY 4 HOURS PRN
Qty: 360 ML | Refills: 2 | Status: SHIPPED | OUTPATIENT
Start: 2025-03-17

## 2025-03-20 ENCOUNTER — OFFICE VISIT (OUTPATIENT)
Dept: FAMILY MEDICINE CLINIC | Facility: CLINIC | Age: 80
End: 2025-03-20
Payer: MEDICARE

## 2025-03-20 VITALS
BODY MASS INDEX: 25.76 KG/M2 | HEART RATE: 78 BPM | SYSTOLIC BLOOD PRESSURE: 120 MMHG | DIASTOLIC BLOOD PRESSURE: 82 MMHG | HEIGHT: 60 IN | RESPIRATION RATE: 16 BRPM | WEIGHT: 131.2 LBS | OXYGEN SATURATION: 98 %

## 2025-03-20 DIAGNOSIS — M25.551 BILATERAL HIP PAIN: ICD-10-CM

## 2025-03-20 DIAGNOSIS — M54.50 LOW BACK PAIN WITHOUT SCIATICA, UNSPECIFIED BACK PAIN LATERALITY, UNSPECIFIED CHRONICITY: ICD-10-CM

## 2025-03-20 DIAGNOSIS — E78.2 HYPERLIPIDEMIA, MIXED: ICD-10-CM

## 2025-03-20 DIAGNOSIS — J44.89 CHRONIC BRONCHITIS WITH PULMONARY EMPHYSEMA: Primary | ICD-10-CM

## 2025-03-20 DIAGNOSIS — M25.552 BILATERAL HIP PAIN: ICD-10-CM

## 2025-03-20 DIAGNOSIS — F32.A DEPRESSION DURING PREGNANCY, ANTEPARTUM: ICD-10-CM

## 2025-03-20 DIAGNOSIS — O99.340 DEPRESSION DURING PREGNANCY, ANTEPARTUM: ICD-10-CM

## 2025-03-20 DIAGNOSIS — R91.8 ABNORMAL CT LUNG SCREENING: ICD-10-CM

## 2025-03-20 PROCEDURE — 3079F DIAST BP 80-89 MM HG: CPT | Performed by: FAMILY MEDICINE

## 2025-03-20 PROCEDURE — 99214 OFFICE O/P EST MOD 30 MIN: CPT | Performed by: FAMILY MEDICINE

## 2025-03-20 PROCEDURE — 1126F AMNT PAIN NOTED NONE PRSNT: CPT | Performed by: FAMILY MEDICINE

## 2025-03-20 PROCEDURE — 3074F SYST BP LT 130 MM HG: CPT | Performed by: FAMILY MEDICINE

## 2025-03-20 RX ORDER — PREDNISONE 10 MG/1
10 TABLET ORAL DAILY
Qty: 30 TABLET | Refills: 0 | Status: SHIPPED | OUTPATIENT
Start: 2025-03-20

## 2025-03-20 NOTE — ASSESSMENT & PLAN NOTE
Patient thinks she is improved a little bit.  She still has cough and shortness of breath.  She is finished her antibiotics.  I am going to restart her prednisone.  I told her if she is not better by next Wednesday to contact us we will get her in to listen to her.  If she gets worse she is to go to emergency room.  If she does get better we can just follow-up with her regular routine appointments.

## 2025-03-20 NOTE — PROGRESS NOTES
Patient Name: Pilar Colon  : 1945   MRN: [id number]     Chief Complaint:    Chief Complaint   Patient presents with    Primary Care Follow-Up    Follow up Imaging        History of Present Illness: Pilar Colon is a 79 y.o. female who is here today for follow up on COPD exacerbation and lung nodules.  Primary Care Follow-UpAssociated symptoms include: shortness of breath, fatigue and cough.    patient doing about the same.       Review of Systems:   Review of Systems   Constitutional:  Positive for fatigue.   HENT: Negative.     Eyes: Negative.    Respiratory:  Positive for cough and shortness of breath.    Cardiovascular: Negative.    Gastrointestinal: Negative.    Neurological: Negative.         Past Medical History:   Past Medical History:   Diagnosis Date    Anemia     Anxiety     Arthritis     Asthma     Bronchitis     Chronic bronchitis with pulmonary emphysema 2015    COPD mixed type     Coronary arteriosclerosis in native artery 2010    Depression     Diverticulitis     Dupuytren contracture     Emphysema (subcutaneous) (surgical) resulting from a procedure     High risk medication use     History of degenerative disc disease     spine    History of left heart catheterization     STENT PLACED    Hx of adenomatous colonic polyps     Hyperlipidemia     Hypertension 2014    Low back pain     Mixed hyperlipidemia 2014    Osteoporosis     Paroxysmal A-fib     Peripheral polyneuropathy     Personal history of nicotine dependence     Pneumonia     Recurrent major depressive disorder in partial remission     Stress incontinence     Vertigo     Vitamin D deficiency        Past Surgical History:   Past Surgical History:   Procedure Laterality Date    ARTERY SURGERY      STENT LAD    BREAST BIOPSY Right     [place], Dr. Severo Kay    BREAST LUMPECTOMY      BREAST LUMPECTOMY Left     HAND SURGERY Left     Arthritis surgery, [place], [place]    SHOULDER SURGERY Bilateral      [place]. Dr. Dixon    SHOULDER SURGERY Bilateral     TUBAL ABDOMINAL LIGATION         Family History:   Family History   Problem Relation Age of Onset    Liver cancer Father     Diabetes Sister 53    Heart disease Sister 59        Undefined       Social History:   Social History     Socioeconomic History    Marital status: Single   Tobacco Use    Smoking status: Former     Current packs/day: 0.00     Average packs/day: 0.3 packs/day for 50.0 years (12.5 ttl pk-yrs)     Types: Cigarettes     Start date:      Quit date:      Years since quittin.2     Passive exposure: Past    Smokeless tobacco: Never    Tobacco comments:     Pt started smoking in  quit for 15 years and started back    Vaping Use    Vaping status: Never Used   Substance and Sexual Activity    Alcohol use: Yes     Comment: 1 Pint per week, SATURDAY NIGHT LAST DRINK     Drug use: Never    Sexual activity: Defer       Medications:     Current Outpatient Medications:     alendronate (FOSAMAX) 70 MG tablet, TAKE 1 TABLET BY MOUTH EVERY 7 DAYS, Disp: 12 tablet, Rfl: 1    amoxicillin-clavulanate (AUGMENTIN) 875-125 MG per tablet, Take 1 tablet by mouth 2 (Two) Times a Day., Disp: 20 tablet, Rfl: 0    baclofen (LIORESAL) 10 MG tablet, Take 1 tablet by mouth 2 (Two) Times a Day., Disp: , Rfl:     busPIRone (BUSPAR) 10 MG tablet, TAKE 1 TABLET BY MOUTH TWICE DAILY, Disp: 180 tablet, Rfl: 0    dilTIAZem (TIAZAC) 240 MG 24 hr capsule, TAKE 1 CAPSULE BY MOUTH DAILY, Disp: 90 capsule, Rfl: 1    doxycycline (ADOXA) 100 MG tablet, Take 1 tablet by mouth 2 (Two) Times a Day., Disp: 20 tablet, Rfl: 0    Fluticasone-Umeclidin-Vilant (Trelegy Ellipta) 100-62.5-25 MCG/ACT inhaler, INHALE 1 PUFF BY MOUTH DAILY, Disp: 60 each, Rfl: 3    furosemide (LASIX) 20 MG tablet, TAKE 1 TABLET BY MOUTH DAILY AS NEEDED FOR SWELLING, Disp: 30 tablet, Rfl: 0    gabapentin (NEURONTIN) 400 MG capsule, Take 1 capsule by mouth 3 (Three) Times a Day., Disp: , Rfl:      "HYDROcodone-acetaminophen (NORCO)  MG per tablet, TAKE 1 TABLET BY MOUTH EVERY 6 HOURS. DO NOT FILL ANY EARLIER THAN 30 DAYS, Disp: , Rfl:     ipratropium-albuterol (DUO-NEB) 0.5-2.5 mg/3 ml nebulizer, Take 3 mL by nebulization Every 4 (Four) Hours As Needed for Wheezing or Shortness of Air., Disp: 360 mL, Rfl: 2    losartan (COZAAR) 50 MG tablet, TAKE 1 TABLET BY MOUTH DAILY, Disp: 90 tablet, Rfl: 1    meclizine (ANTIVERT) 25 MG tablet, TAKE 1 TABLET BY MOUTH THREE TIMES DAILY AS NEEDED FOR DIZZINESS OR NAUSEA, Disp: 90 tablet, Rfl: 1    omeprazole (priLOSEC) 40 MG capsule, TAKE 1 CAPSULE BY MOUTH DAILY, Disp: 90 capsule, Rfl: 0    PARoxetine (PAXIL) 40 MG tablet, TAKE 1 TABLET BY MOUTH EVERY MORNING, Disp: 90 tablet, Rfl: 0    pravastatin (PRAVACHOL) 40 MG tablet, TAKE 1 TABLET BY MOUTH DAILY, Disp: 30 tablet, Rfl: 0    predniSONE (DELTASONE) 10 MG tablet, Take 1 tablet by mouth Daily. 5 po once a day for 2 days, 4 po once a day for 2 days, 3 po once a day for 2 days, 2 po once a day for 2 days, 1 po once a day for 2 days, Disp: 30 tablet, Rfl: 0    predniSONE (DELTASONE) 10 MG tablet, Take 1 tablet by mouth Daily. 5 po once a day for 2 days, 4 po once a day for 2 days, 3 po once a day for 2 days, 2 po once a day for 2 days, 1 po once a day for 2 days, Disp: 30 tablet, Rfl: 0    Current Facility-Administered Medications:     methylPREDNISolone sodium succinate (SOLU-Medrol) injection 125 mg, 125 mg, Intramuscular, Q6H, Omer Diaz MD, 125 mg at 03/13/25 1455    Allergies:   Allergies   Allergen Reactions    Codeine Rash    Tramadol Swelling and Nausea Only         Physical Exam:  Vital Signs:   Vitals:    03/20/25 1309   BP: 120/82   BP Location: Left arm   Patient Position: Sitting   Cuff Size: Adult   Pulse: 78   Resp: 16   SpO2: 98%   Weight: 59.5 kg (131 lb 3.2 oz)   Height: 152.4 cm (60\")   PainSc: 0-No pain     Body mass index is 25.62 kg/m².     Physical Exam  Vitals and nursing note reviewed. "   Constitutional:       Appearance: Normal appearance. She is normal weight.   HENT:      Head: Normocephalic and atraumatic.      Right Ear: Tympanic membrane, ear canal and external ear normal.      Left Ear: Tympanic membrane, ear canal and external ear normal.      Nose: Nose normal.      Mouth/Throat:      Mouth: Mucous membranes are dry.      Pharynx: Oropharynx is clear.   Eyes:      Extraocular Movements: Extraocular movements intact.      Conjunctiva/sclera: Conjunctivae normal.      Pupils: Pupils are equal, round, and reactive to light.   Cardiovascular:      Rate and Rhythm: Normal rate and regular rhythm.      Pulses: Normal pulses.      Heart sounds: Normal heart sounds.   Pulmonary:      Effort: Pulmonary effort is normal.      Breath sounds: Normal breath sounds.   Musculoskeletal:      Cervical back: Normal range of motion and neck supple.   Feet:      Comments:      Neurological:      Mental Status: She is alert.         Procedures      Assessment/Plan:   Diagnoses and all orders for this visit:    1. Chronic bronchitis with pulmonary emphysema (Primary)  Assessment & Plan:  Patient thinks she is improved a little bit.  She still has cough and shortness of breath.  She is finished her antibiotics.  I am going to restart her prednisone.  I told her if she is not better by next Wednesday to contact us we will get her in to listen to her.  If she gets worse she is to go to emergency room.  If she does get better we can just follow-up with her regular routine appointments.      2. Abnormal CT lung screening  Assessment & Plan:  Patient has appointment to see pulmonology in April.      3. Low back pain without sciatica, unspecified back pain laterality, unspecified chronicity  Assessment & Plan:  Viewed x-ray report.  Patient has arthritis in her spine.      4. Bilateral hip pain  Assessment & Plan:  She has arthritis in her hips.  Reviewed x-ray report      Other orders  -     predniSONE (DELTASONE) 10  MG tablet; Take 1 tablet by mouth Daily. 5 po once a day for 2 days, 4 po once a day for 2 days, 3 po once a day for 2 days, 2 po once a day for 2 days, 1 po once a day for 2 days  Dispense: 30 tablet; Refill: 0             Follow Up:   No follow-ups on file.      Omer Diaz MD  [facility]

## 2025-03-24 RX ORDER — PRAVASTATIN SODIUM 40 MG
40 TABLET ORAL DAILY
Qty: 90 TABLET | Refills: 1 | Status: SHIPPED | OUTPATIENT
Start: 2025-03-24

## 2025-03-24 RX ORDER — PAROXETINE 40 MG/1
40 TABLET, FILM COATED ORAL EVERY MORNING
Qty: 90 TABLET | Refills: 1 | Status: SHIPPED | OUTPATIENT
Start: 2025-03-24

## 2025-04-01 DIAGNOSIS — F32.A DEPRESSION DURING PREGNANCY, ANTEPARTUM: ICD-10-CM

## 2025-04-01 DIAGNOSIS — K21.9 GASTROESOPHAGEAL REFLUX DISEASE WITHOUT ESOPHAGITIS: ICD-10-CM

## 2025-04-01 DIAGNOSIS — O99.340 DEPRESSION DURING PREGNANCY, ANTEPARTUM: ICD-10-CM

## 2025-04-02 RX ORDER — BUSPIRONE HYDROCHLORIDE 10 MG/1
10 TABLET ORAL 2 TIMES DAILY
Qty: 180 TABLET | Refills: 0 | Status: SHIPPED | OUTPATIENT
Start: 2025-04-02

## 2025-04-02 RX ORDER — OMEPRAZOLE 40 MG/1
40 CAPSULE, DELAYED RELEASE ORAL DAILY
Qty: 90 CAPSULE | Refills: 0 | Status: SHIPPED | OUTPATIENT
Start: 2025-04-02

## 2025-04-07 DIAGNOSIS — R42 VERTIGO: ICD-10-CM

## 2025-04-08 RX ORDER — FUROSEMIDE 20 MG/1
20 TABLET ORAL DAILY PRN
Qty: 30 TABLET | Refills: 0 | Status: SHIPPED | OUTPATIENT
Start: 2025-04-08

## 2025-04-08 RX ORDER — MECLIZINE HYDROCHLORIDE 25 MG/1
TABLET ORAL
Qty: 90 TABLET | Refills: 0 | Status: SHIPPED | OUTPATIENT
Start: 2025-04-08

## 2025-04-15 ENCOUNTER — OFFICE VISIT (OUTPATIENT)
Dept: PULMONOLOGY | Facility: CLINIC | Age: 80
End: 2025-04-15
Payer: MEDICARE

## 2025-04-15 VITALS
WEIGHT: 136 LBS | TEMPERATURE: 97.5 F | SYSTOLIC BLOOD PRESSURE: 124 MMHG | HEART RATE: 70 BPM | OXYGEN SATURATION: 95 % | HEIGHT: 60 IN | DIASTOLIC BLOOD PRESSURE: 80 MMHG | BODY MASS INDEX: 26.7 KG/M2

## 2025-04-15 DIAGNOSIS — J43.9 PULMONARY EMPHYSEMA, UNSPECIFIED EMPHYSEMA TYPE: Primary | ICD-10-CM

## 2025-04-15 DIAGNOSIS — R91.8 LUNG NODULES: ICD-10-CM

## 2025-04-15 NOTE — PROGRESS NOTES
Initial Pulmonary Consult Note    Subjective   Reason for consultation/Chief Complaint: COPD    Pilar Colon is a 79 y.o. female is being seen for consultation today at the request of Omer Diaz MD    History of Present Illness    Ms. Colon is an 80yo F former smoker who is referred for COPD and pulmonary nodules.     She was treated with antibiotics and steroids x 2 in March. CT chest at the end of February showed new nodules.     She is currently using Trelegy 100 once daily. She cannot afford the $2000 Medicare payment and can no longer get her inhalers. She is using samples.     Active Ambulatory Problems     Diagnosis Date Noted    Anxiety disorder 10/07/2021    Depression 10/07/2021    Hyperlipidemia, mixed 10/07/2021    Essential hypertension, benign 10/07/2021    Low back pain without sciatica     Bilateral hip pain 08/16/2022    Pulmonary emphysema 08/16/2022    Coronary artery disease involving native coronary artery of native heart 08/18/2022    Paroxysmal atrial fibrillation 08/18/2022    Chronic bronchitis with pulmonary emphysema 01/23/2015    Dupuytren's contracture 09/20/2022    H/O: drug dependency 09/20/2022    High risk medication use 09/20/2022    Polyneuropathy 09/20/2022    Recurrent major depression in partial remission 09/20/2022    Vertigo 09/20/2022    Vitamin D deficiency 09/20/2022    Hyperglycemia 09/20/2022    Chronic kidney disease, stage 3a 09/20/2022    Adjustment disorder with mixed anxiety and depressed mood 09/20/2022    Dysuria 02/13/2023    Medicare annual wellness visit, subsequent 05/10/2023    Hereditary and idiopathic neuropathy, unspecified 05/10/2023    Intervertebral disc disorders with radiculopathy, thoracolumbar region 05/10/2023    Lumbosacral spondylosis without myelopathy 05/10/2023    Nicotine dependence 05/10/2023    Other long term (current) drug therapy 05/10/2023    Pain in thoracic spine 05/10/2023    Pathological fracture 05/10/2023    Spasm  05/10/2023    Thoracic radiculopathy 05/10/2023    Thoracic spondylosis without myelopathy 05/10/2023    Wedge compression fracture of unspecified thoracic vertebra, initial encounter for closed fracture 05/10/2023    Skin cancer 06/22/2023    Personal history of tobacco use, presenting hazards to health 07/16/2024    Other fatigue 12/31/2024    Cystitis 03/08/2025    Abnormal CT lung screening 03/08/2025     Resolved Ambulatory Problems     Diagnosis Date Noted    COPD with acute exacerbation 10/07/2021    Hypoxia 10/07/2021    COPD exacerbation 10/07/2021     Past Medical History:   Diagnosis Date    Anemia     Anxiety     Arthritis     Asthma     Bronchitis     COPD mixed type     Coronary arteriosclerosis in native artery 01/20/2010    Diverticulitis     Dupuytren contracture     Emphysema (subcutaneous) (surgical) resulting from a procedure     History of degenerative disc disease     History of left heart catheterization     Hx of adenomatous colonic polyps     Hyperlipidemia     Hypertension 12/31/2014    Low back pain     Mixed hyperlipidemia 12/31/2014    Osteoporosis     Paroxysmal A-fib     Peripheral polyneuropathy     Personal history of nicotine dependence     Pneumonia     Recurrent major depressive disorder in partial remission     Stress incontinence        Past Surgical History:   Procedure Laterality Date    ARTERY SURGERY  2000    STENT LAD    BREAST BIOPSY Right     [place], Dr. Seveor Kay    BREAST LUMPECTOMY      BREAST LUMPECTOMY Left     HAND SURGERY Left     Arthritis surgery, [place], [place]    SHOULDER SURGERY Bilateral     [place]. Dr. Dixon    SHOULDER SURGERY Bilateral     TUBAL ABDOMINAL LIGATION         Family History   Problem Relation Age of Onset    Liver cancer Father     Diabetes Sister 53    Heart disease Sister 59        Undefined       Social History     Socioeconomic History    Marital status: Single   Tobacco Use    Smoking status: Former     Current packs/day: 0.00      Average packs/day: 0.3 packs/day for 50.0 years (12.5 ttl pk-yrs)     Types: Cigarettes     Start date:      Quit date:      Years since quittin.2     Passive exposure: Past    Smokeless tobacco: Never    Tobacco comments:     Pt started smoking in  quit for 15 years and started back    Vaping Use    Vaping status: Never Used   Substance and Sexual Activity    Alcohol use: Yes     Comment: 1 Pint per week, SATURDAY NIGHT LAST DRINK     Drug use: Never    Sexual activity: Defer       Allergies   Allergen Reactions    Codeine Rash    Tramadol Swelling and Nausea Only       Current Outpatient Medications   Medication Sig Dispense Refill    alendronate (FOSAMAX) 70 MG tablet TAKE 1 TABLET BY MOUTH EVERY 7 DAYS 12 tablet 1    baclofen (LIORESAL) 10 MG tablet Take 1 tablet by mouth 2 (Two) Times a Day.      busPIRone (BUSPAR) 10 MG tablet TAKE 1 TABLET BY MOUTH TWICE DAILY 180 tablet 0    dilTIAZem (TIAZAC) 240 MG 24 hr capsule TAKE 1 CAPSULE BY MOUTH DAILY 90 capsule 1    Fluticasone-Umeclidin-Vilant (Trelegy Ellipta) 100-62.5-25 MCG/ACT inhaler INHALE 1 PUFF BY MOUTH DAILY 60 each 3    furosemide (LASIX) 20 MG tablet TAKE 1 TABLET BY MOUTH DAILY AS NEEDED FOR SWELLING 30 tablet 0    gabapentin (NEURONTIN) 400 MG capsule Take 1 capsule by mouth 3 (Three) Times a Day.      HYDROcodone-acetaminophen (NORCO)  MG per tablet TAKE 1 TABLET BY MOUTH EVERY 6 HOURS. DO NOT FILL ANY EARLIER THAN 30 DAYS      ipratropium-albuterol (DUO-NEB) 0.5-2.5 mg/3 ml nebulizer Take 3 mL by nebulization Every 4 (Four) Hours As Needed for Wheezing or Shortness of Air. 360 mL 2    losartan (COZAAR) 50 MG tablet TAKE 1 TABLET BY MOUTH DAILY 90 tablet 1    meclizine (ANTIVERT) 25 MG tablet TAKE 1 TABLET BY MOUTH THREE TIMES DAILY AS NEEDED FOR DIZZINESS OR NAUSEA 90 tablet 0    omeprazole (priLOSEC) 40 MG capsule TAKE 1 CAPSULE BY MOUTH DAILY 90 capsule 0    PARoxetine (PAXIL) 40 MG tablet TAKE 1 TABLET BY MOUTH EVERY  "MORNING 90 tablet 1    pravastatin (PRAVACHOL) 40 MG tablet TAKE 1 TABLET BY MOUTH DAILY 90 tablet 1     Current Facility-Administered Medications   Medication Dose Route Frequency Provider Last Rate Last Admin    methylPREDNISolone sodium succinate (SOLU-Medrol) injection 125 mg  125 mg Intramuscular Q6H Omer Diaz MD   125 mg at 03/13/25 1455       Review of Systems  All other systems were reviewed and are negative.  Exceptions are included in the HPI or as otherwise noted above.     Objective   Blood pressure 124/80, pulse 70, temperature 97.5 °F (36.4 °C), height 152.4 cm (60\"), weight 61.7 kg (136 lb), SpO2 95%, not currently breastfeeding.  Physical Exam  Vitals and nursing note reviewed.   Constitutional:       General: She is not in acute distress.     Appearance: She is well-developed.   HENT:      Head: Normocephalic and atraumatic.   Eyes:      General: No scleral icterus.     Conjunctiva/sclera: Conjunctivae normal.      Pupils: Pupils are equal, round, and reactive to light.   Neck:      Thyroid: No thyromegaly.      Trachea: No tracheal deviation.   Cardiovascular:      Rate and Rhythm: Normal rate and regular rhythm.      Heart sounds: Normal heart sounds.   Pulmonary:      Effort: Pulmonary effort is normal. No respiratory distress.      Breath sounds: Decreased breath sounds present. No wheezing.   Abdominal:      General: Bowel sounds are normal.      Palpations: Abdomen is soft.      Tenderness: There is no abdominal tenderness.   Musculoskeletal:         General: Normal range of motion.      Cervical back: Normal range of motion and neck supple.   Lymphadenopathy:      Cervical: No cervical adenopathy.   Skin:     General: Skin is warm and dry.      Findings: No erythema or rash.   Neurological:      Mental Status: She is alert and oriented to person, place, and time.      Motor: No abnormal muscle tone.      Coordination: Coordination normal.   Psychiatric:         Speech: Speech normal. "         Behavior: Behavior normal.         Judgment: Judgment normal.         PFTs:  Performed today and personally reviewed.   There is mild obstruction.  The lung volumes are normal.  The DLCO is reduced.     Imaging:  CT Chest from 2/26/25 reviewed. There eis a cluster of nodules in the superior segment of the right lower lung which are new as compared to October 2021, measuring 5mm. In the left lower lung, there is a groundglass nodule measuring 6mm which was present on the prior study and may represent scarring. There is a new vague groundglass opacity in the anterior left upper lung measuring [id number].     Assessment & Plan   Diagnoses and all orders for this visit:    1. Pulmonary emphysema, unspecified emphysema type (Primary)  -     Spirometry with Diffusion Capacity & Lung Volumes    2. Lung nodules  -     CT Chest Without Contrast; Future        Discussion:  Ms. Colon is an 80yo F former smoker who is referred for COPD and lung nodules.     1. COPD  - Stage 1 disease but she also has emphysema with reduced DLCO on imaging.   - Continue Trelegy 100. I have given her samples today in clinic.   - Albuterol as needed.   - Consider Azithromycin 500mg MWF if she continues to have exacerbations requiring antibiotics and steroids.    2. Pulmonary Nodules  - There is a new cluster of up to 5mm nodule in the right lower lobe and a new groundglass opacity measuring 13 x 11mm on CT chest from 2/26/25.   - We will plan to get a repeat CT chest in August for 6 month follow up.     Follow up in August after repeat CT Chest.        Pilar Colon  reports that she quit smoking about 15 months ago. Her smoking use included cigarettes. She started smoking about 51 years ago. She has a 12.5 pack-year smoking history. She has been exposed to tobacco smoke. She has never used smokeless tobacco.     I have spent 61 minutes reviewing the patient record, face to face with the patient in discussion of test results and plans for  further management and in documentation and coordination of care. Excluding time spent on other separate services such as performing procedures or test interpretation, if applicable.        Jory V Case, DO  Pulmonary and Critical Care Medicine  Note Electronically Signed

## 2025-05-23 ENCOUNTER — TELEPHONE (OUTPATIENT)
Dept: FAMILY MEDICINE CLINIC | Facility: CLINIC | Age: 80
End: 2025-05-23
Payer: MEDICARE

## 2025-05-23 NOTE — TELEPHONE ENCOUNTER
Caller: SOY ACEVES    Relationship:SELF    Callback number: [phone]   Is it ok to leave a message: [x] Yes [] No    Requested medication for samples: TRELEGY INHALER    How much medication does the patient currently have left: A FEW PUFFS LEFT    Who will be picking up the samples: MAX HERNANDEZ FRIEND    Do you need information about patient financial assistance for this medication: [] Yes [x] No    Additional details provided: PATIENT STATES TO PLEASE CALL TO LET HER KNOW ASAP

## 2025-05-27 ENCOUNTER — OFFICE VISIT (OUTPATIENT)
Dept: FAMILY MEDICINE CLINIC | Facility: CLINIC | Age: 80
End: 2025-05-27
Payer: MEDICARE

## 2025-05-27 ENCOUNTER — TELEPHONE (OUTPATIENT)
Dept: FAMILY MEDICINE CLINIC | Facility: CLINIC | Age: 80
End: 2025-05-27

## 2025-05-27 ENCOUNTER — PATIENT ROUNDING (BHMG ONLY) (OUTPATIENT)
Dept: FAMILY MEDICINE CLINIC | Facility: CLINIC | Age: 80
End: 2025-05-27
Payer: MEDICARE

## 2025-05-27 VITALS
DIASTOLIC BLOOD PRESSURE: 84 MMHG | BODY MASS INDEX: 26.56 KG/M2 | SYSTOLIC BLOOD PRESSURE: 124 MMHG | HEART RATE: 82 BPM | WEIGHT: 135.3 LBS | HEIGHT: 60 IN | OXYGEN SATURATION: 100 %

## 2025-05-27 DIAGNOSIS — I25.10 CORONARY ARTERY DISEASE INVOLVING NATIVE CORONARY ARTERY OF NATIVE HEART WITHOUT ANGINA PECTORIS: ICD-10-CM

## 2025-05-27 DIAGNOSIS — E55.9 VITAMIN D DEFICIENCY: ICD-10-CM

## 2025-05-27 DIAGNOSIS — I10 ESSENTIAL HYPERTENSION, BENIGN: ICD-10-CM

## 2025-05-27 DIAGNOSIS — E78.2 HYPERLIPIDEMIA, MIXED: ICD-10-CM

## 2025-05-27 DIAGNOSIS — Q83.9 BREAST ANOMALY: Primary | ICD-10-CM

## 2025-05-27 DIAGNOSIS — N18.31 CHRONIC KIDNEY DISEASE, STAGE 3A: ICD-10-CM

## 2025-05-27 DIAGNOSIS — C44.90 SKIN CANCER: ICD-10-CM

## 2025-05-27 DIAGNOSIS — N63.11 UNSPECIFIED LUMP IN THE RIGHT BREAST, UPPER OUTER QUADRANT: ICD-10-CM

## 2025-05-27 DIAGNOSIS — R73.9 HYPERGLYCEMIA: ICD-10-CM

## 2025-05-27 PROCEDURE — G2211 COMPLEX E/M VISIT ADD ON: HCPCS | Performed by: FAMILY MEDICINE

## 2025-05-27 PROCEDURE — 3074F SYST BP LT 130 MM HG: CPT | Performed by: FAMILY MEDICINE

## 2025-05-27 PROCEDURE — 99214 OFFICE O/P EST MOD 30 MIN: CPT | Performed by: FAMILY MEDICINE

## 2025-05-27 PROCEDURE — 3079F DIAST BP 80-89 MM HG: CPT | Performed by: FAMILY MEDICINE

## 2025-05-27 PROCEDURE — 1125F AMNT PAIN NOTED PAIN PRSNT: CPT | Performed by: FAMILY MEDICINE

## 2025-05-27 NOTE — PROGRESS NOTES
Patient Name: Pilar Colon  : 1945   MRN: [id number]     Chief Complaint: Patient states her back hurts  Chief Complaint   Patient presents with    LT Side Lung Pain       History of Present Illness: Pilar Colon is a 79 y.o. female who is here today for follow up.On back pain, abnormal CT, and BP  HPI        Review of Systems:   Review of Systems   Constitutional: Negative.    HENT: Negative.     Eyes: Negative.    Respiratory: Negative.     Cardiovascular: Negative.    Gastrointestinal: Negative.    Neurological: Negative.         Past Medical History:   Past Medical History:   Diagnosis Date    Anemia     Anxiety     Arthritis     Asthma     Bronchitis     Chronic bronchitis with pulmonary emphysema 2015    COPD mixed type     Coronary arteriosclerosis in native artery 2010    Depression     Diverticulitis     Dupuytren contracture     Emphysema (subcutaneous) (surgical) resulting from a procedure     High risk medication use     History of degenerative disc disease     spine    History of left heart catheterization     STENT PLACED    Hx of adenomatous colonic polyps     Hyperlipidemia     Hypertension 2014    Low back pain     Mixed hyperlipidemia 2014    Osteoporosis     Paroxysmal A-fib     Peripheral polyneuropathy     Personal history of nicotine dependence     Pneumonia     Recurrent major depressive disorder in partial remission     Stress incontinence     Vertigo     Vitamin D deficiency        Past Surgical History:   Past Surgical History:   Procedure Laterality Date    ARTERY SURGERY      STENT LAD    BREAST BIOPSY Right     [place], Dr. Severo Kay    BREAST LUMPECTOMY      BREAST LUMPECTOMY Left     HAND SURGERY Left     Arthritis surgery, [place], [place]    SHOULDER SURGERY Bilateral     [place]. Dr. Dixon    SHOULDER SURGERY Bilateral     TUBAL ABDOMINAL LIGATION         Family History:   Family History   Problem Relation Age of Onset    Liver  cancer Father     Diabetes Sister 53    Heart disease Sister 59        Undefined       Social History:   Social History     Socioeconomic History    Marital status: Single   Tobacco Use    Smoking status: Former     Current packs/day: 0.00     Average packs/day: 0.3 packs/day for 50.0 years (12.5 ttl pk-yrs)     Types: Cigarettes     Start date:      Quit date:      Years since quittin.4     Passive exposure: Past    Smokeless tobacco: Never    Tobacco comments:     Pt started smoking in  quit for 15 years and started back    Vaping Use    Vaping status: Never Used   Substance and Sexual Activity    Alcohol use: Yes     Comment: 1 Pint per week, SATURDAY NIGHT LAST DRINK     Drug use: Never    Sexual activity: Defer       Medications:     Current Outpatient Medications:     alendronate (FOSAMAX) 70 MG tablet, TAKE 1 TABLET BY MOUTH EVERY 7 DAYS, Disp: 12 tablet, Rfl: 1    baclofen (LIORESAL) 10 MG tablet, Take 1 tablet by mouth 2 (Two) Times a Day., Disp: , Rfl:     busPIRone (BUSPAR) 10 MG tablet, TAKE 1 TABLET BY MOUTH TWICE DAILY, Disp: 180 tablet, Rfl: 0    dilTIAZem (TIAZAC) 240 MG 24 hr capsule, TAKE 1 CAPSULE BY MOUTH DAILY, Disp: 90 capsule, Rfl: 1    Fluticasone-Umeclidin-Vilant (Trelegy Ellipta) 100-62.5-25 MCG/ACT inhaler, INHALE 1 PUFF BY MOUTH DAILY, Disp: 60 each, Rfl: 3    furosemide (LASIX) 20 MG tablet, TAKE 1 TABLET BY MOUTH DAILY AS NEEDED FOR SWELLING, Disp: 30 tablet, Rfl: 0    gabapentin (NEURONTIN) 400 MG capsule, Take 1 capsule by mouth 3 (Three) Times a Day., Disp: , Rfl:     HYDROcodone-acetaminophen (NORCO)  MG per tablet, TAKE 1 TABLET BY MOUTH EVERY 6 HOURS. DO NOT FILL ANY EARLIER THAN 30 DAYS, Disp: , Rfl:     ipratropium-albuterol (DUO-NEB) 0.5-2.5 mg/3 ml nebulizer, Take 3 mL by nebulization Every 4 (Four) Hours As Needed for Wheezing or Shortness of Air., Disp: 360 mL, Rfl: 2    losartan (COZAAR) 50 MG tablet, TAKE 1 TABLET BY MOUTH DAILY, Disp: 90 tablet,  "Rfl: 1    meclizine (ANTIVERT) 25 MG tablet, TAKE 1 TABLET BY MOUTH THREE TIMES DAILY AS NEEDED FOR DIZZINESS OR NAUSEA, Disp: 90 tablet, Rfl: 0    omeprazole (priLOSEC) 40 MG capsule, TAKE 1 CAPSULE BY MOUTH DAILY, Disp: 90 capsule, Rfl: 0    PARoxetine (PAXIL) 40 MG tablet, TAKE 1 TABLET BY MOUTH EVERY MORNING, Disp: 90 tablet, Rfl: 1    pravastatin (PRAVACHOL) 40 MG tablet, TAKE 1 TABLET BY MOUTH DAILY, Disp: 90 tablet, Rfl: 1    Current Facility-Administered Medications:     methylPREDNISolone sodium succinate (SOLU-Medrol) injection 125 mg, 125 mg, Intramuscular, Q6H, Omer Diaz MD, 125 mg at 03/13/25 1455    Allergies:   Allergies   Allergen Reactions    Codeine Rash    Tramadol Swelling and Nausea Only         Physical Exam:  Vital Signs:   Vitals:    05/27/25 1204   BP: 124/84   BP Location: Left arm   Patient Position: Sitting   Cuff Size: Adult   Pulse: 82   SpO2: 100%   Weight: 61.4 kg (135 lb 4.8 oz)   Height: 152.4 cm (60\")   PainSc: 4    PainLoc: Back     Body mass index is 26.42 kg/m².     Physical Exam  Vitals and nursing note reviewed.   Constitutional:       Appearance: Normal appearance. She is normal weight.   HENT:      Head: Normocephalic and atraumatic.      Right Ear: Tympanic membrane, ear canal and external ear normal.      Left Ear: Tympanic membrane, ear canal and external ear normal.      Nose: Nose normal.      Mouth/Throat:      Mouth: Mucous membranes are dry.      Pharynx: Oropharynx is clear.   Eyes:      Extraocular Movements: Extraocular movements intact.      Conjunctiva/sclera: Conjunctivae normal.      Pupils: Pupils are equal, round, and reactive to light.   Cardiovascular:      Rate and Rhythm: Normal rate and regular rhythm.      Pulses: Normal pulses.      Heart sounds: Normal heart sounds.   Pulmonary:      Effort: Pulmonary effort is normal.      Breath sounds: Normal breath sounds.   Musculoskeletal:      Cervical back: Normal range of motion and neck supple. "   Feet:      Comments:      Neurological:      Mental Status: She is alert.         Procedures      Assessment/Plan:   Diagnoses and all orders for this visit:    1. Breast anomaly (Primary)  Assessment & Plan:  Patient had a stable nodule in her right breast.  I am going to get a diagnostic mammogram.      2. Coronary artery disease involving native coronary artery of native heart without angina pectoris  Assessment & Plan:  Aggressive risk factor modification.      3. Essential hypertension, benign  Assessment & Plan:  Discussed with patient to monitor their blood pressure and if systolic blood pressure goes above 140 or diastolic is above 90 to return to clinic.  Take medicines as directed, call for any problems, patient not having or any worrisome symptoms.          4. Hyperlipidemia, mixed  Assessment & Plan:  Blood work in 5 weeks      5. Hyperglycemia  Assessment & Plan:  Blood work and 5 weeks      6. Vitamin D deficiency  Assessment & Plan:  Blood work in for 5 weeks      7. Chronic kidney disease, stage 3a  Assessment & Plan:  Patient is instructed to not take any NSAIDs.  Medicines as directed.  Stay well-hydrated.        8. Skin cancer  Assessment & Plan:  Patient has what appears to be a skin cancer left cheek.  I asked her to go see a dermatologist at this she refuses.  She knows it is only get worse.  She knows this is AMA               Follow Up:   Return in about 6 weeks (around 7/8/2025) for Annual physical, Bloodwork 1 week prior to next appointment.      Omer Diaz MD  [facility]

## 2025-05-27 NOTE — ASSESSMENT & PLAN NOTE
Patient has what appears to be a skin cancer left cheek.  I asked her to go see a dermatologist at this she refuses.  She knows it is only get worse.  She knows this is AMA

## 2025-05-29 NOTE — PROGRESS NOTES
May 29, 2025    Hello, may I speak with Pilar Colon?    My name is SELINA GREENBERG       I am  with [facility]  [address] [facility] [address] [phone].    Before we get started may I verify your date of birth? 1945    I am calling to officially welcome you to our practice and ask about your recent visit. Is this a good time to talk? yes    Tell me about your visit with us. What things went well?  DR. DAI IS GREAT. HE ALWAYS TAKES GOOD CARE OF ME. I LOVE HIS GIRLS UPFRONT. THEY ARE SO NICE AND PATIENT. ALWAYS SMILING. THEY MAKE ME FEEL WELCOMED. DR. DAI NURSE IS WONDERFUL. SHE IS SO CARING. SHE REALLY DOES ALL SHE CAN FOR THE PATIENTS.        We're always looking for ways to make our patients' experiences even better. Do you have recommendations on ways we may improve?  no    Overall were you satisfied with your first visit to our practice? yes       I appreciate you taking the time to speak with me today. Is there anything else I can do for you? no      Thank you, and have a great day.

## 2025-06-05 ENCOUNTER — TELEPHONE (OUTPATIENT)
Dept: FAMILY MEDICINE CLINIC | Facility: CLINIC | Age: 80
End: 2025-06-05

## 2025-06-05 NOTE — TELEPHONE ENCOUNTER
Fluticasone-Umeclidin-Vilant (Trelegy Ellipta) 100-62.5-25 MCG/ACT inhaler       PATIENT WANTS A SAMPLE IF WE HAVE.. EXPLAINED DR DAI IS OUT WILL NEED TO SEE IF ANOTHER PROVIDER CAN SIGN OFF ON AND CALL HER BACK

## 2025-06-06 DIAGNOSIS — K21.9 GASTROESOPHAGEAL REFLUX DISEASE WITHOUT ESOPHAGITIS: ICD-10-CM

## 2025-06-06 DIAGNOSIS — R42 VERTIGO: ICD-10-CM

## 2025-06-06 RX ORDER — MECLIZINE HYDROCHLORIDE 25 MG/1
TABLET ORAL
Qty: 90 TABLET | Refills: 0 | Status: SHIPPED | OUTPATIENT
Start: 2025-06-06

## 2025-06-06 RX ORDER — OMEPRAZOLE 40 MG/1
40 CAPSULE, DELAYED RELEASE ORAL DAILY
Qty: 90 CAPSULE | Refills: 0 | Status: SHIPPED | OUTPATIENT
Start: 2025-06-06

## 2025-06-10 ENCOUNTER — TELEPHONE (OUTPATIENT)
Dept: FAMILY MEDICINE CLINIC | Facility: CLINIC | Age: 80
End: 2025-06-10

## 2025-06-10 NOTE — TELEPHONE ENCOUNTER
Caller: Pilar Colon    Relationship: Self    Best call back number: [phone]     What medication are you requesting: AN ANTIBIOTIC    What are your current symptoms: PERSISTENT COUGH    How long have you been experiencing symptoms: A FEW WEEKS    If a prescription is needed, what is your preferred pharmacy and phone number: [id number] - [place] - [address] AT [facility] [address] - [phone]  - [phone]      Additional notes: PATIENT CALLED TO ASK IF DR DAI COULD CALL IN AN ANTIBIOTIC FOR HER TO HELP WITH A PERSISTENT COUGH SHE HAS HAD FOR WEEKS    PLEASE ADVISE

## 2025-06-11 ENCOUNTER — TELEPHONE (OUTPATIENT)
Dept: FAMILY MEDICINE CLINIC | Facility: CLINIC | Age: 80
End: 2025-06-11
Payer: MEDICARE

## 2025-06-11 NOTE — TELEPHONE ENCOUNTER
CALLED PT AND INFORMED HER WE ARE NOT ABLE TO PRESCRIBE ANY MEDICATIONS WITHOUT BEING SEEN, ADVISED PT SHE CAN WALK IN FOR URGENT CARE

## 2025-06-25 ENCOUNTER — TELEPHONE (OUTPATIENT)
Dept: FAMILY MEDICINE CLINIC | Facility: CLINIC | Age: 80
End: 2025-06-25
Payer: MEDICARE

## 2025-06-25 DIAGNOSIS — J44.1 COPD WITH EXACERBATION: ICD-10-CM

## 2025-06-25 NOTE — TELEPHONE ENCOUNTER
Caller: SOY ACEVES    Relationship:PATIENT    Callback number:     [phone]      Is it ok to leave a message: [x] Yes [] No    Requested medication for samples: TRELEGY    How much medication does the patient currently have left: NONE    Who will be picking up the samples: WESTLEY ALANIZ HER SON, OR THE PATIENT HERSELF

## 2025-07-01 ENCOUNTER — LAB (OUTPATIENT)
Dept: FAMILY MEDICINE CLINIC | Facility: CLINIC | Age: 80
End: 2025-07-01
Payer: MEDICARE

## 2025-07-01 DIAGNOSIS — E78.2 HYPERLIPIDEMIA, MIXED: ICD-10-CM

## 2025-07-01 DIAGNOSIS — N18.31 CHRONIC KIDNEY DISEASE, STAGE 3A: ICD-10-CM

## 2025-07-01 DIAGNOSIS — R53.83 OTHER FATIGUE: ICD-10-CM

## 2025-07-01 DIAGNOSIS — J44.89 CHRONIC BRONCHITIS WITH PULMONARY EMPHYSEMA: ICD-10-CM

## 2025-07-01 DIAGNOSIS — E55.9 VITAMIN D DEFICIENCY: ICD-10-CM

## 2025-07-01 DIAGNOSIS — I10 ESSENTIAL HYPERTENSION, BENIGN: ICD-10-CM

## 2025-07-01 DIAGNOSIS — I25.118 CORONARY ARTERY DISEASE OF NATIVE ARTERY OF NATIVE HEART WITH STABLE ANGINA PECTORIS: ICD-10-CM

## 2025-07-01 DIAGNOSIS — R25.2 SPASM: ICD-10-CM

## 2025-07-01 DIAGNOSIS — R73.9 HYPERGLYCEMIA: ICD-10-CM

## 2025-07-02 LAB
25(OH)D3+25(OH)D2 SERPL-MCNC: 28.9 NG/ML (ref 30–100)
ALBUMIN SERPL-MCNC: 4.2 G/DL (ref 3.8–4.8)
ALP SERPL-CCNC: 74 IU/L (ref 44–121)
ALT SERPL-CCNC: 8 IU/L (ref 0–32)
AST SERPL-CCNC: 17 IU/L (ref 0–40)
BASOPHILS # BLD AUTO: 0.1 X10E3/UL (ref 0–0.2)
BASOPHILS NFR BLD AUTO: 1 %
BILIRUB SERPL-MCNC: 0.2 MG/DL (ref 0–1.2)
BUN SERPL-MCNC: 15 MG/DL (ref 8–27)
BUN/CREAT SERPL: 18 (ref 12–28)
CALCIUM SERPL-MCNC: 9.3 MG/DL (ref 8.7–10.3)
CHLORIDE SERPL-SCNC: 106 MMOL/L (ref 96–106)
CHOLEST SERPL-MCNC: 225 MG/DL (ref 100–199)
CO2 SERPL-SCNC: 17 MMOL/L (ref 20–29)
CREAT SERPL-MCNC: 0.85 MG/DL (ref 0.57–1)
EGFRCR SERPLBLD CKD-EPI 2021: 69 ML/MIN/1.73
EOSINOPHIL # BLD AUTO: 0.3 X10E3/UL (ref 0–0.4)
EOSINOPHIL NFR BLD AUTO: 4 %
ERYTHROCYTE [DISTWIDTH] IN BLOOD BY AUTOMATED COUNT: 13.5 % (ref 11.7–15.4)
GLOBULIN SER CALC-MCNC: 2.3 G/DL (ref 1.5–4.5)
GLUCOSE SERPL-MCNC: 96 MG/DL (ref 70–99)
HBA1C MFR BLD: 6.1 % (ref 4.8–5.6)
HCT VFR BLD AUTO: 39 % (ref 34–46.6)
HDLC SERPL-MCNC: 60 MG/DL
HGB BLD-MCNC: 12.3 G/DL (ref 11.1–15.9)
IMM GRANULOCYTES # BLD AUTO: 0 X10E3/UL (ref 0–0.1)
IMM GRANULOCYTES NFR BLD AUTO: 0 %
LDLC SERPL CALC-MCNC: 124 MG/DL (ref 0–99)
LYMPHOCYTES # BLD AUTO: 2.7 X10E3/UL (ref 0.7–3.1)
LYMPHOCYTES NFR BLD AUTO: 36 %
MCH RBC QN AUTO: 28.3 PG (ref 26.6–33)
MCHC RBC AUTO-ENTMCNC: 31.5 G/DL (ref 31.5–35.7)
MCV RBC AUTO: 90 FL (ref 79–97)
MONOCYTES # BLD AUTO: 0.7 X10E3/UL (ref 0.1–0.9)
MONOCYTES NFR BLD AUTO: 9 %
NEUTROPHILS # BLD AUTO: 3.7 X10E3/UL (ref 1.4–7)
NEUTROPHILS NFR BLD AUTO: 50 %
PLATELET # BLD AUTO: 350 X10E3/UL (ref 150–450)
POTASSIUM SERPL-SCNC: 4.5 MMOL/L (ref 3.5–5.2)
PROT SERPL-MCNC: 6.5 G/DL (ref 6–8.5)
RBC # BLD AUTO: 4.34 X10E6/UL (ref 3.77–5.28)
SODIUM SERPL-SCNC: 142 MMOL/L (ref 134–144)
TRIGL SERPL-MCNC: 237 MG/DL (ref 0–149)
TSH SERPL DL<=0.005 MIU/L-ACNC: 1.47 UIU/ML (ref 0.45–4.5)
VIT B12 SERPL-MCNC: 333 PG/ML (ref 232–1245)
VLDLC SERPL CALC-MCNC: 41 MG/DL (ref 5–40)
WBC # BLD AUTO: 7.5 X10E3/UL (ref 3.4–10.8)

## 2025-07-06 DIAGNOSIS — F32.A DEPRESSION DURING PREGNANCY, ANTEPARTUM: ICD-10-CM

## 2025-07-06 DIAGNOSIS — O99.340 DEPRESSION DURING PREGNANCY, ANTEPARTUM: ICD-10-CM

## 2025-07-07 RX ORDER — BUSPIRONE HYDROCHLORIDE 10 MG/1
10 TABLET ORAL 2 TIMES DAILY
Qty: 180 TABLET | Refills: 1 | Status: SHIPPED | OUTPATIENT
Start: 2025-07-07

## 2025-07-08 ENCOUNTER — OFFICE VISIT (OUTPATIENT)
Dept: FAMILY MEDICINE CLINIC | Facility: CLINIC | Age: 80
End: 2025-07-08
Payer: MEDICARE

## 2025-07-08 VITALS
WEIGHT: 131.4 LBS | HEIGHT: 60 IN | SYSTOLIC BLOOD PRESSURE: 142 MMHG | BODY MASS INDEX: 25.8 KG/M2 | HEART RATE: 68 BPM | OXYGEN SATURATION: 98 % | DIASTOLIC BLOOD PRESSURE: 82 MMHG

## 2025-07-08 DIAGNOSIS — I25.10 CORONARY ARTERY DISEASE INVOLVING NATIVE CORONARY ARTERY OF NATIVE HEART WITHOUT ANGINA PECTORIS: ICD-10-CM

## 2025-07-08 DIAGNOSIS — Z00.00 PHYSICAL EXAM: ICD-10-CM

## 2025-07-08 DIAGNOSIS — E55.9 VITAMIN D DEFICIENCY: ICD-10-CM

## 2025-07-08 DIAGNOSIS — E78.2 HYPERLIPIDEMIA, MIXED: ICD-10-CM

## 2025-07-08 DIAGNOSIS — N18.31 CHRONIC KIDNEY DISEASE, STAGE 3A: ICD-10-CM

## 2025-07-08 DIAGNOSIS — J43.9 PULMONARY EMPHYSEMA, UNSPECIFIED EMPHYSEMA TYPE: ICD-10-CM

## 2025-07-08 DIAGNOSIS — I10 ESSENTIAL HYPERTENSION, BENIGN: ICD-10-CM

## 2025-07-08 DIAGNOSIS — R91.8 ABNORMAL CT LUNG SCREENING: ICD-10-CM

## 2025-07-08 DIAGNOSIS — R73.9 HYPERGLYCEMIA: ICD-10-CM

## 2025-07-08 DIAGNOSIS — Z00.00 MEDICARE ANNUAL WELLNESS VISIT, SUBSEQUENT: Primary | ICD-10-CM

## 2025-07-08 PROCEDURE — 99397 PER PM REEVAL EST PAT 65+ YR: CPT | Performed by: FAMILY MEDICINE

## 2025-07-08 PROCEDURE — 3077F SYST BP >= 140 MM HG: CPT | Performed by: FAMILY MEDICINE

## 2025-07-08 PROCEDURE — 99214 OFFICE O/P EST MOD 30 MIN: CPT | Performed by: FAMILY MEDICINE

## 2025-07-08 PROCEDURE — G0439 PPPS, SUBSEQ VISIT: HCPCS | Performed by: FAMILY MEDICINE

## 2025-07-08 PROCEDURE — 96160 PT-FOCUSED HLTH RISK ASSMT: CPT | Performed by: FAMILY MEDICINE

## 2025-07-08 PROCEDURE — 1170F FXNL STATUS ASSESSED: CPT | Performed by: FAMILY MEDICINE

## 2025-07-08 PROCEDURE — 3079F DIAST BP 80-89 MM HG: CPT | Performed by: FAMILY MEDICINE

## 2025-07-08 PROCEDURE — G2211 COMPLEX E/M VISIT ADD ON: HCPCS | Performed by: FAMILY MEDICINE

## 2025-07-08 PROCEDURE — 1126F AMNT PAIN NOTED NONE PRSNT: CPT | Performed by: FAMILY MEDICINE

## 2025-07-08 RX ORDER — PRAVASTATIN SODIUM 80 MG/1
80 TABLET ORAL DAILY
Qty: 90 TABLET | Refills: 1 | Status: SHIPPED | OUTPATIENT
Start: 2025-07-08

## 2025-07-08 NOTE — PROGRESS NOTES
Subjective   The ABCs of the Annual Wellness Visit  Medicare Wellness Visit      Pilar Colon is a 79 y.o. patient who presents for a Medicare Wellness Visit.  Blood sugar, vitamin D, breathing    The following portions of the patient's history were reviewed and   updated as appropriate: allergies, current medications, past family history, past medical history, past social history, past surgical history, and problem list.    Compared to one year ago, the patient's physical   health is worse.  Compared to one year ago, the patient's mental   health is the same.    Recent Hospitalizations:  She was not admitted to the hospital during the last year.     Current Medical Providers:  Patient Care Team:  Omer Diaz MD as PCP - General (Family Medicine)  Vick Lou MD as Consulting Physician (Anesthesiology)  Rubin Leija MD as Consulting Physician (Cardiology)  Vick Stallworth MD (Pain Medicine)    Outpatient Medications Prior to Visit   Medication Sig Dispense Refill    alendronate (FOSAMAX) 70 MG tablet TAKE 1 TABLET BY MOUTH EVERY 7 DAYS 12 tablet 1    baclofen (LIORESAL) 10 MG tablet Take 1 tablet by mouth 2 (Two) Times a Day.      busPIRone (BUSPAR) 10 MG tablet TAKE 1 TABLET BY MOUTH TWICE DAILY 180 tablet 1    dilTIAZem (TIAZAC) 240 MG 24 hr capsule TAKE 1 CAPSULE BY MOUTH DAILY 90 capsule 1    Fluticasone-Umeclidin-Vilant (Trelegy Ellipta) 100-62.5-25 MCG/ACT inhaler INHALE 1 PUFF BY MOUTH DAILY 60 each 3    furosemide (LASIX) 20 MG tablet TAKE 1 TABLET BY MOUTH DAILY AS NEEDED FOR SWELLING 30 tablet 0    gabapentin (NEURONTIN) 400 MG capsule Take 1 capsule by mouth 3 (Three) Times a Day.      HYDROcodone-acetaminophen (NORCO)  MG per tablet TAKE 1 TABLET BY MOUTH EVERY 6 HOURS. DO NOT FILL ANY EARLIER THAN 30 DAYS      ipratropium-albuterol (DUO-NEB) 0.5-2.5 mg/3 ml nebulizer Take 3 mL by nebulization Every 4 (Four) Hours As Needed for Wheezing or Shortness of Air. 360 mL 2     losartan (COZAAR) 50 MG tablet TAKE 1 TABLET BY MOUTH DAILY 90 tablet 1    meclizine (ANTIVERT) 25 MG tablet TAKE 1 TABLET BY MOUTH THREE TIMES DAILY AS NEEDED FOR DIZZINESS OR NAUSEA 90 tablet 0    omeprazole (priLOSEC) 40 MG capsule TAKE 1 CAPSULE BY MOUTH DAILY 90 capsule 0    PARoxetine (PAXIL) 40 MG tablet TAKE 1 TABLET BY MOUTH EVERY MORNING 90 tablet 1    pravastatin (PRAVACHOL) 40 MG tablet TAKE 1 TABLET BY MOUTH DAILY 90 tablet 1     Facility-Administered Medications Prior to Visit   Medication Dose Route Frequency Provider Last Rate Last Admin    methylPREDNISolone sodium succinate (SOLU-Medrol) injection 125 mg  125 mg Intramuscular Q6H Omer Diaz MD   125 mg at 03/13/25 1455     Opioid medication/s are on active medication list.  and I have evaluated her active treatment plan and pain score trends (see table).  Vitals:    07/08/25 1057   PainSc: 0-No pain     I have reviewed the chart for potential of high risk medication and harmful drug interactions in the elderly.        Aspirin is not on active medication list.  Aspirin use is indicated based on review of current medical condition/s. Pros and cons of this therapy have been discussed with this patient. Benefits of this medication outweigh potential harm.  Patient has been instructed to start taking this medication..    Patient Active Problem List   Diagnosis    Anxiety disorder    Depression    Hyperlipidemia, mixed    Essential hypertension, benign    Low back pain without sciatica    Bilateral hip pain    Pulmonary emphysema    Coronary artery disease involving native coronary artery of native heart    Paroxysmal atrial fibrillation    Chronic bronchitis with pulmonary emphysema    Dupuytren's contracture    H/O: drug dependency    High risk medication use    Polyneuropathy    Recurrent major depression in partial remission    Vertigo    Vitamin D deficiency    Hyperglycemia    Chronic kidney disease, stage 3a    Adjustment disorder with  "mixed anxiety and depressed mood    Dysuria    Medicare annual wellness visit, subsequent    Hereditary and idiopathic neuropathy, unspecified    Intervertebral disc disorders with radiculopathy, thoracolumbar region    Lumbosacral spondylosis without myelopathy    Nicotine dependence    Other long term (current) drug therapy    Pain in thoracic spine    Pathological fracture    Spasm    Thoracic radiculopathy    Thoracic spondylosis without myelopathy    Wedge compression fracture of unspecified thoracic vertebra, initial encounter for closed fracture    Skin cancer    Personal history of tobacco use, presenting hazards to health    Other fatigue    Cystitis    Abnormal CT lung screening    Breast anomaly     Advance Care Planning Advance Directive is not on file.  ACP discussion was held with the patient during this visit. Patient does not have an advance directive, information provided.            Objective   Vitals:    07/08/25 1057   BP: 142/82   BP Location: Left arm   Patient Position: Sitting   Cuff Size: Adult   Pulse: 68   SpO2: 98%   Weight: 59.6 kg (131 lb 6.4 oz)   Height: 152.4 cm (60\")   PainSc: 0-No pain       Estimated body mass index is 25.66 kg/m² as calculated from the following:    Height as of this encounter: 152.4 cm (60\").    Weight as of this encounter: 59.6 kg (131 lb 6.4 oz).                Does the patient have evidence of cognitive impairment? No  Lab Results   Component Value Date    CHLPL 225 (H) 07/01/2025    TRIG 237 (H) 07/01/2025    HDL 60 07/01/2025     (H) 07/01/2025    VLDL 41 (H) 07/01/2025    HGBA1C 6.1 (H) 07/01/2025                                                                                                Health  Risk Assessment    Smoking Status:  Social History     Tobacco Use   Smoking Status Former    Current packs/day: 0.00    Average packs/day: 0.3 packs/day for 50.0 years (12.5 ttl pk-yrs)    Types: Cigarettes    Start date: 1974    Quit date: 2024    Years " since quittin.5    Passive exposure: Past   Smokeless Tobacco Never   Tobacco Comments    Pt started smoking in 8 quit for 15 years and started back      Alcohol Consumption:  Social History     Substance and Sexual Activity   Alcohol Use Yes    Comment: 1 Pint per week, SATURDAY NIGHT LAST DRINK        Fall Risk Screen  ANOOPADI Fall Risk Assessment was completed, and patient is at MODERATE risk for falls. Assessment completed on:2025    Depression Screening   Little interest or pleasure in doing things? Not at all   Feeling down, depressed, or hopeless? Not at all   PHQ-2 Total Score 0      Health Habits and Functional and Cognitive Screenin/8/2025    10:59 AM   Functional & Cognitive Status   Do you have difficulty preparing food and eating? No   Do you have difficulty bathing yourself, getting dressed or grooming yourself? No   Do you have difficulty using the toilet? No   Do you have difficulty moving around from place to place? No   Do you have trouble with steps or getting out of a bed or a chair? No   Current Diet Well Balanced Diet   Dental Exam Up to date   Eye Exam Up to date   Exercise (times per week) 0 times per week   Current Exercises Include Walking;Gardening;Yard Work   Do you need help using the phone?  No   Are you deaf or do you have serious difficulty hearing?  No   Do you need help to go to places out of walking distance? Yes   Do you need help shopping? No   Do you need help preparing meals?  No   Do you need help with housework?  No   Do you need help with laundry? No   Do you need help taking your medications? No   Do you need help managing money? No   Do you ever drive or ride in a car without wearing a seat belt? No   Have you felt unusual fatigue (could be tiredness), stress, anger or loneliness in the last month? No   Who do you live with? Child   If you need help, do you have trouble finding someone available to you? No   Have you been bothered in the last four  weeks by sexual problems? No   Do you have difficulty concentrating, remembering or making decisions? Yes           Age-appropriate Screening Schedule:  Refer to the list below for future screening recommendations based on patient's age, sex and/or medical conditions. Orders for these recommended tests are listed in the plan section. The patient has been provided with a written plan.    Health Maintenance List  Health Maintenance   Topic Date Due    ZOSTER VACCINE (1 of 2) Never done    RSV Vaccine - Adults (1 - 1-dose 75+ series) Never done    COVID-19 Vaccine (4 - 2024-25 season) 09/01/2024    TDAP/TD VACCINES (2 - Td or Tdap) 02/10/2026 (Originally 5/3/2021)    INFLUENZA VACCINE  10/01/2025    LIPID PANEL  07/01/2026    ANNUAL WELLNESS VISIT  07/08/2026    DXA SCAN  08/29/2026    Pneumococcal Vaccine 50+  Completed    COLONOSCOPY  Discontinued                                                                                                                                                CMS Preventative Services Quick Reference  Risk Factors Identified During Encounter  None Identified    The above risks/problems have been discussed with the patient.  Pertinent information has been shared with the patient in the After Visit Summary.  An After Visit Summary and PPPS were made available to the patient.    Follow Up:   Next Medicare Wellness visit to be scheduled in 1 year.         Additional E&M Note during same encounter follows:  Patient has additional, significant, and separately identifiable condition(s)/problem(s) that require work above and beyond the Medicare Wellness Visit     Chief Complaint  Medicare Wellness-subsequent and Hypertension    Subjective   HPI  Pilar is also being seen today for an annual adult preventative physical exam.  and Rayma is also being seen today for additional medical problem/s.    Review of Systems   Constitutional: Negative.    HENT: Negative.     Eyes: Negative.    Respiratory:  "Negative.     Cardiovascular: Negative.    Gastrointestinal: Negative.    Neurological: Negative.               Objective   Vital Signs:  /82 (BP Location: Left arm, Patient Position: Sitting, Cuff Size: Adult)   Pulse 68   Ht 152.4 cm (60\")   Wt 59.6 kg (131 lb 6.4 oz)   SpO2 98%   BMI 25.66 kg/m²   Physical Exam  Vitals and nursing note reviewed.   Constitutional:       Appearance: Normal appearance. She is normal weight.   HENT:      Head: Normocephalic and atraumatic.      Right Ear: Tympanic membrane, ear canal and external ear normal.      Left Ear: Tympanic membrane, ear canal and external ear normal.      Nose: Nose normal.      Mouth/Throat:      Mouth: Mucous membranes are moist.      Pharynx: Oropharynx is clear.   Eyes:      Extraocular Movements: Extraocular movements intact.      Conjunctiva/sclera: Conjunctivae normal.      Pupils: Pupils are equal, round, and reactive to light.   Cardiovascular:      Rate and Rhythm: Normal rate and regular rhythm.      Pulses: Normal pulses.      Heart sounds: Normal heart sounds.   Pulmonary:      Effort: Pulmonary effort is normal.      Breath sounds: Normal breath sounds.   Abdominal:      General: Abdomen is flat. Bowel sounds are normal.      Palpations: Abdomen is soft.   Musculoskeletal:         General: Normal range of motion.      Cervical back: Normal range of motion and neck supple.   Feet:      Comments:      Skin:     General: Skin is warm and dry.   Neurological:      General: No focal deficit present.      Mental Status: She is alert and oriented to person, place, and time.   Psychiatric:         Mood and Affect: Mood normal.         Behavior: Behavior normal.         Thought Content: Thought content normal.         Judgment: Judgment normal.             CMP          12/31/2024    12:11 7/1/2025    10:58   CMP   Glucose 96  96    BUN 17  15    Creatinine 0.98  0.85    EGFR 58  69    Sodium 137  142    Potassium 4.9  4.5    Chloride 100  106 "    Calcium 9.6  9.3    Total Protein 6.7  6.5    Albumin 4.2  4.2    Globulin 2.5  2.3    Total Bilirubin 0.3  0.2    Alkaline Phosphatase 66  74    AST (SGOT) 21  17    ALT (SGPT) 9  8    BUN/Creatinine Ratio 17  18      CBC w/diff          12/31/2024    12:11 7/1/2025    10:58   CBC w/Diff   WBC 8.9  7.5    RBC 4.27  4.34    Hemoglobin 12.6  12.3    Hematocrit 38.8  39.0    MCV 91  90    MCH 29.5  28.3    MCHC 32.5  31.5    RDW 12.8  13.5    Platelets 323  350    Neutrophil Rel % 56  50    Lymphocyte Rel % 30  36    Monocyte Rel % 10  9    Eosinophil Rel % 2  4    Basophil Rel % 1  1      Lipid Panel          12/31/2024    12:11 7/1/2025    10:58   Lipid Panel   Total Cholesterol 237  225    Triglycerides 210  237    HDL Cholesterol 67  60    VLDL Cholesterol 37  41    LDL Cholesterol  133  124      TSH          12/31/2024    12:11 7/1/2025    10:58   TSH   TSH 2.590  1.470      Most Recent A1C          7/1/2025    10:58   HGBA1C Most Recent   Hemoglobin A1C 6.1           Assessment and Plan Additional age appropriate preventative wellness advice topics were discussed during today's preventative wellness exam(some topics already addressed during AWV portion of the note above):    Physical Activity: Advised cardiovascular activity 150 minutes per week as tolerated. (example brisk walk for 30 minutes, 5 days a week).     Nutrition: Discussed nutrition plan with patient. Information shared in after visit summary. Goal is for a well balanced diet to enhance overall health.     Healthy Weight: Discussed current and goal BMI with patient. Steps to attain this goal discussed. Information shared in after visit summary.     Medicare annual wellness visit, subsequent  We health maintenance, diet, exercise, and immunizations.         Physical exam  Discussed health maintenance, diet, exercise, and immunizations.       Essential hypertension, benign  Discussed with patient to monitor their blood pressure and if systolic blood  pressure goes above 140 or diastolic is above 90 to return to clinic.  Take medicines as directed, call for any problems, patient not having or any worrisome symptoms.             Coronary artery disease involving native coronary artery of native heart without angina pectoris  Aggressive risk factor modification.         Hyperlipidemia, mixed  HDL 60.  .  Triglycerides 237.         Hyperglycemia  A1c 6.1.  Recheck in 6 months.         Vitamin D deficiency  Vitamin D 28.9.  Will continue replacement.         Chronic kidney disease, stage 3a  Patient is instructed to not take any NSAIDs.  Medicines as directed.  Stay well-hydrated.           Abnormal CT lung screening  CT scheduled in August.       Pulmonary emphysema, unspecified emphysema type  Patient continues to smoke.  I gave her some Trelegy samples.  She cannot afford it otherwise.                 Follow Up   No follow-ups on file.  Patient was given instructions and counseling regarding her condition or for health maintenance advice. Please see specific information pulled into the AVS if appropriate.

## 2025-07-08 NOTE — ASSESSMENT & PLAN NOTE
Patient continues to smoke.  I gave her some Trelegy samples.  She cannot afford it otherwise.

## 2025-07-14 ENCOUNTER — TELEPHONE (OUTPATIENT)
Dept: FAMILY MEDICINE CLINIC | Facility: CLINIC | Age: 80
End: 2025-07-14
Payer: MEDICARE

## 2025-07-14 NOTE — TELEPHONE ENCOUNTER
Pt contacted and told dr mari was not here she stated she bp is now 145 over 62 I advised her too be seen by urgent care she said she will come tomorrow

## 2025-07-14 NOTE — TELEPHONE ENCOUNTER
Caller: Pilar Colon     Relationship:     Best call back number: [phone]    What is your medical concern?     HIGH BLOOD PRESSURE    BLOOD PRESSURE 170/90  PLEASE CALL PATIENT    PATIENT STATED SHE CALLED EARLIER TODAY

## 2025-07-16 DIAGNOSIS — R91.1 LUNG NODULE: Primary | ICD-10-CM

## 2025-08-05 ENCOUNTER — TELEPHONE (OUTPATIENT)
Dept: FAMILY MEDICINE CLINIC | Facility: CLINIC | Age: 80
End: 2025-08-05
Payer: MEDICARE

## 2025-08-14 ENCOUNTER — HOSPITAL ENCOUNTER (OUTPATIENT)
Facility: HOSPITAL | Age: 80
Discharge: HOME | End: 2025-08-14
Admitting: NURSE PRACTITIONER
Payer: MEDICARE

## 2025-08-14 DIAGNOSIS — R91.1 LUNG NODULE: ICD-10-CM

## 2025-08-14 PROCEDURE — 71250 CT THORAX DX C-: CPT

## 2025-08-19 ENCOUNTER — TELEPHONE (OUTPATIENT)
Dept: ADMINISTRATIVE | Facility: OTHER | Age: 80
End: 2025-08-19
Payer: MEDICARE

## 2025-08-21 ENCOUNTER — TELEPHONE (OUTPATIENT)
Dept: FAMILY MEDICINE CLINIC | Facility: CLINIC | Age: 80
End: 2025-08-21
Payer: MEDICARE

## 2025-08-23 ENCOUNTER — OFFICE VISIT (OUTPATIENT)
Dept: FAMILY MEDICINE CLINIC | Facility: CLINIC | Age: 80
End: 2025-08-23
Payer: MEDICARE

## 2025-08-23 VITALS
BODY MASS INDEX: 24.26 KG/M2 | SYSTOLIC BLOOD PRESSURE: 128 MMHG | OXYGEN SATURATION: 92 % | HEIGHT: 61 IN | WEIGHT: 128.5 LBS | HEART RATE: 67 BPM | DIASTOLIC BLOOD PRESSURE: 84 MMHG

## 2025-08-23 DIAGNOSIS — E55.9 VITAMIN D DEFICIENCY: ICD-10-CM

## 2025-08-23 DIAGNOSIS — R91.8 ABNORMAL CT LUNG SCREENING: Primary | ICD-10-CM

## 2025-08-23 DIAGNOSIS — J44.1 COPD WITH EXACERBATION: ICD-10-CM

## 2025-08-23 DIAGNOSIS — I10 ESSENTIAL HYPERTENSION, BENIGN: ICD-10-CM

## 2025-08-23 DIAGNOSIS — J43.9 PULMONARY EMPHYSEMA, UNSPECIFIED EMPHYSEMA TYPE: ICD-10-CM

## 2025-08-23 DIAGNOSIS — R73.9 HYPERGLYCEMIA: ICD-10-CM

## 2025-08-23 DIAGNOSIS — Z79.899 HIGH RISK MEDICATION USE: ICD-10-CM

## 2025-08-23 DIAGNOSIS — I25.10 CORONARY ARTERY DISEASE INVOLVING NATIVE CORONARY ARTERY OF NATIVE HEART WITHOUT ANGINA PECTORIS: ICD-10-CM

## 2025-08-23 DIAGNOSIS — Z87.891 PERSONAL HISTORY OF TOBACCO USE, PRESENTING HAZARDS TO HEALTH: ICD-10-CM

## 2025-08-23 LAB
POC AMPHETAMINES: NEGATIVE
POC BARBITURATES: NEGATIVE
POC BENZODIAZEPHINES: NEGATIVE
POC BUPRENORPHINE: NEGATIVE
POC COCAINE: NEGATIVE
POC METHADONE: NEGATIVE
POC METHAMPHETAMINE SCREEN URINE: NEGATIVE
POC OPIATES: NEGATIVE
POC OXYCODONE: NEGATIVE
POC PHENCYCLIDINE: NEGATIVE
POC PROPOXYPHENE: NEGATIVE
POC THC: NEGATIVE
POC TRICYCLIC ANTIDEPRESSANTS: NEGATIVE

## 2025-08-23 RX ORDER — LORAZEPAM 0.5 MG/1
0.5 TABLET ORAL EVERY 8 HOURS PRN
Qty: 30 TABLET | Refills: 2 | Status: SHIPPED | OUTPATIENT
Start: 2025-08-23

## 2025-08-29 DIAGNOSIS — C34.90 MALIGNANT NEOPLASM OF BRONCHUS AND LUNG: Primary | ICD-10-CM
